# Patient Record
Sex: FEMALE | Race: WHITE | NOT HISPANIC OR LATINO | Employment: FULL TIME | URBAN - METROPOLITAN AREA
[De-identification: names, ages, dates, MRNs, and addresses within clinical notes are randomized per-mention and may not be internally consistent; named-entity substitution may affect disease eponyms.]

---

## 2017-10-02 ENCOUNTER — OFFICE VISIT (OUTPATIENT)
Dept: FAMILY MEDICINE | Facility: CLINIC | Age: 32
End: 2017-10-02
Payer: COMMERCIAL

## 2017-10-02 VITALS
TEMPERATURE: 99.1 F | HEIGHT: 65 IN | DIASTOLIC BLOOD PRESSURE: 74 MMHG | OXYGEN SATURATION: 100 % | SYSTOLIC BLOOD PRESSURE: 122 MMHG | BODY MASS INDEX: 27.63 KG/M2 | WEIGHT: 165.8 LBS | HEART RATE: 61 BPM | RESPIRATION RATE: 18 BRPM

## 2017-10-02 DIAGNOSIS — Z23 NEED FOR PROPHYLACTIC VACCINATION AND INOCULATION AGAINST INFLUENZA: ICD-10-CM

## 2017-10-02 DIAGNOSIS — Z00.00 ROUTINE GENERAL MEDICAL EXAMINATION AT A HEALTH CARE FACILITY: Primary | ICD-10-CM

## 2017-10-02 PROCEDURE — 90686 IIV4 VACC NO PRSV 0.5 ML IM: CPT | Performed by: FAMILY MEDICINE

## 2017-10-02 PROCEDURE — 99395 PREV VISIT EST AGE 18-39: CPT | Mod: 25 | Performed by: FAMILY MEDICINE

## 2017-10-02 PROCEDURE — 90471 IMMUNIZATION ADMIN: CPT | Performed by: FAMILY MEDICINE

## 2017-10-02 NOTE — PROGRESS NOTES
SUBJECTIVE:   CC: Donna Dale is an 32 year old woman who presents for preventive health visit.     Physical   Annual:     Getting at least 3 servings of Calcium per day::  Yes    Bi-annual eye exam::  NO    Dental care twice a year::  Yes    Sleep apnea or symptoms of sleep apnea::  None    Diet::  Regular (no restrictions)    Taking medications regularly::  Yes    Medication side effects::  Not applicable    Additional concerns today::  No  [facility]    CV:  No family h/o early CAD.  Malignancy: pap NIL last year.  No family h/o breast, ovarian, or colon cancer.    Bone health:no family h/o osteoporosis, gets calcium, has started to get back into running.    Immunizations: tdap is up to date, due 2019; would like a flu shot  Sexual health: Not sexually active, regular periods  Depression screen: neg      Today's PHQ-2 Score:   PHQ-2 ( 1999 Pfizer) 10/2/2017   Q1: Little interest or pleasure in doing things 0   Q2: Feeling down, depressed or hopeless 0   PHQ-2 Score 0   Q1: Little interest or pleasure in doing things Not at all   Q2: Feeling down, depressed or hopeless Not at all   PHQ-2 Score 0     Abuse: Current or Past(Physical, Sexual or Emotional)- No  Do you feel safe in your environment - Yes    Social History   Substance Use Topics     Smoking status: Never Smoker     Smokeless tobacco: Never Used     Alcohol use Yes      Comment: 1-2 per week     The patient does not drink >3 drinks per day nor >7 drinks per week.    Reviewed orders with patient.  Reviewed health maintenance and updated orders accordingly - Yes  There is no problem list on file for this patient.    History reviewed. No pertinent surgical history.    Social History   Substance Use Topics     Smoking status: Never Smoker     Smokeless tobacco: Never Used     Alcohol use Yes      Comment: 1-2 per week     Family History   Problem Relation Age of Onset     Hypertension Mother      Seizure Disorder Father      due to an MVA  "    Emphysema Maternal Grandmother 60     smoker     Coronary Artery Disease Maternal Uncle      Coronary Artery Disease Maternal Uncle          Current Outpatient Prescriptions   Medication Sig Dispense Refill     valACYclovir (VALTREX) 1000 mg tablet Take 2 tablets (2,000 mg) by mouth 2 times daily (Patient not taking: Reported on 10/2/2017) 16 tablet 3     No Known Allergies      Mammogram not appropriate for this patient based on age.    Pertinent mammograms are reviewed under the imaging tab.  History of abnormal Pap smear: NO - age 21-29 PAP every 3 years recommended    Reviewed and updated as needed this visit by clinical staffTobacco  Allergies  Meds  Med Hx  Surg Hx  Fam Hx  Soc Hx        Reviewed and updated as needed this visit by Provider            ROS:  C: NEGATIVE for fever, chills, change in weight  I: NEGATIVE for worrisome rashes, moles or lesions  E: NEGATIVE for vision changes or irritation  ENT: NEGATIVE for ear, mouth and throat problems  R: NEGATIVE for significant cough or SOB  B: NEGATIVE for masses, tenderness or discharge  CV: NEGATIVE for chest pain, palpitations or peripheral edema  GI: NEGATIVE for nausea, abdominal pain, heartburn, or change in bowel habits  : NEGATIVE for unusual urinary or vaginal symptoms. Periods are regular.  M: NEGATIVE for significant arthralgias or myalgia  N: NEGATIVE for weakness, dizziness or paresthesias  P: NEGATIVE for changes in mood or affect     OBJECTIVE:   /74 (BP Location: Right arm, Patient Position: Sitting, Cuff Size: Adult Regular)  Pulse 61  Temp 99.1  F (37.3  C) (Tympanic)  Resp 18  Ht 5' 4.5\" (1.638 m)  Wt 165 lb 12.8 oz (75.2 kg)  SpO2 100%  BMI 28.02 kg/m2  EXAM:  GENERAL: healthy, alert and no distress  EYES: Eyes grossly normal to inspection, PERRL and conjunctivae and sclerae normal  HENT: ear canals and TM's normal, nose and mouth without ulcers or lesions  NECK: no adenopathy, no asymmetry, masses, or scars and " "thyroid normal to palpation  RESP: lungs clear to auscultation - no rales, rhonchi or wheezes  BREAST: normal without masses, tenderness or nipple discharge and no palpable axillary masses or adenopathy  CV: regular rate and rhythm, normal S1 S2, no S3 or S4, no murmur, click or rub, no peripheral edema and peripheral pulses strong  ABDOMEN: soft, nontender, no hepatosplenomegaly, no masses and bowel sounds normal  MS: no gross musculoskeletal defects noted, no edema  SKIN: no suspicious lesions or rashes  NEURO: Normal strength and tone, mentation intact and speech normal  PSYCH: mentation appears normal, affect normal/bright    ASSESSMENT/PLAN:   1. Routine general medical examination at a health care facility  CV: no risk factors, working on running.  Malignancy: pap up to date  Bone health: no risk factors  Immunizations: flu shot today        COUNSELING:  Reviewed preventive health counseling, as reflected in patient instructions         reports that she has never smoked. She has never used smokeless tobacco.    Estimated body mass index is 28.02 kg/(m^2) as calculated from the following:    Height as of this encounter: 5' 4.5\" (1.638 m).    Weight as of this encounter: 165 lb 12.8 oz (75.2 kg).       Counseling Resources:  ATP IV Guidelines  Pooled Cohorts Equation Calculator  Breast Cancer Risk Calculator  FRAX Risk Assessment  ICSI Preventive Guidelines  Dietary Guidelines for Americans, 2010  USDA's MyPlate  ASA Prophylaxis  Lung CA Screening    Isadora Salas MD  [facility]  Answers for HPI/ROS submitted by the patient on 10/2/2017   PHQ-2 Score: 0    "

## 2017-10-02 NOTE — NURSING NOTE
"Chief Complaint   Patient presents with     Physical       Initial /74 (BP Location: Right arm, Patient Position: Sitting, Cuff Size: Adult Regular)  Pulse 61  Temp 99.1  F (37.3  C) (Tympanic)  Resp 18  Ht 5' 4.5\" (1.638 m)  Wt 165 lb 12.8 oz (75.2 kg)  SpO2 100%  BMI 28.02 kg/m2 Estimated body mass index is 28.02 kg/(m^2) as calculated from the following:    Height as of this encounter: 5' 4.5\" (1.638 m).    Weight as of this encounter: 165 lb 12.8 oz (75.2 kg).  Medication Reconciliation: unable or not appropriate to perform       Jesus Vaughn MA      "

## 2017-10-02 NOTE — MR AVS SNAPSHOT
After Visit Summary   10/2/2017    Donna Dale    MRN: [id number]           Patient Information     Date Of Birth          1985        Visit Information        Provider Department      10/2/2017 1:20 PM Isadora Salas MD [facility]        Today's Diagnoses     Routine general medical examination at a health care facility    -  1    Need for prophylactic vaccination and inoculation against influenza          Care Instructions      Preventive Health Recommendations  Female Ages 26 - 39  Yearly exam:   See your health care provider every year in order to    Review health changes.     Discuss preventive care.      Review your medicines if you your doctor has prescribed any.    Until age 30: Get a Pap test every three years (more often if you have had an abnormal result).    After age 30: Talk to your doctor about whether you should have a Pap test every 3 years or have a Pap test with HPV screening every 5 years.   You do not need a Pap test if your uterus was removed (hysterectomy) and you have not had cancer.  You should be tested each year for STDs (sexually transmitted diseases), if you're at risk.   Talk to your provider about how often to have your cholesterol checked.  If you are at risk for diabetes, you should have a diabetes test (fasting glucose).  Shots: Get a flu shot each year. Get a tetanus shot every 10 years.   Nutrition:     Eat at least 5 servings of fruits and vegetables each day.    Eat whole-grain bread, whole-wheat pasta and brown rice instead of white grains and rice.    Talk to your provider about Calcium and Vitamin D.     Lifestyle    Exercise at least 150 minutes a week (30 minutes a day, 5 days of the week). This will help you control your weight and prevent disease.    Limit alcohol to one drink per day.    No smoking.     Wear sunscreen to prevent skin cancer.    See your dentist every six months for an exam and cleaning.             "Follow-ups after your visit        Who to contact     If you have questions or need follow up information about today's clinic visit or your schedule please contact [facility] directly at [phone].  Normal or non-critical lab and imaging results will be communicated to you by MyChart, letter or phone within 4 business days after the clinic has received the results. If you do not hear from us within 7 days, please contact the clinic through MyChart or phone. If you have a critical or abnormal lab result, we will notify you by phone as soon as possible.  Submit refill requests through [email or web address] or call your pharmacy and they will forward the refill request to us. Please allow 3 business days for your refill to be completed.          Additional Information About Your Visit        [email or web address] Information     [email or web address] lets you send messages to your doctor, view your test results, renew your prescriptions, schedule appointments and more. To sign up, go to [email or web address] . Click on \"Log in\" on the left side of the screen, which will take you to the Welcome page. Then click on \"Sign up Now\" on the right side of the page.     You will be asked to enter the access code listed below, as well as some personal information. Please follow the directions to create your username and password.     Your access code is: [email or web address]  Expires: 2017  5:08 PM     Your access code will  in 90 days. If you need help or a new code, please call your [facility] or [phone].        Care EveryWhere ID     This is your Care EveryWhere ID. This could be used by other organizations to access your [place] medical records  [id number]        Your Vitals Were     Pulse Temperature Respirations Height Last Period Pulse Oximetry    61 99.1  F (37.3  C) (Tympanic) 18 5' 4.5\" (1.638 m) 2017 (Exact Date) 100%    BMI (Body Mass Index)                   28.02 kg/m2            Blood Pressure from Last 3 " Encounters:   10/02/17 122/74   09/19/16 116/76   09/16/15 120/70    Weight from Last 3 Encounters:   10/02/17 165 lb 12.8 oz (75.2 kg)   09/19/16 156 lb 3.2 oz (70.9 kg)   09/16/15 164 lb 3.2 oz (74.5 kg)              We Performed the Following     FLU VAC, SPLIT VIRUS IM > 3 YO (QUADRIVALENT) [53968]     Vaccine Administration, Initial [92568]        Primary Care Provider    None Specified       No primary provider on file.        Equal Access to Services     [facility]: Hadii jb Branham, breann marquez, mian posadasalloren rodriguez, dean menon . [facility] [phone].    ATENCIÓN: Si habla español, tiene a valentin disposición servicios gratuitos de asistencia lingüística. [facility] [phone].    We comply with applicable federal civil rights laws and [place] laws. We do not discriminate on the basis of race, color, national origin, age, disability, sex, sexual orientation, or gender identity.            Thank you!     Thank you for choosing [facility]  for your care. Our goal is always to provide you with excellent care. Hearing back from our patients is one way we can continue to improve our services. Please take a few minutes to complete the written survey that you may receive in the mail after your visit with us. Thank you!             Your Updated Medication List - Protect others around you: Learn how to safely use, store and throw away your medicines at www.disposemymeds.org.          This list is accurate as of: 10/2/17  5:08 PM.  Always use your most recent med list.                   Brand Name Dispense Instructions for use Diagnosis    valACYclovir 1000 mg tablet    VALTREX    16 tablet    Take 2 tablets (2,000 mg) by mouth 2 times daily    Cold sore

## 2017-10-02 NOTE — PROGRESS NOTES
Injectable Influenza Immunization Documentation    1.  Is the person to be vaccinated sick today?   No    2. Does the person to be vaccinated have an allergy to a component   of the vaccine?   No    3. Has the person to be vaccinated ever had a serious reaction   to influenza vaccine in the past?   No    4. Has the person to be vaccinated ever had Guillain-Barré syndrome?   No    Form completed by patient.    Jesus Vaughn MA

## 2020-03-17 ENCOUNTER — VIRTUAL VISIT (OUTPATIENT)
Dept: FAMILY MEDICINE | Facility: OTHER | Age: 35
End: 2020-03-17

## 2020-03-17 ENCOUNTER — NURSE TRIAGE (OUTPATIENT)
Dept: NURSING | Facility: CLINIC | Age: 35
End: 2020-03-17

## 2020-03-17 NOTE — TELEPHONE ENCOUNTER
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 Patient was directed through [facility] to a testing location for COVID19.    Patient reporting she had not heard anything back on testing availability.  Patient denies change in symptoms.   Advised to continue to isolate at home per [facility] discharge instructions until she is notified to test.    Margareth Nunez RN  [facility]        Reason for Disposition    [1] Caller requesting NON-URGENT health information AND [2] PCP's office is the best resource    Additional Information    Negative: [1] Caller is not with the adult (patient) AND [2] reporting urgent symptoms    Negative: Lab result questions    Negative: Medication questions    Negative: Caller can't be reached by phone    Negative: Caller has already spoken to PCP or another triager    Negative: RN needs further essential information from caller in order to complete triage    Negative: Requesting regular office appointment    Protocols used: INFORMATION ONLY CALL-A-

## 2020-03-17 NOTE — PROGRESS NOTES
"Date: 2020 20:11:27  Clinician: Joel Wegener  Clinician NPI: [id number]  Patient: Donna Dale  Patient : 1985  Patient Address: [address], [place] [address]  Patient Phone: [phone]  Visit Protocol: URI  Patient Summary:  Donna is a 34 year old ( : 1985 ) female who initiated a Visit for cold, sinus infection, or influenza. When asked the question \"Please sign me up to receive news, health information and promotions from [employer].\", Donna responded \"No\".    Donna states her symptoms started 1-2 days ago.   Her symptoms consist of malaise, myalgia, a sore throat, and nasal congestion. Donna also feels feverish.   Symptom details     Nasal secretions: The color of her mucus is clear.    Sore throat: Donna reports having mild throat pain (1-3 on a 10 point pain scale), does not have exudate on her tonsils, and can swallow liquids. The lymph nodes in her neck are not enlarged. A rash has not appeared on the skin since the sore throat started.     Temperature: Her current temperature is 99.8 degrees Fahrenheit.      Donna denies having ear pain, headache, rhinitis, enlarged lymph nodes, facial pain or pressure, wheezing, cough, teeth pain, and chills. She also denies having recent facial or sinus surgery in the past 60 days and taking antibiotic medication for the symptoms. She is not experiencing dyspnea.   Precipitating events  Within the past week, Donna has not been exposed to someone with strep throat. She has not recently been exposed to someone with influenza. Donna has been in close contact with the following high risk individuals: adults 65 or older.   Pertinent COVID-19 (Coronavirus) information  Donna has not traveled internationally or to the areas where COVID-19 (Coronavirus) is widespread in the last 14 days before the start of her symptoms.   Donna has not had close contact with a suspected or laboratory-confirmed COVID-19 patient within 14 days of " symptom onset.   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   Pertinent medical history  Donna does not get yeast infections when she takes antibiotics.   Donna needs a return to work/school note.   Weight: 157 lbs   Donna does not smoke or use smokeless tobacco.   She denies pregnancy and denies breastfeeding. She has menstruated in the past month.   Weight: 157 lbs    MEDICATIONS: Women's Multivitamin oral, ALLERGIES: NKDA  Clinician Response:  Dear Donna,   Based on the information you have provided, it is recommended that you go to one of our designated Coronavirus (Covid-19) testing centers to get a test done from your car.  We are offering testing from your car in [facility], [place], [facility] and [place].   To schedule a visit at [facility], [facility] or [place], please call [phone] to find out when the next available testing window is. Testing will be done in 1 hour blocks so that you can wait at home until we are available to more quickly perform your testing from the car.   To complete testing in [place] ONLY, follow the instructions below:    Go as soon as possible during the hours noted to [facility]) [address], [place] [address]. Hours: M-F 7:30am-5pm    What to expect:   When you arrive please come park in the parking lot.   Be prepared to present photo ID   Call [phone] and let them know you have arrived.    They will ask for information to get you registered for a visit and will ask for the description of your car and where you are parked.    They will add you to the queue to get your test (you will stay in your car the entire time).   You will then be met by a provider who will perform a brief assessment in your car and collect samples to test for coronavirus and possibly influenza or RSV.    Isolate yourself while traveling.  Do  Not allow any visitors within 6 feet.  Do Not go to work or school.  Do Not go to [employer],  centers, shopping, or other public places.  Do Not shake hands.  Avoid close contact with others (hugging, kissing).Protect Others:     Cover Your Mouth and Nose with a mask, disposable tissue or wash cloth to avoid spreading germs to others.  Wash your hands and face frequently with soap and water   Fever Medicines:    For fever relief, take acetaminophen or ibuprofen.  Treat fevers above 101deg F (38.3deg C) to lower fevers and make you more comfortable.   Acetaminophen (e.g., Tylenol): Take 650 mg (two 325 mg pills) by mouth every 4-6 hours as needed of regular strength Tylenol or 1,000 mg (two 500 mg pills) every 8 hours as needed of Extra Strength Tylenol.   Ibuprofen (e.g., Motrin, Advil): Take 400 mg (two 200 mg pills) by mouth every 6 hours as needed.   Acetaminophen is thought to be safer than ibuprofen or naproxen for people over 65 years old. Acetaminophen is in many OTC and prescription medicines. It might be in more than one medicine that you are taking. You need to be careful and not take an overdose. Before taking any medicine, read all the instructions on the package.  Caution -NSAIDs (e.g., ibuprofen, naproxen): Do not take nonsteroidal anti-inflammatory drugs (NSAIDs) if you have stomach problems, kidney disease, heart failure, or other contraindications to using this type of medicine. Do not take NSAID medicines for over 7 days without consulting your PCP. Do not take NSAID medicines if you are pregnant. Do not take NSAID medicines if you are also taking blood thinners.    Call or submit a new visit if: Breathing difficulty develops or you become worse.  Thank you for limiting contact with others, wearing a simple mask to cover your cough, practice good hand hygiene habits and accessing our virtual services where possible to limit the spread of this virus.  For more information about COVID19 and  options for caring for yourself at home, please visit the CDC website at https://www.cdc.gov/coronavirus/2019-ncov/about/steps-when-sick.html  For more options for care at [facility], please visit our website at [email or web address]    Diagnosis: Cough  Diagnosis ICD: R05

## 2020-05-04 ENCOUNTER — RESULTS ONLY (OUTPATIENT)
Dept: LAB | Age: 35
End: 2020-05-04

## 2020-05-04 ENCOUNTER — APPOINTMENT (OUTPATIENT)
Dept: LAB | Facility: CLINIC | Age: 35
End: 2020-05-04
Payer: COMMERCIAL

## 2020-05-04 ENCOUNTER — MYC REFILL (OUTPATIENT)
Dept: FAMILY MEDICINE | Facility: CLINIC | Age: 35
End: 2020-05-04

## 2020-05-04 DIAGNOSIS — B00.1 COLD SORE: ICD-10-CM

## 2020-05-06 LAB
COVID-19 SPIKE RBD ABY TITER: NORMAL
COVID-19 SPIKE RBD ABY: NEGATIVE

## 2020-05-07 RX ORDER — VALACYCLOVIR HYDROCHLORIDE 1 G/1
2000 TABLET, FILM COATED ORAL 2 TIMES DAILY
Qty: 16 TABLET | Refills: 3 | OUTPATIENT
Start: 2020-05-07

## 2020-05-07 NOTE — TELEPHONE ENCOUNTER
Routing refill request to provider for review/approval because:  Labs not current:  Creatinine - no lab in chart

## 2020-05-07 NOTE — TELEPHONE ENCOUNTER
Looks like patient no showed her last 2 appointments and hasn't been seen since 2018.  Needs appointment for refills.  Dr. Juana Collier MD / [facility]

## 2020-11-12 ENCOUNTER — E-VISIT (OUTPATIENT)
Dept: URGENT CARE | Facility: URGENT CARE | Age: 35
End: 2020-11-12
Payer: COMMERCIAL

## 2020-11-12 DIAGNOSIS — Z20.822 SUSPECTED COVID-19 VIRUS INFECTION: Primary | ICD-10-CM

## 2020-11-12 PROCEDURE — 99207 PR NO BILLABLE SERVICE THIS VISIT: CPT | Performed by: FAMILY MEDICINE

## 2020-11-12 NOTE — PATIENT INSTRUCTIONS
"  Dear Donna Dale,    Your symptoms show that you may have coronavirus (COVID-19). This illness can cause fever, cough and trouble breathing. Many people get a mild case and get better on their own. Some people can get very sick.    Will I be tested for COVID-19?  We would like to test you for this virus. I have placed an order for this test and please call [phone] to schedule testing. [employer] [facility] employees please call [phone]. [employer] employees call [phone].     When it's time for your COVID test:  Stay at least 6 feet away from others. (If someone will drive you to your test, stay in the backseat, as far away from the  as you can.)  Cover your mouth and nose with a mask, tissue or washcloth.  Go straight to the testing site. Don't make any stops on the way there or back.    Starting now:     Do not go to work.   o If you receive a negative COVID-19 test result and were NOT exposed to someone with a known positive COVID-19 test, you can return to work once you're free of fever for 24 hours without fever-reducing medication and your symptoms are improving or resolved.  o If you receive a positive COVID-19 test result, you must be cleared by Employee Occupational Health and Safety to return to work.   o If you were exposed to someone who has tested positive for COVID-19, you can return to work 14 days after your last contact with the positive individual, provided you do not have symptoms at all during that time. In some cases, your manager may ask you to come back sooner than 14 days.     During this time, don't leave the house except for testing or medical care.  o Stay in your own room, even for meals. Use your own bathroom if you can.  o Stay away from others in your home. No hugging, kissing or shaking hands. No visitors.  o Don't go to work, school or anywhere else.    Clean \"high touch\" surfaces often (doorknobs, counters, handles, etc.). Use a household cleaning spray or " wipes. You'll find a full list of  on the EPA website: www.epa.gov/pesticide-registration/list-n-disinfectants-use-against-sars-cov-2.    Cover your mouth and nose with a mask, tissue or washcloth to avoid spreading germs.    Wash your hands and face often. Use soap and water.    People in these groups are at risk for severe illness due to COVID-19:  o People 65 years and older  o People who live in a nursing home or long-term care facility  o People with chronic disease (lung, heart, cancer, diabetes, kidney, liver, immunologic)  o People who have a weakened immune system, including those who:  - Are in cancer treatment  - Take medicine that weakens the immune system, such as corticosteroids  - Had a bone marrow or organ transplant  - Have an immune deficiency  - Have poorly controlled HIV or AIDS  - Are obese (body mass index of 40 or higher)  - Smoke regularly      Caregivers should wear gloves while washing dishes, handling laundry and cleaning bedrooms and bathrooms.    Use caution when washing and drying laundry: Don't shake dirty laundry, and use the warmest water setting that you can.    For more tips, go to www.cdc.gov/coronavirus/2019-ncov/downloads/10Things.pdf.    Sign up for [employer]. We know it's scary to hear that you might have COVID-19. We want to track your symptoms to make sure you're okay over the next 2 weeks. Please look for an email from [employer]--this is a free, online program that we'll use to keep in touch. To sign up, follow the link in the email you will receive. Learn more at [email or web address]    How can I take care of myself?    Get lots of rest. Drink extra fluids (unless a doctor has told you not to)    Take Tylenol (acetaminophen) for fever or pain. If you have liver or kidney problems, ask your family doctor if it's okay to take Tylenol.  Adults can take either:    650 mg (two 325 mg pills) every 4 to 6 hours, or     1,000 mg (two 500 mg pills) every  8 hours as needed.    Note: Don't take more than 3,000 mg in one day. Acetaminophen is found in many medicines (both prescribed and over-the-counter medicines). Read all labels to be sure you don't take too much.  For children, check the Tylenol bottle for the right dose. The dose is based on the child's age or weight.    If you have other health problems (like cancer, heart failure, an organ transplant or severe kidney disease): Call your specialty clinic if you don't feel better in the next 2 days.    Know when to call 911. Emergency warning signs include:  Trouble breathing or shortness of breath  Pain or pressure in the chest that doesn't go away  Feeling confused like you haven't felt before, or not being able to wake up  Bluish-colored lips or face    Where can I get more information?     [facility] - About COVID-19: [email or web address]  CDC - What to Do If You're Sick: www.cdc.gov/coronavirus/2019-ncov/about/steps-when-sick.html

## 2020-12-27 ENCOUNTER — HEALTH MAINTENANCE LETTER (OUTPATIENT)
Age: 35
End: 2020-12-27

## 2021-01-31 ENCOUNTER — VIRTUAL VISIT (OUTPATIENT)
Dept: FAMILY MEDICINE | Facility: OTHER | Age: 36
End: 2021-01-31

## 2021-01-31 ENCOUNTER — OFFICE VISIT (OUTPATIENT)
Dept: URGENT CARE | Facility: URGENT CARE | Age: 36
End: 2021-01-31
Payer: COMMERCIAL

## 2021-01-31 VITALS
HEIGHT: 65 IN | OXYGEN SATURATION: 100 % | SYSTOLIC BLOOD PRESSURE: 118 MMHG | HEART RATE: 84 BPM | BODY MASS INDEX: 26.66 KG/M2 | DIASTOLIC BLOOD PRESSURE: 70 MMHG | TEMPERATURE: 98.7 F | WEIGHT: 160 LBS

## 2021-01-31 DIAGNOSIS — R07.0 THROAT PAIN: Primary | ICD-10-CM

## 2021-01-31 LAB
DEPRECATED S PYO AG THROAT QL EIA: NEGATIVE
SPECIMEN SOURCE: NORMAL

## 2021-01-31 PROCEDURE — 87651 STREP A DNA AMP PROBE: CPT | Performed by: PREVENTIVE MEDICINE

## 2021-01-31 PROCEDURE — U0003 INFECTIOUS AGENT DETECTION BY NUCLEIC ACID (DNA OR RNA); SEVERE ACUTE RESPIRATORY SYNDROME CORONAVIRUS 2 (SARS-COV-2) (CORONAVIRUS DISEASE [COVID-19]), AMPLIFIED PROBE TECHNIQUE, MAKING USE OF HIGH THROUGHPUT TECHNOLOGIES AS DESCRIBED BY CMS-2020-01-R: HCPCS | Performed by: PREVENTIVE MEDICINE

## 2021-01-31 PROCEDURE — U0005 INFEC AGEN DETEC AMPLI PROBE: HCPCS | Performed by: PREVENTIVE MEDICINE

## 2021-01-31 PROCEDURE — 99213 OFFICE O/P EST LOW 20 MIN: CPT | Performed by: PREVENTIVE MEDICINE

## 2021-01-31 RX ORDER — IBUPROFEN 200 MG
200 TABLET ORAL EVERY 4 HOURS PRN
COMMUNITY
End: 2023-10-24

## 2021-01-31 RX ORDER — ACETAMINOPHEN 325 MG/1
325-650 TABLET ORAL EVERY 6 HOURS PRN
COMMUNITY
End: 2024-01-19

## 2021-01-31 ASSESSMENT — MIFFLIN-ST. JEOR: SCORE: 1431.64

## 2021-01-31 NOTE — PROGRESS NOTES
"Date: 2021 07:44:20  Clinician: Anat Correia  Clinician NPI: [id number]  Patient: Donna Dale  Patient : 1985  Patient Address: [address], [place] [address]  Patient Phone: [phone]  Visit Protocol: URI  Patient Summary:  Donna is a 35 year old ( : 1985 ) female who initiated a [facility] Visit for cold, sinus infection, or influenza. When asked the question \"Please sign me up to receive news, health information and promotions. \", Donna responded \"No\".    Donna states her symptoms started 1-2 days ago.   Her symptoms consist of a headache and a sore throat.   Symptom details     Sore throat: Donna reports having moderate throat pain (4-6 on a 10 point pain scale), does not have exudate on her tonsils, and can swallow liquids. The lymph nodes in her neck are not enlarged. A rash has not appeared on the skin since the sore throat started.     Headache: She states the headache is mild (1-3 on a 10 point pain scale).      Donna denies having ear pain, chills, vomiting, myalgias, rhinitis, malaise, fever, enlarged lymph nodes, cough, nasal congestion, nausea, teeth pain, ageusia, diarrhea, wheezing, facial pain or pressure, and anosmia. She also denies having recent facial or sinus surgery in the past 60 days and taking antibiotic medication in the past month. She is not experiencing dyspnea.   Precipitating events  Within the past week, Donna has not been exposed to someone with strep throat.   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 In the past 14 days, Donna has worked or volunteered at a hospital or a clinic. 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   In the past 14 days, she has not lived in a congregate living setting.   Donna has not had a close " contact with a laboratory-confirmed COVID-19 patient within 14 days of symptom onset.    Donna has been tested for COVID-19.      Date(s) of her COVID-19 test as reported by the patient (free text): 12/16/20       Result of COVID-19 test as reported by the patient (free text): Negative       Type of test as reported by the patient (free text): Alvin Thompson has received the Pfizer-BioNTech COVID-19 vaccine. She got both doses.      Date of the first dose as reported by the patient (free text): 12/22/20       Date of the second dose as reported by the patient (free text): 1/12/21        Pertinent medical history  She has not been told by her provider to avoid NSAIDs.   Donna does not get yeast infections when she takes antibiotics.   Donna does not have diabetes. She denies having immunosuppressive conditions (e.g., chemotherapy, HIV, organ transplant, long-term use of steroids or other immunosuppressive medications, splenectomy). She denies having congestive heart failure and severe COPD. She does not have asthma.   Donna does not need a return to work/school note.   Donna does not smoke or use smokeless tobacco.   She denies pregnancy and denies breastfeeding. She has menstruated in the past month.   Weight: 160 lbs    MEDICATIONS: Women's Multivitamin oral, ALLERGIES: NKDA  Clinician Response:  Dear Donna,  I am sorry you are not feeling well. Your health is our priority. Based on the information provided, a throat swab is needed to determine whether or not you have strep throat. Please be seen by a provider in a clinic or an urgent care in the next 1 -2 days for evaluation of strep throat.  You will not be charged for this [facility] Visit. Thank you for trusting us with your care.  For the latest updates on COVID-19 (Coronavirus), please visit the Centers for Disease Control and Prevention (CDC).   Diagnosis: Refer for additional evaluation - strep pharyngitis  Diagnosis ICD: R69

## 2021-01-31 NOTE — PATIENT INSTRUCTIONS
Pharyngitis  COVID pending  Isolate for 8 days    PLAN:   See orders in epic.   Symptomatic treat with gargles, lozenges, and OTC analgesic as needed. Follow-up with primary clinic if not improving.

## 2021-01-31 NOTE — PROGRESS NOTES
"SUBJECTIVE:  Donna Dale is a 35 year old female with a chief complaint of sore throat.  Onset of symptoms was 2 day(s) ago.    Course of illness: sudden onset.  Severity moderate  Current and Associated symptoms: headache  Treatment measures tried include None tried.  Predisposing factors include None.    No past medical history on file.  Current Outpatient Medications   Medication Sig Dispense Refill     acetaminophen (TYLENOL) 325 MG tablet Take 325-650 mg by mouth every 6 hours as needed for mild pain       ibuprofen (ADVIL/MOTRIN) 200 MG tablet Take 200 mg by mouth every 4 hours as needed for mild pain       valACYclovir (VALTREX) 1000 mg tablet Take 2 tablets (2,000 mg) by mouth 2 times daily (Patient not taking: Reported on 10/2/2017) 16 tablet 3     Social History     Tobacco Use     Smoking status: Never Smoker     Smokeless tobacco: Never Used   Substance Use Topics     Alcohol use: Yes     Comment: 1-2 per week       ROS:  Review of systems negative except as stated above.    OBJECTIVE:   /70   Pulse 84   Temp 98.7  F (37.1  C) (Oral)   Ht 1.651 m (5' 5\")   Wt 72.6 kg (160 lb)   SpO2 100%   BMI 26.63 kg/m    GENERAL APPEARANCE: healthy, alert and no distress  EYES: EOMI,  PERRL, conjunctiva clear  HENT: ear canals and TM's normal.  Nose normal.  Pharynx erythematous with some exudate noted.  NECK: supple, non-tender to palpation, no adenopathy noted  RESP: lungs clear to auscultation - no rales, rhonchi or wheezes  CV: regular rates and rhythm, normal S1 S2, no murmur noted  ABDOMEN:  soft, nontender, no HSM or masses and bowel sounds normal  SKIN: no suspicious lesions or rashes    Rapid Strep test is negative; await throat culture results.    ASSESSMENT:  Pharyngitis  COVID pending  Isolate for 8 days    PLAN:   See orders in epic.   Symptomatic treat with gargles, lozenges, and OTC analgesic as needed. Follow-up with primary clinic if not improving.      "

## 2021-02-01 LAB
LABORATORY COMMENT REPORT: NORMAL
SARS-COV-2 RNA RESP QL NAA+PROBE: NEGATIVE
SARS-COV-2 RNA RESP QL NAA+PROBE: NORMAL
SPECIMEN SOURCE: NORMAL
STREP GROUP A PCR: NOT DETECTED

## 2021-03-15 ENCOUNTER — OFFICE VISIT (OUTPATIENT)
Dept: FAMILY MEDICINE | Facility: CLINIC | Age: 36
End: 2021-03-15
Payer: COMMERCIAL

## 2021-03-15 VITALS
OXYGEN SATURATION: 98 % | TEMPERATURE: 98.6 F | DIASTOLIC BLOOD PRESSURE: 71 MMHG | HEIGHT: 65 IN | HEART RATE: 94 BPM | BODY MASS INDEX: 26.29 KG/M2 | SYSTOLIC BLOOD PRESSURE: 122 MMHG | WEIGHT: 157.8 LBS

## 2021-03-15 DIAGNOSIS — Z00.00 ROUTINE GENERAL MEDICAL EXAMINATION AT A HEALTH CARE FACILITY: Primary | ICD-10-CM

## 2021-03-15 PROCEDURE — 90715 TDAP VACCINE 7 YRS/> IM: CPT | Performed by: FAMILY MEDICINE

## 2021-03-15 PROCEDURE — 90471 IMMUNIZATION ADMIN: CPT | Performed by: FAMILY MEDICINE

## 2021-03-15 PROCEDURE — 99395 PREV VISIT EST AGE 18-39: CPT | Mod: 25 | Performed by: FAMILY MEDICINE

## 2021-03-15 PROCEDURE — 87389 HIV-1 AG W/HIV-1&-2 AB AG IA: CPT | Performed by: FAMILY MEDICINE

## 2021-03-15 PROCEDURE — 36415 COLL VENOUS BLD VENIPUNCTURE: CPT | Performed by: FAMILY MEDICINE

## 2021-03-15 PROCEDURE — 86803 HEPATITIS C AB TEST: CPT | Performed by: FAMILY MEDICINE

## 2021-03-15 ASSESSMENT — ENCOUNTER SYMPTOMS
WEAKNESS: 0
ARTHRALGIAS: 0
HEMATURIA: 0
SHORTNESS OF BREATH: 0
JOINT SWELLING: 0
FREQUENCY: 0
COUGH: 0
DYSURIA: 0
PALPITATIONS: 0
EYE PAIN: 0
SORE THROAT: 0
DIZZINESS: 0
FEVER: 0
MYALGIAS: 0
NERVOUS/ANXIOUS: 0
HEADACHES: 0
HEMATOCHEZIA: 0
HEARTBURN: 0
BREAST MASS: 0
NAUSEA: 0
ABDOMINAL PAIN: 0
PARESTHESIAS: 0
CONSTIPATION: 0
DIARRHEA: 0
CHILLS: 0

## 2021-03-15 ASSESSMENT — MIFFLIN-ST. JEOR: SCORE: 1426.42

## 2021-03-15 NOTE — PROGRESS NOTES
Answers for HPI/ROS submitted by the patient on 3/15/2021   Annual Exam:  Frequency of exercise:: 2-3 days/week  Getting at least 3 servings of Calcium per day:: Yes  Diet:: Regular (no restrictions)  Taking medications regularly:: Yes  Medication side effects:: None  Bi-annual eye exam:: NO  Dental care twice a year:: Yes  Sleep apnea or symptoms of sleep apnea:: None  abdominal pain: No  Blood in stool: No  Blood in urine: No  chest pain: No  chills: No  congestion: No  constipation: No  cough: No  diarrhea: No  dizziness: No  ear pain: No  eye pain: No  nervous/anxious: No  fever: No  frequency: No  genital sores: No  headaches: No  hearing loss: No  heartburn: No  arthralgias: No  joint swelling: No  peripheral edema: No  mood changes: No  myalgias: No  nausea: No  dysuria: No  palpitations: No  Skin sensation changes: No  sore throat: No  urgency: No  rash: No  shortness of breath: No  visual disturbance: No  weakness: No  pelvic pain: No  vaginal bleeding: No  vaginal discharge: No  tenderness: No  breast mass: No  breast discharge: No  Additional concerns today:: No  Duration of exercise:: 15-30 minutes     SUBJECTIVE:   CC: Donna Dale is an 35 year old woman who presents for preventive health visit.       Patient has been advised of split billing requirements and indicates understanding: Yes  Healthy Habits:    Do you get at least three servings of calcium containing foods daily (dairy, green leafy vegetables, etc.)? yes    Amount of exercise or daily activities, outside of work: 2-3 day(s) per week    Problems taking medications regularly No    Medication side effects: No    Have you had an eye exam in the past two years? no    Do you see a dentist twice per year? yes    Do you have sleep apnea, excessive snoring or daytime drowsiness?no       None.    Today's PHQ-2 Score:   PHQ-2 ( 1999 Pfizer) 3/15/2021 10/2/2017   Q1: Little interest or pleasure in doing things 0 0   Q2: Feeling down, depressed or  hopeless 0 0   PHQ-2 Score 0 0   Q1: Little interest or pleasure in doing things Not at all Not at all   Q2: Feeling down, depressed or hopeless Not at all Not at all   PHQ-2 Score 0 0       Abuse: Current or Past(Physical, Sexual or Emotional)- No  Do you feel safe in your environment? Yes         Social History     Tobacco Use     Smoking status: Never Smoker     Smokeless tobacco: Never Used   Substance Use Topics     Alcohol use: Yes     Comment: 1-2 per week     If you drink alcohol do you typically have >3 drinks per day or >7 drinks per week? No                     Reviewed orders with patient.  Reviewed health maintenance and updated orders accordingly - Yes       Breast CA Risk Screening:  Breast CA Risk Assessment (FHS-7) 3/15/2021   Do you have a family history of breast, colon, or ovarian cancer? No / Unknown           Pertinent mammograms are reviewed under the imaging tab.    Pertinent mammograms are reviewed under the imaging tab.  History of abnormal Pap smear:   Last 3 Pap and HPV Results:   PAP / HPV 9/19/2016   PAP NIL     PAP / HPV 9/19/2016   PAP NIL     Reviewed and updated as needed this visit by clinical staff  Tobacco  Allergies  Meds   Med Hx    Soc Hx        Reviewed and updated as needed this visit by Provider                History reviewed. No pertinent past medical history.   No past surgical history on file.  OB History   No obstetric history on file.       ROS:  CONSTITUTIONAL: NEGATIVE for fever, chills, change in weight  INTEGUMENTARU/SKIN: NEGATIVE for worrisome rashes, moles or lesions  EYES: NEGATIVE for vision changes or irritation  ENT: NEGATIVE for ear, mouth and throat problems  RESP: NEGATIVE for significant cough or SOB  BREAST: NEGATIVE for masses, tenderness or discharge  CV: NEGATIVE for chest pain, palpitations or peripheral edema  GI: NEGATIVE for nausea, abdominal pain, heartburn, or change in bowel habits  : NEGATIVE for unusual urinary or vaginal symptoms.  "Periods are regular.  MUSCULOSKELETAL: NEGATIVE for significant arthralgias or myalgia  NEURO: NEGATIVE for weakness, dizziness or paresthesias  PSYCHIATRIC: NEGATIVE for changes in mood or affect    OBJECTIVE:   /71 (BP Location: Left arm, Patient Position: Sitting, Cuff Size: Adult Regular)   Pulse 94   Temp 98.6  F (37  C) (Tympanic)   Ht 1.659 m (5' 5.3\")   Wt 71.6 kg (157 lb 12.8 oz)   SpO2 98%   BMI 26.02 kg/m    EXAM:  GENERAL: healthy, alert and no distress  EYES: Eyes grossly normal to inspection, PERRL and conjunctivae and sclerae normal  HENT: ear canals and TM's normal, nose and mouth without ulcers or lesions  NECK: no adenopathy, no asymmetry, masses, or scars and thyroid normal to palpation  RESP: lungs clear to auscultation - no rales, rhonchi or wheezes  BREAST: normal without masses, tenderness or nipple discharge and no palpable axillary masses or adenopathy  CV: regular rate and rhythm, normal S1 S2, no S3 or S4, no murmur, click or rub, no peripheral edema and peripheral pulses strong  ABDOMEN: soft, nontender, no hepatosplenomegaly, no masses and bowel sounds normal  MS: no gross musculoskeletal defects noted, no edema  SKIN: no suspicious lesions or rashes  NEURO: Normal strength and tone, mentation intact and speech normal  PSYCH: mentation appears normal, affect normal/bright    Diagnostic Test Results:  Labs reviewed in Epic    ASSESSMENT/PLAN:     1. Routine general medical examination at a health care facility  Healthy  No family history of cancer  Pap test due next fall  tdap is due today  I signed her form for her upcoming clinicals to indicate she does not need any accommodations or have any restrictions       COUNSELING:   Reviewed preventive health counseling, as reflected in patient instructions    Estimated body mass index is 26.02 kg/m  as calculated from the following:    Height as of this encounter: 1.659 m (5' 5.3\").    Weight as of this encounter: 71.6 kg (157 lb " 12.8 oz).         She reports that she has never smoked. She has never used smokeless tobacco.      Counseling Resources:  ATP IV Guidelines  Pooled Cohorts Equation Calculator  Breast Cancer Risk Calculator  BRCA-Related Cancer Risk Assessment: FHS-7 Tool  FRAX Risk Assessment  ICSI Preventive Guidelines  Dietary Guidelines for Americans, 2010  USDA's MyPlate  ASA Prophylaxis  Lung CA Screening    Isadora Olmedo MD  [facility]

## 2021-03-15 NOTE — NURSING NOTE
Prior to immunization administration, verified patients identity using patient s name and date of birth. Please see Immunization Activity for additional information.     Screening Questionnaire for Adult Immunization    Are you sick today?   No   Do you have allergies to medications, food, a vaccine component or latex?   No   Have you ever had a serious reaction after receiving a vaccination?   No   Do you have a long-term health problem with heart, lung, kidney, or metabolic disease (e.g., diabetes), asthma, a blood disorder, no spleen, complement component deficiency, a cochlear implant, or a spinal fluid leak?  Are you on long-term aspirin therapy?   No   Do you have cancer, leukemia, HIV/AIDS, or any other immune system problem?   No   Do you have a parent, brother, or sister with an immune system problem?   No   In the past 3 months, have you taken medications that affect  your immune system, such as prednisone, other steroids, or anticancer drugs; drugs for the treatment of rheumatoid arthritis, Crohn s disease, or psoriasis; or have you had radiation treatments?   No   Have you had a seizure, or a brain or other nervous system problem?   No   During the past year, have you received a transfusion of blood or blood    products, or been given immune (gamma) globulin or antiviral drug?   No   For women: Are you pregnant or is there a chance you could become       pregnant during the next month?   No   Have you received any vaccinations in the past 4 weeks?   No     Immunization questionnaire answers were all negative.        Per orders of . Dr. Olmedo injection of tdap given by Jenn Vaughn MA. Patient instructed to remain in clinic for 15 minutes afterwards, and to report any adverse reaction to me immediately.       Screening performed by Jenn Vaughn MA on 3/15/2021 at 4:06 PM.

## 2021-03-16 LAB
HCV AB SERPL QL IA: NONREACTIVE
HIV 1+2 AB+HIV1 P24 AG SERPL QL IA: NONREACTIVE

## 2021-03-16 NOTE — RESULT ENCOUNTER NOTE
Excellent! Please call or sent a [employer] message if you have any questions. Isadora Olmedo M.D.

## 2021-10-04 ENCOUNTER — HEALTH MAINTENANCE LETTER (OUTPATIENT)
Age: 36
End: 2021-10-04

## 2022-01-13 ENCOUNTER — LAB REQUISITION (OUTPATIENT)
Dept: LAB | Facility: CLINIC | Age: 37
End: 2022-01-13

## 2022-01-13 PROCEDURE — U0003 INFECTIOUS AGENT DETECTION BY NUCLEIC ACID (DNA OR RNA); SEVERE ACUTE RESPIRATORY SYNDROME CORONAVIRUS 2 (SARS-COV-2) (CORONAVIRUS DISEASE [COVID-19]), AMPLIFIED PROBE TECHNIQUE, MAKING USE OF HIGH THROUGHPUT TECHNOLOGIES AS DESCRIBED BY CMS-2020-01-R: HCPCS | Performed by: INTERNAL MEDICINE

## 2022-01-14 LAB — SARS-COV-2 RNA RESP QL NAA+PROBE: NEGATIVE

## 2022-05-15 ENCOUNTER — HEALTH MAINTENANCE LETTER (OUTPATIENT)
Age: 37
End: 2022-05-15

## 2022-08-08 ENCOUNTER — MYC MEDICAL ADVICE (OUTPATIENT)
Dept: FAMILY MEDICINE | Facility: CLINIC | Age: 37
End: 2022-08-08

## 2022-08-08 DIAGNOSIS — D22.9 MULTIPLE PIGMENTED NEVI: Primary | ICD-10-CM

## 2022-08-10 NOTE — TELEPHONE ENCOUNTER
Wants derm referral for skin check. Dad with hx skin CA  She has moles  Referral pended    April Licona, RN, BSN  [facility]

## 2022-08-11 NOTE — TELEPHONE ENCOUNTER
[employer] message sent to patient.  HOMER Chacon RN  [facility]

## 2022-09-05 ASSESSMENT — ENCOUNTER SYMPTOMS
DIARRHEA: 0
HEMATOCHEZIA: 0
CONSTIPATION: 0
PALPITATIONS: 0
NERVOUS/ANXIOUS: 0
MYALGIAS: 0
DYSURIA: 0
EYE PAIN: 0
SHORTNESS OF BREATH: 0
ABDOMINAL PAIN: 0
CHILLS: 0
HEMATURIA: 0
BREAST MASS: 0
JOINT SWELLING: 0
NAUSEA: 0
HEARTBURN: 0
HEADACHES: 0
SORE THROAT: 0
FEVER: 0
FREQUENCY: 0
PARESTHESIAS: 0
DIZZINESS: 0
ARTHRALGIAS: 0
COUGH: 0
WEAKNESS: 0

## 2022-09-11 ENCOUNTER — HEALTH MAINTENANCE LETTER (OUTPATIENT)
Age: 37
End: 2022-09-11

## 2022-09-12 ENCOUNTER — OFFICE VISIT (OUTPATIENT)
Dept: FAMILY MEDICINE | Facility: CLINIC | Age: 37
End: 2022-09-12
Payer: COMMERCIAL

## 2022-09-12 VITALS
WEIGHT: 171 LBS | TEMPERATURE: 98.4 F | HEIGHT: 65 IN | DIASTOLIC BLOOD PRESSURE: 86 MMHG | OXYGEN SATURATION: 97 % | HEART RATE: 66 BPM | RESPIRATION RATE: 18 BRPM | BODY MASS INDEX: 28.49 KG/M2 | SYSTOLIC BLOOD PRESSURE: 130 MMHG

## 2022-09-12 DIAGNOSIS — Z12.4 CERVICAL CANCER SCREENING: ICD-10-CM

## 2022-09-12 DIAGNOSIS — B00.1 COLD SORE: ICD-10-CM

## 2022-09-12 DIAGNOSIS — Z23 NEED FOR PROPHYLACTIC VACCINATION AND INOCULATION AGAINST INFLUENZA: ICD-10-CM

## 2022-09-12 DIAGNOSIS — Z00.00 ROUTINE GENERAL MEDICAL EXAMINATION AT A HEALTH CARE FACILITY: Primary | ICD-10-CM

## 2022-09-12 PROCEDURE — 87624 HPV HI-RISK TYP POOLED RSLT: CPT | Performed by: FAMILY MEDICINE

## 2022-09-12 PROCEDURE — G0145 SCR C/V CYTO,THINLAYER,RESCR: HCPCS | Performed by: FAMILY MEDICINE

## 2022-09-12 PROCEDURE — 90471 IMMUNIZATION ADMIN: CPT | Performed by: FAMILY MEDICINE

## 2022-09-12 PROCEDURE — 99395 PREV VISIT EST AGE 18-39: CPT | Mod: 25 | Performed by: FAMILY MEDICINE

## 2022-09-12 PROCEDURE — 90686 IIV4 VACC NO PRSV 0.5 ML IM: CPT | Performed by: FAMILY MEDICINE

## 2022-09-12 RX ORDER — VALACYCLOVIR HYDROCHLORIDE 1 G/1
2000 TABLET, FILM COATED ORAL 2 TIMES DAILY
Qty: 4 TABLET | Refills: 11 | Status: SHIPPED | OUTPATIENT
Start: 2022-09-12 | End: 2024-01-19

## 2022-09-12 ASSESSMENT — ENCOUNTER SYMPTOMS
COUGH: 0
HEMATOCHEZIA: 0
DIZZINESS: 0
DIARRHEA: 0
FEVER: 0
PALPITATIONS: 0
ABDOMINAL PAIN: 0
CHILLS: 0
PARESTHESIAS: 0
WEAKNESS: 0
ARTHRALGIAS: 0
HEADACHES: 0
EYE PAIN: 0
SHORTNESS OF BREATH: 0
NERVOUS/ANXIOUS: 0
CONSTIPATION: 0
MYALGIAS: 0
HEARTBURN: 0
BREAST MASS: 0
NAUSEA: 0
DYSURIA: 0
SORE THROAT: 0
HEMATURIA: 0
FREQUENCY: 0
JOINT SWELLING: 0

## 2022-09-12 NOTE — PROGRESS NOTES
SUBJECTIVE:   CC: Donna Dale is an 37 year old woman who presents for preventive health visit.     Patient has been advised of split billing requirements and indicates understanding: Yes    Healthy Habits:     Getting at least 3 servings of Calcium per day:  Yes    Bi-annual eye exam:  NO    Dental care twice a year:  Yes    Sleep apnea or symptoms of sleep apnea:  None    Diet:  Regular (no restrictions)    Frequency of exercise:  2-3 days/week    Duration of exercise:  15-30 minutes    Taking medications regularly:  Yes    Medication side effects:  Not applicable    PHQ-2 Total Score: 0    Additional concerns today:  No      Needs refill of valtrex for episodic treatment of cold sores. Doing well. She is recently engaged. Has been with her fiance for about a year and a half. Getting  next June. Has never been sexually active. Planning on waiting until next June. No concern for STI.     Today's PHQ-2 Score:   PHQ-2 ( 1999 Pfizer) 9/5/2022   Q1: Little interest or pleasure in doing things 0   Q2: Feeling down, depressed or hopeless 0   PHQ-2 Score 0   PHQ-2 Total Score (12-17 Years)- Positive if 3 or more points; Administer PHQ-A if positive -   Q1: Little interest or pleasure in doing things Not at all   Q2: Feeling down, depressed or hopeless Not at all   PHQ-2 Score 0       Abuse: Current or Past (Physical, Sexual or Emotional) - No  Do you feel safe in your environment? Yes    Have you ever done Advance Care Planning? (For example, a Health Directive, POLST, or a discussion with a medical provider or your loved ones about your wishes): No, advance care planning information given to patient to review.  Advanced care planning was discussed at today's visit.    Social History     Tobacco Use     Smoking status: Never Smoker     Smokeless tobacco: Never Used   Substance Use Topics     Alcohol use: Yes     Comment: 1-2 per week         Alcohol Use 9/5/2022   Prescreen: >3 drinks/day or >7  drinks/week? No   Prescreen: >3 drinks/day or >7 drinks/week? -       Reviewed orders with patient.  Reviewed health maintenance and updated orders accordingly - Yes       Breast Cancer Screening:    Breast CA Risk Assessment (FHS-7) 3/15/2021   Do you have a family history of breast, colon, or ovarian cancer? No / Unknown          History of abnormal Pap smear:   NO - age 30-65 PAP every 5 years with negative HPV co-testing recommended  Last 3 Pap and HPV Results:   PAP / HPV 9/19/2016   PAP (Historical) NIL     PAP / HPV 9/19/2016   PAP (Historical) NIL     Reviewed and updated as needed this visit by clinical staff   Tobacco  Allergies  Meds   Med Hx  Surg Hx  Fam Hx  Soc Hx          Reviewed and updated as needed this visit by Provider                   History reviewed. No pertinent past medical history.   History reviewed. No pertinent surgical history.  OB History   No obstetric history on file.       Review of Systems   Constitutional: Negative for chills and fever.   HENT: Negative for congestion, ear pain, hearing loss and sore throat.    Eyes: Negative for pain and visual disturbance.   Respiratory: Negative for cough and shortness of breath.    Cardiovascular: Negative for chest pain, palpitations and peripheral edema.   Gastrointestinal: Negative for abdominal pain, constipation, diarrhea, heartburn, hematochezia and nausea.   Breasts:  Negative for tenderness, breast mass and discharge.   Genitourinary: Negative for dysuria, frequency, genital sores, hematuria, pelvic pain, urgency, vaginal bleeding and vaginal discharge.   Musculoskeletal: Negative for arthralgias, joint swelling and myalgias.   Skin: Negative for rash.   Neurological: Negative for dizziness, weakness, headaches and paresthesias.   Psychiatric/Behavioral: Negative for mood changes. The patient is not nervous/anxious.            OBJECTIVE:   /86   Pulse 66   Temp 98.4  F (36.9  C) (Temporal)   Resp 18   Ht 1.645 m (5'  "4.75\")   Wt 77.6 kg (171 lb)   LMP 09/04/2022 (Exact Date)   SpO2 97%   BMI 28.68 kg/m     Wt Readings from Last 5 Encounters:   09/12/22 77.6 kg (171 lb)   03/15/21 71.6 kg (157 lb 12.8 oz)   01/31/21 72.6 kg (160 lb)   10/02/17 75.2 kg (165 lb 12.8 oz)   09/19/16 70.9 kg (156 lb 3.2 oz)      Physical Exam  GENERAL: healthy, alert and no distress  EYES: Eyes grossly normal to inspection, PERRL and conjunctivae and sclerae normal  HENT: ear canals and TM's normal, nose and mouth without ulcers or lesions  NECK: no adenopathy, no asymmetry, masses, or scars and thyroid normal to palpation  RESP: lungs clear to auscultation - no rales, rhonchi or wheezes  CV: regular rate and rhythm, normal S1 S2, no S3 or S4, no murmur   ABDOMEN: soft, nontender, no hepatosplenomegaly, no masses and bowel sounds normal   (female): normal female external genitalia, normal urethral meatus, vaginal mucosa pink, moist, well rugated, and normal cervix, difficult exam for Donna today due to her discomfort.   MS: no gross musculoskeletal defects noted, no edema  SKIN: no suspicious lesions or rashes  NEURO: Normal strength and tone, mentation intact and speech normal  PSYCH: mentation appears normal, affect normal/bright    Diagnostic Test Results:  Labs reviewed in Epic    ASSESSMENT/PLAN:       ICD-10-CM    1. Routine general medical examination at a health care facility  Z00.00    2. Cold sore  B00.1 valACYclovir (VALTREX) 1000 mg tablet   3. Cervical cancer screening  Z12.4 Pap Screen with HPV - recommended age 30 - 65 years   4. Need for prophylactic vaccination and inoculation against influenza  Z23              COUNSELING:  Reviewed preventive health counseling, as reflected in patient instructions    Estimated body mass index is 28.68 kg/m  as calculated from the following:    Height as of this encounter: 1.645 m (5' 4.75\").    Weight as of this encounter: 77.6 kg (171 lb).    Weight management plan: diet and exercise    She " reports that she has never smoked. She has never used smokeless tobacco.      Counseling Resources:  ATP IV Guidelines  Pooled Cohorts Equation Calculator  Breast Cancer Risk Calculator  BRCA-Related Cancer Risk Assessment: FHS-7 Tool  FRAX Risk Assessment  ICSI Preventive Guidelines  Dietary Guidelines for Americans, 2010  USDA's MyPlate  ASA Prophylaxis  Lung CA Screening    Isadora Olmedo MD   [facility]

## 2022-09-14 LAB
BKR LAB AP GYN ADEQUACY: NORMAL
BKR LAB AP GYN INTERPRETATION: NORMAL
BKR LAB AP HPV REFLEX: NORMAL
BKR LAB AP PREVIOUS ABNORMAL: NORMAL
PATH REPORT.COMMENTS IMP SPEC: NORMAL
PATH REPORT.COMMENTS IMP SPEC: NORMAL
PATH REPORT.RELEVANT HX SPEC: NORMAL

## 2022-09-16 LAB
HUMAN PAPILLOMA VIRUS 16 DNA: NEGATIVE
HUMAN PAPILLOMA VIRUS 18 DNA: NEGATIVE
HUMAN PAPILLOMA VIRUS FINAL DIAGNOSIS: NORMAL
HUMAN PAPILLOMA VIRUS OTHER HR: NEGATIVE

## 2023-01-11 ENCOUNTER — OFFICE VISIT (OUTPATIENT)
Dept: DERMATOLOGY | Facility: CLINIC | Age: 38
End: 2023-01-11
Attending: FAMILY MEDICINE
Payer: COMMERCIAL

## 2023-01-11 DIAGNOSIS — L57.8 SUN-DAMAGED SKIN: ICD-10-CM

## 2023-01-11 DIAGNOSIS — D22.9 MULTIPLE PIGMENTED NEVI: ICD-10-CM

## 2023-01-11 DIAGNOSIS — D22.9 MULTIPLE BENIGN NEVI: ICD-10-CM

## 2023-01-11 DIAGNOSIS — D18.01 CHERRY ANGIOMA: ICD-10-CM

## 2023-01-11 DIAGNOSIS — Z12.83 SKIN EXAM, SCREENING FOR CANCER: Primary | ICD-10-CM

## 2023-01-11 DIAGNOSIS — L81.4 SOLAR LENTIGO: ICD-10-CM

## 2023-01-11 PROCEDURE — 99203 OFFICE O/P NEW LOW 30 MIN: CPT | Performed by: PHYSICIAN ASSISTANT

## 2023-01-11 ASSESSMENT — PAIN SCALES - GENERAL: PAINLEVEL: NO PAIN (0)

## 2023-01-11 NOTE — NURSING NOTE
Dermatology Rooming Note    Donna Dale's goals for this visit include:   Chief Complaint   Patient presents with     Skin Check     FBSE.  No spots of concern.      Jackie Hinton, CMA

## 2023-01-11 NOTE — PATIENT INSTRUCTIONS
Patient Education     Checking for Skin Cancer  You can find cancer early by checking your skin each month. There are 3 kinds of skin cancer. They are melanoma, basal cell carcinoma, and squamous cell carcinoma. Doing monthly skin checks is the best way to find new marks or skin changes. Follow the instructions below for checking your skin.   The ABCDEs of checking moles for melanoma   Check your moles or growths for signs of melanoma using ABCDE:   Asymmetry: the sides of the mole or growth don t match  Border: the edges are ragged, notched, or blurred  Color: the color within the mole or growth varies  Diameter: the mole or growth is larger than 6 mm (size of a pencil eraser)  Evolving: the size, shape, or color of the mole or growth is changing (evolving is not shown in the images below)    Checking for other types of skin cancer  Basal cell carcinoma or squamous cell carcinoma have symptoms such as:     A spot or mole that looks different from all other marks on your skin  Changes in how an area feels, such as itching, tenderness, or pain  Changes in the skin's surface, such as oozing, bleeding, or scaliness  A sore that does not heal  New swelling or redness beyond the border of a mole    Who s at risk?  Anyone can get skin cancer. But you are at greater risk if you have:   Fair skin, light-colored hair, or light-colored eyes  Many moles or abnormal moles on your skin  A history of sunburns from sunlight or tanning beds  A family history of skin cancer  A history of exposure to radiation or chemicals  A weakened immune system  If you have had skin cancer in the past, you are at risk for recurring skin cancer.   How to check your skin  Do your monthly skin checkups in front of a full-length mirror. Check all parts of your body, including your:   Head (ears, face, neck, and scalp)  Torso (front, back, and sides)  Arms (tops, undersides, upper, and lower armpits)  Hands (palms, backs, and fingers, including  under the nails)  Buttocks and genitals  Legs (front, back, and sides)  Feet (tops, soles, toes, including under the nails, and between toes)  If you have a lot of moles, take digital photos of them each month. Make sure to take photos both up close and from a distance. These can help you see if any moles change over time.   Most skin changes are not cancer. But if you see any changes in your skin, call your doctor right away. Only he or she can diagnose a problem. If you have skin cancer, seeing your doctor can be the first step toward getting the treatment that could save your life.   [employer] last reviewed this educational content on 4/1/2019 2000-2020 [employer]. [address] [place] [address], [place] [address]. All rights reserved. This information is not intended as a substitute for professional medical care. Always follow your healthcare professional's instructions.       When should I call my doctor?  If you are worsening or not improving, please, contact us or seek urgent care as noted below.     Who should I call with questions (adults)?  [facility] (adult and pediatric): [phone]  [facility] (adult): [phone]  For urgent needs outside of business hours call the [facility] at [phone] and ask for the dermatology resident on call to be paged  If this is a medical emergency and you are unable to reach an ER, Call [phone]    Who should I call with questions (pediatric)?  [facility]- Pediatric Dermatology  Dr. Mila Truong, Dr. Azalia Lr, Dr. Amelia Vivas, BENNIE Lockett, Dr. Aurora Duron, Dr. Yamini Phoenix & Dr. Daniel Weir  Non-urgent nurse triage line; [phone]- [facility] Coordinatortitus Henderson (/Complex ) [phone]    If you need a prescription refill, please contact your pharmacy. Refills are approved or denied by our  Physicians during normal business hours, Monday through Fridays  Per office policy, refills will not be granted if you have not been seen within the past year (or sooner depending on your child's condition)    Scheduling Information:  Pediatric Appointment Scheduling and Call Center [phone]  Radiology Scheduling- [phone]  Sedation Unit Scheduling- [phone]  [facility] Scheduling- General [phone]; Pediatric Dermatology [phone]  Main  Services: [phone]  Turkish: [phone]  Albanian: [phone]  Hmong/Citizen of [place]/Vietnamese: [phone]  Preadmission Nursing Department Fax Number: [phone] (Fax all pre-operative paperwork to this number)    For urgent matters arising during evenings, weekends, or holidays that cannot wait for normal business hours please call [phone] and ask for the dermatology resident on call to be paged.

## 2023-01-11 NOTE — LETTER
1/11/2023       RE: Donna Dale  [address]  [place] [address]     Dear Colleague,    Thank you for referring your patient, Donna Dale, to the [facility] [place] at [facility]. Please see a copy of my visit note below.    [facility] Dermatology Note  Encounter Date: Jan 11, 2023  Office Visit     Dermatology Problem List:  1. None    Social: Father with a history of AKs, history of mild burns. Wears sunscreen.  ____________________________________________    Assessment & Plan:    # Cherry Angioma(s) and Solar Lentigines  Discussed the natural history and benign nature of this lesion. Reassurance provided that no additional treatment is necessary.     # Multiple benign nevi.   - No concerning lesions today  - Monitor for ABCDEs of melanoma   - Continue sun protection - recommend SPF 30 or higher with frequent application   - Return sooner if noticing changing or symptomatic lesions    Procedures Performed:   None    Follow-up: 2 year(s) in-person, or earlier for new or changing lesions    Staff and Scribe:     Scribe Disclosure:  IRose Marie, am serving as a scribe to document services personally performed by Ines Kwong PA-C at this visit, based upon the provider's statements to me. All documentation has been reviewed by the aforementioned provider prior to being entered into the official medical record.     Rose Marie VALDEZ, a scribe, prepared the chart for today's encounter.     Provider Disclosure:   The documentation recorded by the scribe accurately reflects the services I personally performed and the decisions made by me.    All risks, benefits and alternatives were discussed with patient.  Patient is in agreement and understands the assessment and plan.  All questions were answered.  Sun Screen Education was given.   Return to Clinic 1-2 yrs or sooner as needed.   Ines  Varghese CRUZ   [facility]   ____________________________________________    CC: Skin Check (FBSE.  No spots of concern. )    HPI:  Ms. Donna Dale is a(n) 37 year old female who presents today as a new patient for a full body skin examination.     Of note, she was referred to Dermatology on 8/11/2022 by Dr. Olmedo for evaluation of multiple pigmented nevi.     Today, she notes that her father has had several spots removed that were precancerous. She denies a family history of melanoma. When prompted, she states that she has had several mild sunburns. She wears Neutrogena facial sunscreen. Patient is otherwise feeling well, without additional skin concerns.    Labs Reviewed:  N/A    Physical Exam:  Vitals: There were no vitals taken for this visit.  SKIN: Total skin including the undergarment areas was performed. The exam included the head/face, neck, both arms, chest, back, abdomen, both legs,and digits. Nails were not examined due to the presence of polish. Advised the patient to look for any dark marks/streaks when nail polish is not present.   - There are dome shaped bright red papules on the scalp, abdomen, and back.   - Multiple regular brown pigmented macules and papules are identified on the trunk and extremities.   - Scattered brown macules on sun exposed areas.  - No other lesions of concern on areas examined.     Medications:  Current Outpatient Medications   Medication     acetaminophen (TYLENOL) 325 MG tablet     ibuprofen (ADVIL/MOTRIN) 200 MG tablet     valACYclovir (VALTREX) 1000 mg tablet     No current facility-administered medications for this visit.      Past Medical History:   There is no problem list on file for this patient.    History reviewed. No pertinent past medical history.     CC Isadora Olmedo MD  [address] [place] [address] on close of this encounter.

## 2023-01-11 NOTE — PROGRESS NOTES
[facility] Dermatology Note  Encounter Date: Jan 11, 2023  Office Visit     Dermatology Problem List:  1. None    Social: Father with a history of AKs, history of mild burns. Wears sunscreen.  ____________________________________________    Assessment & Plan:    # Cherry Angioma(s) and Solar Lentigines  Discussed the natural history and benign nature of this lesion. Reassurance provided that no additional treatment is necessary.     # Multiple benign nevi.   - No concerning lesions today  - Monitor for ABCDEs of melanoma   - Continue sun protection - recommend SPF 30 or higher with frequent application   - Return sooner if noticing changing or symptomatic lesions    Procedures Performed:   None    Follow-up: 2 year(s) in-person, or earlier for new or changing lesions    Staff and Scribe:     Scribe Disclosure:  Rose Marie VALDEZ, am serving as a scribe to document services personally performed by Ines Kwong PA-C at this visit, based upon the provider's statements to me. All documentation has been reviewed by the aforementioned provider prior to being entered into the official medical record.     Rose Marie VALDEZ, a scribe, prepared the chart for today's encounter.     Provider Disclosure:   The documentation recorded by the scribe accurately reflects the services I personally performed and the decisions made by me.    All risks, benefits and alternatives were discussed with patient.  Patient is in agreement and understands the assessment and plan.  All questions were answered.  Sun Screen Education was given.   Return to Clinic 1-2 yrs or sooner as needed.   Ines Kwong PA-C   [facility]   ____________________________________________    CC: Skin Check (FBSE.  No spots of concern. )    HPI:  Ms. Donna Dale is a(n) 37 year old female who presents today as a new patient for a full body skin examination.     Of note, she was referred to Dermatology  on 8/11/2022 by Dr. Olmedo for evaluation of multiple pigmented nevi.     Today, she notes that her father has had several spots removed that were precancerous. She denies a family history of melanoma. When prompted, she states that she has had several mild sunburns. She wears Neutrogena facial sunscreen. Patient is otherwise feeling well, without additional skin concerns.    Labs Reviewed:  N/A    Physical Exam:  Vitals: There were no vitals taken for this visit.  SKIN: Total skin including the undergarment areas was performed. The exam included the head/face, neck, both arms, chest, back, abdomen, both legs,and digits. Nails were not examined due to the presence of polish. Advised the patient to look for any dark marks/streaks when nail polish is not present.   - There are dome shaped bright red papules on the scalp, abdomen, and back.   - Multiple regular brown pigmented macules and papules are identified on the trunk and extremities.   - Scattered brown macules on sun exposed areas.  - No other lesions of concern on areas examined.     Medications:  Current Outpatient Medications   Medication     acetaminophen (TYLENOL) 325 MG tablet     ibuprofen (ADVIL/MOTRIN) 200 MG tablet     valACYclovir (VALTREX) 1000 mg tablet     No current facility-administered medications for this visit.      Past Medical History:   There is no problem list on file for this patient.    History reviewed. No pertinent past medical history.     CC Isadora Olmedo MD  [address] [place] [address] on close of this encounter.

## 2023-08-14 ENCOUNTER — PATIENT OUTREACH (OUTPATIENT)
Dept: CARE COORDINATION | Facility: CLINIC | Age: 38
End: 2023-08-14
Payer: COMMERCIAL

## 2023-10-24 ENCOUNTER — TELEPHONE (OUTPATIENT)
Dept: OBGYN | Facility: CLINIC | Age: 38
End: 2023-10-24

## 2023-10-24 ENCOUNTER — VIRTUAL VISIT (OUTPATIENT)
Dept: OBGYN | Facility: CLINIC | Age: 38
End: 2023-10-24
Payer: COMMERCIAL

## 2023-10-24 VITALS — HEIGHT: 65 IN | BODY MASS INDEX: 28.68 KG/M2

## 2023-10-24 DIAGNOSIS — Z23 NEED FOR TDAP VACCINATION: ICD-10-CM

## 2023-10-24 DIAGNOSIS — O09.519 ENCOUNTER FOR SUPERVISION OF PRIMIGRAVIDA OF ADVANCED MATERNAL AGE: Primary | ICD-10-CM

## 2023-10-24 PROCEDURE — 99207 PR NO CHARGE NURSE ONLY: CPT

## 2023-10-24 RX ORDER — VITAMIN A ACETATE, .BETA.-CAROTENE, ASCORBIC ACID, CHOLECALCIFEROL, .ALPHA.-TOCOPHEROL ACETATE, DL-, THIAMINE MONONITRATE, RIBOFLAVIN, NIACINAMIDE, PYRIDOXINE HYDROCHLORIDE, FOLIC ACID, CYANOCOBALAMIN, CALCIUM CARBONATE, FERROUS FUMARATE, ZINC OXIDE, AND CUPRIC OXIDE 2000; 2000; 120; 400; 22; 1.84; 3; 20; 10; 1; 12; 200; 27; 25; 2 [IU]/1; [IU]/1; MG/1; [IU]/1; MG/1; MG/1; MG/1; MG/1; MG/1; MG/1; UG/1; MG/1; MG/1; MG/1; MG/1
TABLET ORAL
COMMUNITY
Start: 2023-05-01

## 2023-10-24 NOTE — TELEPHONE ENCOUNTER
[facility] Call Center    Phone Message    May a detailed message be left on voicemail: yes     Reason for Call: Other: Just an FYI, pt is scheduled for same-day appt today at 12:45 for a nurse ob intake phone call appt.      Action Taken: Other: [facility]    Travel Screening: Not Applicable

## 2023-10-24 NOTE — PROGRESS NOTES
Important Information for Provider:     New ob nurse intake by phone, first pregnancy, AMA. Handouts reviewed. Discussed genetic screening. Denies any problems at this time. Ultrasound and NOB with CNM 11/28/2023      Caffeine intake/servings daily - 1  Calcium intake/servings daily - 3  Exercise 5 times weekly - describe ; walks, precautions given  Sunscreen used - Yes  Seatbelts used - Yes  Guns stored in the home - No  Self Breast Exam - Yes  Pap test up to date -  Yes  Dental exam up to date -  Yes  Immunizations reviewed and up to date - Yes  Abuse: Current or Past (Physical, Sexual or Emotional) - No  Do you feel safe in your environment - Yes  Do you cope well with stress - Yes      Prenatal OB Questionnaire  Patient supplied answers from flow sheet for:  Prenatal OB Questionnaire.  Past Medical History  Have you ever recieved care for your mental health? : No  Have you ever been in a major accident or suffered serious trauma?: No  Within the last year, has anyone hit, slapped, kicked or otherwise hurt you?: No  In the last year, has anyone forced you to have sex when you didn't want to?: No    Past Medical History 2   Have you ever received a blood transfusion?: No  Would you accept a blood transfusion if was medically recommended?: Yes  Does anyone in your home smoke?: No   Is your blood type Rh negative?: No  Have you ever ?: No  Have you been hospitalized for a nonsurgical reason excluding normal delivery?: No  Have you ever had an abnormal pap smear?: No    Past Medical History (Continued)  Do you have a history of abnormalities of the uterus?: No  Did your mother take AUGUSTO or any other hormones when she was pregnant with you?: No  Do you have any other problems we have not asked about which you feel may be important to this pregnancy?: No                     Allergies as of 10/24/2023:    Allergies as of 10/24/2023    (No Known Allergies)               Early ultrasound screening tool:    Does  patient have irregular periods?  No  Did patient use hormonal birth control in the three months prior to positive urine pregnancy test? No  Is the patient breastfeeding?  No  Is the patient 10 weeks or greater at time of education visit?  No

## 2023-11-15 ENCOUNTER — IMMUNIZATION (OUTPATIENT)
Dept: FAMILY MEDICINE | Facility: CLINIC | Age: 38
End: 2023-11-15
Payer: COMMERCIAL

## 2023-11-15 DIAGNOSIS — Z23 ENCOUNTER FOR IMMUNIZATION: Primary | ICD-10-CM

## 2023-11-15 PROCEDURE — 99207 PR NO CHARGE NURSE ONLY: CPT

## 2023-11-15 PROCEDURE — 90480 ADMN SARSCOV2 VAC 1/ONLY CMP: CPT

## 2023-11-15 PROCEDURE — 91320 SARSCV2 VAC 30MCG TRS-SUC IM: CPT

## 2023-11-15 NOTE — PROGRESS NOTES
Prior to immunization administration, verified patients identity using patient s name and date of birth. Please see Immunization Activity for additional information.     Screening Questionnaire for Adult Immunization    Are you sick today?   No   Do you have allergies to medications, food, a vaccine component or latex?   No   Have you ever had a serious reaction after receiving a vaccination?   No   Do you have a long-term health problem with heart, lung, kidney, or metabolic disease (e.g., diabetes), asthma, a blood disorder, no spleen, complement component deficiency, a cochlear implant, or a spinal fluid leak?  Are you on long-term aspirin therapy?   No   Do you have cancer, leukemia, HIV/AIDS, or any other immune system problem?   No   Do you have a parent, brother, or sister with an immune system problem?   No   In the past 3 months, have you taken medications that affect  your immune system, such as prednisone, other steroids, or anticancer drugs; drugs for the treatment of rheumatoid arthritis, Crohn s disease, or psoriasis; or have you had radiation treatments?   No   Have you had a seizure, or a brain or other nervous system problem?   No   During the past year, have you received a transfusion of blood or blood    products, or been given immune (gamma) globulin or antiviral drug?   No   For women: Are you pregnant or is there a chance you could become       pregnant during the next month?   No   Have you received any vaccinations in the past 4 weeks?   No     Immunization questionnaire answers were all negative.    I have reviewed the following standing orders:   This patient is due and qualifies for the Covid-19 vaccine.     Click here for COVID-19 Standing Order    I have reviewed the vaccines inclusion and exclusion criteria; No concerns regarding eligibility.     Patient instructed to remain in clinic for 15 minutes afterwards, and to report any adverse reactions.     Screening performed by Sandra Loving MA  on 11/15/2023 at 2:17 PM.

## 2023-11-27 LAB
ABO/RH(D): NORMAL
ANTIBODY SCREEN: NEGATIVE
SPECIMEN EXPIRATION DATE: NORMAL

## 2023-11-28 ENCOUNTER — PRENATAL OFFICE VISIT (OUTPATIENT)
Dept: MIDWIFE SERVICES | Facility: CLINIC | Age: 38
End: 2023-11-28
Payer: COMMERCIAL

## 2023-11-28 ENCOUNTER — ANCILLARY PROCEDURE (OUTPATIENT)
Dept: ULTRASOUND IMAGING | Facility: CLINIC | Age: 38
End: 2023-11-28
Payer: COMMERCIAL

## 2023-11-28 VITALS
WEIGHT: 169 LBS | SYSTOLIC BLOOD PRESSURE: 129 MMHG | OXYGEN SATURATION: 100 % | TEMPERATURE: 98.6 F | BODY MASS INDEX: 28.34 KG/M2 | HEART RATE: 80 BPM | DIASTOLIC BLOOD PRESSURE: 82 MMHG

## 2023-11-28 DIAGNOSIS — O09.519 ENCOUNTER FOR SUPERVISION OF PRIMIGRAVIDA OF ADVANCED MATERNAL AGE: ICD-10-CM

## 2023-11-28 DIAGNOSIS — Z34.01 ENCOUNTER FOR SUPERVISION OF NORMAL FIRST PREGNANCY IN FIRST TRIMESTER: Primary | ICD-10-CM

## 2023-11-28 LAB
ALBUMIN UR-MCNC: NEGATIVE MG/DL
APPEARANCE UR: CLEAR
BILIRUB UR QL STRIP: NEGATIVE
COLOR UR AUTO: YELLOW
ERYTHROCYTE [DISTWIDTH] IN BLOOD BY AUTOMATED COUNT: 12.2 % (ref 10–15)
GLUCOSE UR STRIP-MCNC: NEGATIVE MG/DL
HCT VFR BLD AUTO: 36.6 % (ref 35–47)
HGB BLD-MCNC: 12.4 G/DL (ref 11.7–15.7)
HGB UR QL STRIP: NEGATIVE
KETONES UR STRIP-MCNC: NEGATIVE MG/DL
LEUKOCYTE ESTERASE UR QL STRIP: NEGATIVE
MCH RBC QN AUTO: 31.2 PG (ref 26.5–33)
MCHC RBC AUTO-ENTMCNC: 33.9 G/DL (ref 31.5–36.5)
MCV RBC AUTO: 92 FL (ref 78–100)
NITRATE UR QL: NEGATIVE
PH UR STRIP: 5.5 [PH] (ref 5–7)
PLATELET # BLD AUTO: 228 10E3/UL (ref 150–450)
RBC # BLD AUTO: 3.97 10E6/UL (ref 3.8–5.2)
SP GR UR STRIP: 1.02 (ref 1–1.03)
UROBILINOGEN UR STRIP-ACNC: 0.2 E.U./DL
WBC # BLD AUTO: 7 10E3/UL (ref 4–11)

## 2023-11-28 PROCEDURE — 85027 COMPLETE CBC AUTOMATED: CPT | Performed by: ADVANCED PRACTICE MIDWIFE

## 2023-11-28 PROCEDURE — 86780 TREPONEMA PALLIDUM: CPT | Performed by: ADVANCED PRACTICE MIDWIFE

## 2023-11-28 PROCEDURE — 86803 HEPATITIS C AB TEST: CPT | Performed by: ADVANCED PRACTICE MIDWIFE

## 2023-11-28 PROCEDURE — 86901 BLOOD TYPING SEROLOGIC RH(D): CPT | Performed by: ADVANCED PRACTICE MIDWIFE

## 2023-11-28 PROCEDURE — 81003 URINALYSIS AUTO W/O SCOPE: CPT | Performed by: ADVANCED PRACTICE MIDWIFE

## 2023-11-28 PROCEDURE — 87086 URINE CULTURE/COLONY COUNT: CPT | Performed by: ADVANCED PRACTICE MIDWIFE

## 2023-11-28 PROCEDURE — 99203 OFFICE O/P NEW LOW 30 MIN: CPT | Performed by: ADVANCED PRACTICE MIDWIFE

## 2023-11-28 PROCEDURE — 86850 RBC ANTIBODY SCREEN: CPT | Performed by: ADVANCED PRACTICE MIDWIFE

## 2023-11-28 PROCEDURE — 87340 HEPATITIS B SURFACE AG IA: CPT | Performed by: ADVANCED PRACTICE MIDWIFE

## 2023-11-28 PROCEDURE — 87389 HIV-1 AG W/HIV-1&-2 AB AG IA: CPT | Performed by: ADVANCED PRACTICE MIDWIFE

## 2023-11-28 PROCEDURE — 86900 BLOOD TYPING SEROLOGIC ABO: CPT | Performed by: ADVANCED PRACTICE MIDWIFE

## 2023-11-28 PROCEDURE — 36415 COLL VENOUS BLD VENIPUNCTURE: CPT | Performed by: ADVANCED PRACTICE MIDWIFE

## 2023-11-28 PROCEDURE — 86762 RUBELLA ANTIBODY: CPT | Performed by: ADVANCED PRACTICE MIDWIFE

## 2023-11-28 PROCEDURE — 76801 OB US < 14 WKS SINGLE FETUS: CPT | Performed by: OBSTETRICS & GYNECOLOGY

## 2023-11-28 NOTE — PROGRESS NOTES
10w1d   Donna Craven is a 38 year old who presents to the clinic for an new ob visit. She is a new CNM patient. She had dating US prior to appointment. Preliminary results WNL.  Estimated Date of Delivery: Jun 24, 2024 is calculated from Patient's last menstrual period was 09/18/2023.     She has not had bleeding since her LMP.   She has had mild nausea. Weigh loss has not occurred.   This was a planned pregnancy.   BHARAT is involved,  Silvino   OTHER CONCERNS: none, works as ICU RN    INFECTION HISTORY  HIV: no  Hepatitis B: no  Hepatitis C: no  Syphilis:  no  Tuberculosis: no   PPD- no  Herpes self: no  Herpes partner:  no  Chlamydia:  no  Gonorrhea:  no  HPV: no  BV:  no  Trichomonis:  no  Chicken Pox:  YES  ====================================================  GENETIC SCREENING  Genetic screening reviewed. High Risk? Will consider  ====================================================  PERSONAL/SOCIAL HISTORY  Lives lives with their spouse.  Employment: Full time.  Her job involves moderate activity as ICU RN  HX OF ABUSE: no  =====================================================   REVIEW OF SYSTEMS  CONSTITUTIONAL: NEGATIVE for fever, chills  EYES: NEGATIVE for vision changes   RESP: NEGATIVE for significant cough or SOB  CV: NEGATIVE for chest pain, palpitations   GI: NEGATIVE for nausea, abdominal pain, heartburn, or change in bowel habits  : NEGATIVE for frequency, dysuria, or hematuria  MUSCULOSKELETAL: NEGATIVE for significant arthralgias or myalgia  NEURO: NEGATIVE for weakness, dizziness or paresthesias or headache  ====================================================    PHYSICAL EXAM:  /82 (BP Location: Right arm, Patient Position: Sitting, Cuff Size: Adult Regular)   Pulse 80   Temp 98.6  F (37  C)   Wt 76.7 kg (169 lb)   LMP 09/18/2023   SpO2 100%   BMI 28.34 kg/m    BMI- Body mass index is 28.34 kg/m .,     RECOMMENDED WEIGHT GAIN: 25-35 lbs.  PHQ9- Today's Depression Rating was No  Value exists for the : #PHQ9  GENERAL:  Pleasant pregnant female, alert, well groomed.   SKIN:  Warm and dry, without lesions or rashes  HEAD: Symmetrical features.  EYES:  PERRLA,   MOUTH:  Buccal mucosa pink, moist without lesions.    NECK:  Thyroid without enlargement and nodules.  Lymph nodes not palpable.   LUNGS:  Clear to auscultation.  BREAST:  Symmetrical.  No dominant, fixed or suspicious masses are noted.  No skin or nipple changes or axillary nodes.  Self exam is taught and encouraged.  Nipples everted.      HEART:  RRR without murmur.  ABDOMEN: Soft without masses , tenderness or organomegaly.  No CVA tenderness. No scars noted.     MUSCULOSKELETAL:  Full range of motion  EXTREMITIES:  No edema. No significant varicosities.   PELVIC EXAM: deferred  =========================================  ASSESSMENT:  10w1d,   (Z34.01) Encounter for supervision of normal first pregnancy in first trimester  (primary encounter diagnosis)  Comment: NOB labs today      PREGNANCY AT RISK? no  ==========================================  PLAN:  Instructed on use of triage nurse line and contacting the on call CNM after hours for an urgent need such as fever, vagina bleeding, bladder or vaginal infection, rupture of membranes,  or term labor.    Discussed the indications, uses for and false positives for quad screen, nuchal translucency and fetal survey ultrasound at 18-20 weeks gestation. Will inform us at the next visit if she wished to avail herself of these screens.  Instructed on best evidence for: weight gain for her BMI for pregnancy; healthy diet and foods to avoid; exercise and activity during pregnancy;avoiding exposure to toxoplasmosis; and maintenance of a generally healthy lifestyle.   Discussed the harms, benefits, side effects and alternative therapies for current prescribed and OTC medications.    EDUARDA Bartholomew CNM

## 2023-11-29 LAB
BACTERIA UR CULT: NORMAL
HBV SURFACE AG SERPL QL IA: NONREACTIVE
HCV AB SERPL QL IA: NONREACTIVE
HIV 1+2 AB+HIV1 P24 AG SERPL QL IA: NONREACTIVE
RUBV IGG SERPL QL IA: 1.48 INDEX
RUBV IGG SERPL QL IA: POSITIVE
T PALLIDUM AB SER QL: NONREACTIVE

## 2023-12-16 ENCOUNTER — HEALTH MAINTENANCE LETTER (OUTPATIENT)
Age: 38
End: 2023-12-16

## 2023-12-22 ENCOUNTER — TRANSCRIBE ORDERS (OUTPATIENT)
Dept: MATERNAL FETAL MEDICINE | Facility: CLINIC | Age: 38
End: 2023-12-22

## 2023-12-22 ENCOUNTER — PRENATAL OFFICE VISIT (OUTPATIENT)
Dept: MIDWIFE SERVICES | Facility: CLINIC | Age: 38
End: 2023-12-22
Payer: COMMERCIAL

## 2023-12-22 VITALS
OXYGEN SATURATION: 100 % | HEART RATE: 88 BPM | BODY MASS INDEX: 28.91 KG/M2 | DIASTOLIC BLOOD PRESSURE: 74 MMHG | SYSTOLIC BLOOD PRESSURE: 123 MMHG | WEIGHT: 172.4 LBS

## 2023-12-22 DIAGNOSIS — O09.512 PRIMIGRAVIDA OF ADVANCED MATERNAL AGE IN SECOND TRIMESTER: Primary | ICD-10-CM

## 2023-12-22 DIAGNOSIS — O26.90 PREGNANCY RELATED CONDITION, ANTEPARTUM: Primary | ICD-10-CM

## 2023-12-22 PROCEDURE — 99207 PR PRENATAL VISIT: CPT | Performed by: ADVANCED PRACTICE MIDWIFE

## 2023-12-22 NOTE — PROGRESS NOTES
13w4d  Patient is feeling well. Denies any vaginal bleeding, water leaking, abdominal pain, or other concerns.   Discussed genetic testing. Would like NIPT testing, ordered today along with fetal anatomy scan. Will start baby aspirin. Going to [place] and [place] to visit her brother and his wife. Discussed movement and compression socks for traveling. Having some constipation, a little better now. Just moved to [employer]. 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 Silvino is a .   Danger signs discussed. Patient knows when to call triage and has numbers to call.   Follow up in 4 weeks.   Kiesha Anderson CNM

## 2024-01-17 ENCOUNTER — TELEPHONE (OUTPATIENT)
Dept: DERMATOLOGY | Facility: CLINIC | Age: 39
End: 2024-01-17
Payer: COMMERCIAL

## 2024-01-19 ENCOUNTER — PRENATAL OFFICE VISIT (OUTPATIENT)
Dept: MIDWIFE SERVICES | Facility: CLINIC | Age: 39
End: 2024-01-19
Payer: COMMERCIAL

## 2024-01-19 VITALS — BODY MASS INDEX: 29.01 KG/M2 | SYSTOLIC BLOOD PRESSURE: 120 MMHG | DIASTOLIC BLOOD PRESSURE: 74 MMHG | WEIGHT: 173 LBS

## 2024-01-19 DIAGNOSIS — Z36.1 NEED FOR MATERNAL SERUM ALPHA-PROTEIN (MSAFP) SCREENING: ICD-10-CM

## 2024-01-19 DIAGNOSIS — O09.512 PRIMIGRAVIDA OF ADVANCED MATERNAL AGE IN SECOND TRIMESTER: Primary | ICD-10-CM

## 2024-01-19 PROCEDURE — 36415 COLL VENOUS BLD VENIPUNCTURE: CPT | Performed by: ADVANCED PRACTICE MIDWIFE

## 2024-01-19 PROCEDURE — 99207 PR PRENATAL VISIT: CPT | Performed by: ADVANCED PRACTICE MIDWIFE

## 2024-01-19 PROCEDURE — 82105 ALPHA-FETOPROTEIN SERUM: CPT | Mod: 90 | Performed by: ADVANCED PRACTICE MIDWIFE

## 2024-01-19 PROCEDURE — 99000 SPECIMEN HANDLING OFFICE-LAB: CPT | Performed by: ADVANCED PRACTICE MIDWIFE

## 2024-01-19 NOTE — PROGRESS NOTES
17w4d  Donan is feeling well today. Here with Silvino. Recently broke her toe but feeling mostly recovered. Is planning NIPT but has not yet met with M due to traveling over holidays. Will complete msAFP today to screen for spinal cord defects. Has [facility] number to schedule genetic counseling and anatomy US. RTC in 4 weeks.    EDUARDA BartholomewM

## 2024-01-22 LAB
# FETUSES US: NORMAL
AFP MOM SERPL: 1.01
AFP SERPL-MCNC: 39 NG/ML
AGE - REPORTED: 36.9 YR
CURRENT SMOKER: NO
FAMILY MEMBER DISEASES HX: NO
GA METHOD: NORMAL
GA: NORMAL WK
IDDM PATIENT QL: NO
INTEGRATED SCN PATIENT-IMP: NORMAL
SPECIMEN DRAWN SERPL: NORMAL

## 2024-02-14 ENCOUNTER — PRE VISIT (OUTPATIENT)
Dept: MATERNAL FETAL MEDICINE | Facility: CLINIC | Age: 39
End: 2024-02-14
Payer: COMMERCIAL

## 2024-02-19 ENCOUNTER — OFFICE VISIT (OUTPATIENT)
Dept: MATERNAL FETAL MEDICINE | Facility: CLINIC | Age: 39
End: 2024-02-19
Attending: OBSTETRICS & GYNECOLOGY
Payer: COMMERCIAL

## 2024-02-19 ENCOUNTER — TRANSFERRED RECORDS (OUTPATIENT)
Dept: HEALTH INFORMATION MANAGEMENT | Facility: CLINIC | Age: 39
End: 2024-02-19

## 2024-02-19 ENCOUNTER — HOSPITAL ENCOUNTER (OUTPATIENT)
Dept: ULTRASOUND IMAGING | Facility: CLINIC | Age: 39
Discharge: HOME | End: 2024-02-19
Attending: OBSTETRICS & GYNECOLOGY
Payer: COMMERCIAL

## 2024-02-19 ENCOUNTER — PRENATAL OFFICE VISIT (OUTPATIENT)
Dept: MIDWIFE SERVICES | Facility: CLINIC | Age: 39
End: 2024-02-19
Payer: COMMERCIAL

## 2024-02-19 ENCOUNTER — LAB (OUTPATIENT)
Dept: LAB | Facility: CLINIC | Age: 39
End: 2024-02-19
Attending: OBSTETRICS & GYNECOLOGY
Payer: COMMERCIAL

## 2024-02-19 ENCOUNTER — MEDICAL CORRESPONDENCE (OUTPATIENT)
Dept: HEALTH INFORMATION MANAGEMENT | Facility: CLINIC | Age: 39
End: 2024-02-19

## 2024-02-19 VITALS
HEART RATE: 79 BPM | SYSTOLIC BLOOD PRESSURE: 116 MMHG | OXYGEN SATURATION: 100 % | WEIGHT: 180 LBS | DIASTOLIC BLOOD PRESSURE: 72 MMHG | BODY MASS INDEX: 30.19 KG/M2

## 2024-02-19 DIAGNOSIS — O09.522 MULTIGRAVIDA OF ADVANCED MATERNAL AGE IN SECOND TRIMESTER: Primary | ICD-10-CM

## 2024-02-19 DIAGNOSIS — O09.519 AMA (ADVANCED MATERNAL AGE) PRIMIGRAVIDA 35+: ICD-10-CM

## 2024-02-19 DIAGNOSIS — O09.512 AMA (ADVANCED MATERNAL AGE) PRIMIGRAVIDA 35+, SECOND TRIMESTER: Primary | ICD-10-CM

## 2024-02-19 DIAGNOSIS — Z84.81 FAMILY HISTORY OF GENETIC DISEASE CARRIER: ICD-10-CM

## 2024-02-19 DIAGNOSIS — O26.90 PREGNANCY RELATED CONDITION, ANTEPARTUM: ICD-10-CM

## 2024-02-19 DIAGNOSIS — Z36.3 ENCOUNTER FOR ROUTINE SCREENING FOR FETAL MALFORMATION USING ULTRASOUND: ICD-10-CM

## 2024-02-19 PROCEDURE — 36415 COLL VENOUS BLD VENIPUNCTURE: CPT

## 2024-02-19 PROCEDURE — 99207 PR PRENATAL VISIT: CPT | Performed by: ADVANCED PRACTICE MIDWIFE

## 2024-02-19 PROCEDURE — 76811 OB US DETAILED SNGL FETUS: CPT

## 2024-02-19 PROCEDURE — 96040 HC GENETIC COUNSELING, EACH 30 MINUTES: CPT

## 2024-02-19 PROCEDURE — 76811 OB US DETAILED SNGL FETUS: CPT | Mod: 26 | Performed by: OBSTETRICS & GYNECOLOGY

## 2024-02-19 NOTE — PROGRESS NOTES
S: Feels well,  Has started feeling fetal movement.  Denies uterine cramping, vaginal bleeding or leaking of fluid.    Had anatomy US with M prior to her appt today d/t AMA.   EFW 76%    O: Vitals: /72   Pulse 79   Wt 81.6 kg (180 lb)   LMP 09/18/2023   SpO2 100%   BMI 30.19 kg/m    BMI= Body mass index is 30.19 kg/m .  Exam:  Constitutional: healthy, alert and no distress  Respiratory: respirations even and unlabored  Gastrointestinal: Abdomen soft, non-tender. Fundus measures appropriate for gestational age. Fetal heart tones hear without difficulty and within normal limits  : Deferred  Psychiatric: mentation appears normal and affect normal/bright  A: No diagnosis found.  P: Discussed 20 week fetal screen.   Discussed GCT/repeat RPR for next visit, handout provided, reminded of longer appointment.  Encouraged patient to call with any questions or concerns.      EDUARDA Naranjo, CNM

## 2024-02-19 NOTE — PROGRESS NOTES
Please refer to ultrasound report under 'Imaging' Studies of 'Chart Review' tabs.    Manav Escalante M.D.

## 2024-02-19 NOTE — PROGRESS NOTES
[facility]  Genetic Counseling Consult    Patient:  Donna Craven YOB: 1985   Date of Service:  24   MRN: [id number]    Donna was seen at the [facility] for genetic consultation. The indication for genetic counseling is advanced maternal age and desire to discuss options for genetic screening and diagnostics. The patient was accompanied to this visit by their , Silvino.    The session was conducted in English.      IMPRESSION/ PLAN   1. Donna has not had genetic screening in this pregnancy but elected to have screening today.     2. During today's [facility] visit, Donna had a blood draw for non-invasive prenatal testing (also called NIPT, NIPS, or cell-free DNA) through [facility] This NIPT screens for trisomy 21, 18, and 13 and the patient opted to NOT screen for sex chromosome aneuploidies. Results are expected in 7-10 days. The patient will be called with results and if they do not answer they requested a detailed message with results on their voicemail.  Patient was informed that results will be available in [employer].     3. During today's [facility] visit, Donna had a blood draw for carrier screening. Results are expected in 2-3 weeks. We will contact the couple to discuss the results. The couple requested that we contact Donna with results once both are available. She provided verbal permission for results to be left in her voicemail. Authorization to share protected health information was signed today's visit.     4. Donna had a level II comprehensive anatomy ultrasound today. Please see the ultrasound report for further details.    PREGNANCY HISTORY   /Parity:       This is Donna's first pregnancy.  CURRENT PREGNANCY   Current Age: 38 year old     Age at Delivery: 38 year old    ARIC: 2024, by Last Menstrual Period                                     Gestational Age:  "22w0d    This pregnancy is a single gestation.     This pregnancy was conceived spontaneously.    Donna reports no bleeding, complications, illnesses, fever or exposure concerns with this pregnancy.     MEDICAL HISTORY   Donna s reported medical history is not expected to impact pregnancy management or risks to fetal development.        FAMILY HISTORY   A three-generation pedigree was obtained today and is scanned under the \"Media\" tab in Epic. The family history was reported by Donna and their partner.    The following significant findings were reported today:   Donna's father has epilepsy due to a motor vehicle accident  Donna's maternal uncle had an isolated club foot   Donna's maternal uncle has hemochromatosis which caused liver failure. Donna reports that her parents were testing and at least one of them is a carrier, most likely her mother.   The father of the baby and Donna's , Silvino (33yrs) is healthy   Silvino's maternal uncle  in infancy. He is unsure of the cause. Without any other information about this relative, a complete risk assessment for Donna's pregnancy is not possible.  Silvino's uncle passed away in his 50s from cancer. He is unsure  of the primary cancer.     Otherwise, the reported family history is unremarkable for multiple miscarriages, stillbirths, birth defects, intellectual disabilities, known genetic conditions, and consanguinity.       RISK ASSESSMENT FOR CHROMOSOME CONDITIONS   We explained that the risk for fetal chromosome abnormalities increases with maternal age. We discussed specific features of common chromosome abnormalities, including Down syndrome, trisomy 13, trisomy 18, and sex chromosome trisomies.    At age 38 at midtrimester, the risk to have a baby with Down syndrome is 1 in 216.   At age 38 at midtrimester, the risk to have a baby with any chromosome abnormality is 1 in 105.     Donna had screening earlier in this pregnancy. Maternal serum AFP " only to screen for open neural tube defects (after 15 weeks) results were within normal limits at a 1.01 MoM, which decreased the risk of an open neural tube defect to 1 in 10,000.     RISK ASSESSMENT FOR INHERITED CONDITIONS   We discussed that every pregnancy has a chance to have an inherited single-gene condition, even when there is no family history of that condition. In fact, approximately 90% of couples at an increased reproductive risk for an inherited condition have no family history of that condition. The average person may be a carrier for 5-10 different genetic variants that can increase the chance for their pregnancies to have that condition. We discussed autosomal recessive conditions and X-linked conditions. Autosomal recessive conditions happen when a mutation has been inherited from the egg and sperm and include conditions like cystic fibrosis, thalassemia, hearing loss, spinal muscular atrophy, and more. X-linked conditions happen when a mutation has been inherited from the egg and include conditions like fragile X syndrome.     We reviewed that when both biological parents carry a harmful genetic change in a gene associated with autosomal recessive inheritance, each of their pregnancies has a 1 in 4 (25%) chance to be affected by that condition. With x-linked conditions, the specific risk generally depends on the chromosomal sex of the fetus, with XY individuals (generally male) being most severely affected.      screening was not reviewed due to focus on other topics.  About [place] Screening    The patient does have a family history of a known inherited condition. Donna's maternal uncle has hemochromatosis she unsure of the exact type . The patient nor their partner have had carrier screening previously.  The patient and their partner pursued carrier screening today for 421 conditions through [facility].  This panel screens for Hemochromatosis type 2 and 3. Type 1, the most common form of  the disorder, and type 4 (also called ferroportin disease) begin in adulthood. Men with type 1 or type 4 hemochromatosis typically develop symptoms between the ages of 40 and 60, and women usually develop symptoms after menopause.See below for the more detailed information we discussed. See separate documentation in partners chart.     We discussed that expanded carrier screening is designed to identify carrier status for conditions that are primarily childhood or adolescent onset. Expanded carrier screening does not evaluate for adult-onset conditions such as hereditary cancer syndromes, dementia/ Alzheimer's disease, or cardiovascular disease risk factors. Additionally, expanded carrier screening is not comprehensive for all known genetic diseases or inherited conditions. It will not intentionally screen for autosomal dominant conditions. This is a screening test, and residual carrier status risk figures will be provided to the patient after results become available. Carrier screening is not meant to diagnose the patient with a condition, and generally carriers are asymptomatic. However, certain genes may confer increased risks for various health concerns in carriers (including, but not limited to: GOOD, DMD, FMR1).    We discussed that carrier screening can have implications for other family members and that couples are encouraged to share positive results with siblings and other family members of reproductive age. Additionally, even if there is not a high reproductive risk for a condition, it is possible that carrier status can be passed on to future generations.    We reviewed that there is a law in place, the Genetic Information Nondiscrimination Act (MARGA), that protects patients from discrimination by health insurance companies and employers based on their genetic information. [place] does not protect against discrimination by life insurance companies or disability insurance.    Carrier screening will report on  variants classified by the lab as pathogenic or likely pathogenic. Although carrier status does not change over time, it is possible that a variant could be reclassified as more information about the variant is learned. If this occurs, the couple will be contacted and a new risk assessment will be provided.     GENETIC TESTING OPTIONS   Genetic testing during a pregnancy includes screening and diagnostic procedures.      Screening tests are non-invasive which means no risk to the pregnancy and includes ultrasounds and blood work. The benefits and limitations of screening were reviewed. Screening tests provide a risk assessment (chance) specific to the pregnancy for certain fetal chromosome abnormalities but cannot definitively diagnose or exclude a fetal chromosome abnormality. Follow-up genetic counseling and consideration of diagnostic testing is recommended with any abnormal screening result. Diagnostic testing during a pregnancy is more certain and can test for more conditions. However, the tests do have a risk of miscarriage that requires careful consideration. These tests can detect fetal chromosome abnormalities with greater than 99% certainty. Results can be compromised by maternal cell contamination or mosaicism and are limited by the resolution of current genetic testing technology.     There is no screening or diagnostic test that detects all forms of birth defects or intellectual disability.     We discussed the following screening options:   Non-invasive prenatal testing (NIPT)  Also called cell-free DNA screening because it detect chromosome fragments from the placenta in the pregnant person's blood.  Can be done any time after 10 weeks gestation.  Screens for trisomy 21, trisomy 18, trisomy 13, and sex chromosome aneuploidies. ACMG also recommends consideration of screening for 22q11.2 microdeletion syndrome.  Does not screen for all known chromosomal conditions.  Even with negative results, a  residual risk for screened conditions remains.  Cannot screen for open neural tube defects, maternal serum AFP after 15 weeks is recommended    Carrier screening  Risk assessment for certain autosomal recessive and x-linked conditions. These conditions are generally infantile- or childhood-onset conditions.   Can be done any time during the pregnancy or prior to pregnancy.  Can screen for over 500 different genetic conditions.  Is not intended to diagnosis a condition in the carrier parent.    Even with negative results, a residual risk for screened conditions remains.      We discussed the following ultrasound options:  Comprehensive level II ultrasound (Fetal Anatomy Ultrasound)  Ultrasound done between 18-20 weeks gestation  Screens for major birth defects and markers for aneuploidy (like trisomy 21 and trisomy 18)  Includes looking at the fetus/baby's growth, heart, organs (stomach, kidneys), placenta, and amniotic fluid    We discussed the following diagnostic options:   Amniocentesis  Invasive diagnostic procedure done after 15 weeks gestation  The procedure collects a small sample of amniotic fluid for the purpose of chromosomal testing and/or other genetic testing  Diagnostic result; more than 99% sensitivity for fetal chromosome abnormalities  Testing for AFP in the amniotic fluid can test for open neural tube defects      It was a pleasure to be involved with [facility] care. Face-to-face time of the meeting was 45 minutes.    SHAI SPEAR MS, [facility]  Genetic Counselor  [facility]  Maternal Fetal Medicine  Office: [phone]   [facility]: [phone]   Fax: [phone]  [facility]

## 2024-02-19 NOTE — PATIENT INSTRUCTIONS
[email or web address]

## 2024-02-26 ENCOUNTER — TELEPHONE (OUTPATIENT)
Dept: MATERNAL FETAL MEDICINE | Facility: CLINIC | Age: 39
End: 2024-02-26
Payer: COMMERCIAL

## 2024-02-26 LAB — SCANNED LAB RESULT: NORMAL

## 2024-02-26 NOTE — TELEPHONE ENCOUNTER
February 26, 2024    I left a message for Donna regarding her NIPT results.     Results indicate NO ANEUPLOIDY DETECTED for chromosomes 21, 18, and 13.    This puts her current pregnancy at low risk for Down syndrome, trisomy 18, trisomy 13 and sex chromosome abnormalities. This test is reported to have the following sensitivities: Down syndrome- >99.9%, trisomy 18- >99.9%, and trisomy 13- >99.9%. Although these results are reassuring, this does not replace a standard chromosome analysis from a chorionic villus sampling or amniocentesis.     Level II ultrasound is recommended, given AMA.     MSAFP is the appropriate second trimester screening test for open neural tube defects; the maternal quad screen is not recommended.    This information was left in Donna's voicemail per plan established at her genetic counseling appointment, and Donna was encouraged to reach out if she has any questions or concerns.      Her results are available in her Epic chart for her primary OB to review.    SHAI SPEAR MS, [facility]  Genetic Counselor  [facility]  Maternal Fetal Medicine  Office: [phone]   [facility]: [phone]   Fax: [phone]  [facility]

## 2024-03-04 LAB — SCANNED LAB RESULT: ABNORMAL

## 2024-03-06 ENCOUNTER — TELEPHONE (OUTPATIENT)
Dept: MATERNAL FETAL MEDICINE | Facility: CLINIC | Age: 39
End: 2024-03-06
Payer: COMMERCIAL

## 2024-03-06 NOTE — TELEPHONE ENCOUNTER
Carrier Screening Results Disclosure  [facility] Maternal Fetal Medicine    I left a voicemail for Donna today to review her carrier screening results (421 gene panel through [facility]). Her partner, Silvino, also had carrier screening performed (381 gene panel through [facility]). His screening included the same genes as Donna's screening other than X-linked conditions, such as Fragile X Syndrome. Silvino previously signed an authorization to share protected information form to discuss his results with Donna. Overall, Donna and Silvino were not found to be a carrier for the same condition.     Most of the conditions on the carrier screening panel are inherited in an autosomal recessive fashion. Every individual has two copies of the gene that is responsible for this condition, and if someone has a change or mutation that impacts how one copy of the gene functions, they are called a carrier for the condition.  If someone has two copies of the gene that have a harmful change, they are affected with the condition.  If two people who are carriers for the same condition have children, there is a chance for their children to be affected.  Each parent has a 50% chance for passing on their copy of the gene with a mutation, so there is a 25% chance for each pregnancy to get two copies of the mutation and be affected, a 50% chance for each pregnancy to be an unaffected carrier, and a 25% chance to be unaffected with two normal copies of the gene.    Donna screened negative for 420 conditions and was found to harbor pathogenic variants in 1 gene. Silvino screened negative for 378 conditions and was found to harbor pathogenic variants in 3 genes. Positive results and their respective reproductive risks are outlined below.    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 This condition is characterized by an inability for the kidneys to filter waste from the blood.   This condition typically leads to end-stage renal disease by early childhood requiring a kidney transplant, though this variant is generally not associated with childhood-onset forms of the condition.   [employer] reports that this variant is generally not associated with disease in the homozygous state, but can be pathogenic if inherited with another known pathogenic mutation. The combination of this variant with certain other NPHS2 variants typically results in a later-onset form of the condition that is slower progressing.   Since Silvino was NOT identified to be a carrier of this condition, the residual reproductive risk is  is significantly reduced.    Silvino was found to be a carrier of    1. 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 Affected fetuses that have XY (chromosomally \"male\") have external genitalia that appear female or can be ambiguous.  Often, the condition is diagnosed in puberty. During puberty, people with this condition develop some male secondary sex characteristics, such as increased muscle mass, deepening of the voice, and development of male pattern facial and body hair. In addition to these changes typical of adolescent boys, some affected individuals may also experience breast enlargement (gynecomastia). Despite having testes, people with this disorder are generally unable to father children (infertile).  About half of individuals with this condition have identify with their sex assigned at birth based on external genitalia and half do not.   Since Donna was NOT identified to be a carrier of this condition, the residual reproductive risk is  is significantly reduced.  2. ILX80G-hfawdqd conditions (WNT10A: c.682T>A):  These conditions  include autosomal recessive " odonto-onycho-dermal dysplasia (OODD) and Wicöwu-Amhcvh-Nflduqld syndrome (SSPS) and autosomal dominant isolated tooth agenesis. People who are carriers of the autosomal recessive conditions may be at risk to develop the autosomal dominant condition.   OODD and SSPS refer to a spectrum of features associated with ectodermal dysplasia (ED), which causes abnormal development of the skin, hair, nails, teeth, and sweat glands. Isolated tooth agenesis results in the absence of one or more teeth, typically secondary teeth. Some individuals with tooth agenesis can have mild symptoms of ED.  This particular variant is a low penetrance variant, meaning that not all individuals with this variant will have associated symptoms.   Since Donna was NOT identified to be a carrier of this condition, the residual reproductive risk is  is significantly reduced.  3. Pseudocholinesterase Deficiency (BCHE: c.293A>G):  Pseudocholinesterase Deficiency (also known as Succinylcholine Sensitivity or Butyrylcholinesterase  Deficiency) is an inherited disorder that causes an affected person to become temporarily paralyzed and stop breathing (apnea) when certain medications are used, especially specific muscle relaxants such as succinylcholine (suxamethonium) and mivacurium, which are typically given as part of anesthesia for surgeries. Affected individuals have no symptoms unless they are given these medications.  Since Donna was NOT identified to be a carrier of this condition, the residual reproductive risk is significantly reduced.    Reviewed that hemochromatosis type 1 and 4 are not typically on carrier screen panels because they are adult onset. Type 1, the most common form of the disorder, and type 4 (also called ferroportin disease) begin in adulthood. Men with type 1 or type 4 hemochromatosis typically develop symptoms between the ages of 40 and 60, and women usually develop symptoms after menopause.     Pseudodeficiency  Alleles    We discussed that the presence of a pseudodeficiency allele does not impact the risk to be a carrier. Carrier testing for reproductive partners is not indicated based on these results. People with pseudodeficiency alleles may have false positive results on biochemical tests such as  screening. Pseudodeficiency alleles are not known to cause disease, even when homozygous or in combination with another disease-causing variant (compound heterozygous).  Donna was encouraged to share these results with a provider should she have a child screen positive on  screening.    Familial Screening    Donna screened positive as a carrier of 3 autosomal recessive conditions. She is not expected to have symptoms. For each respective gene related to these conditions, each child would have a 50% chance to also be a carrier. Genetic counseling for the couple's children is recommended when they are of reproductive age. Donna's (and Kyung) other first degree relatives also have a 50% risk to carry each of these conditions. We discussed sharing this information with family members who could be at risk to be carriers. I am happy to be an information resource for other family members and to help coordinate carrier screening if desired.    No further screening or testing for the couple or a future pregnancy is indicated.  Again, while the chances of the aforementioned conditions are low, because the detection rate of testing is not 100%, if there is ever concern for symptoms in the couple's children in the future, referral to an appropriate practitioner for evaluation is recommended.         It is a pleasure to be involved in Donna's care.      SHAI SPEAR MS, [facility]  Genetic Counselor   [facility]  Maternal Fetal Medicine  Office: [phone]   [facility]: [phone]   Fax: [phone]  [facility]

## 2024-03-21 ENCOUNTER — PRENATAL OFFICE VISIT (OUTPATIENT)
Dept: MIDWIFE SERVICES | Facility: CLINIC | Age: 39
End: 2024-03-21
Payer: COMMERCIAL

## 2024-03-21 VITALS
WEIGHT: 187.38 LBS | BODY MASS INDEX: 31.42 KG/M2 | TEMPERATURE: 98.1 F | DIASTOLIC BLOOD PRESSURE: 76 MMHG | SYSTOLIC BLOOD PRESSURE: 118 MMHG | HEART RATE: 90 BPM

## 2024-03-21 DIAGNOSIS — O09.522 MULTIGRAVIDA OF ADVANCED MATERNAL AGE IN SECOND TRIMESTER: Primary | ICD-10-CM

## 2024-03-21 LAB
GLUCOSE 1H P 50 G GLC PO SERPL-MCNC: 101 MG/DL (ref 70–129)
HGB BLD-MCNC: 12.1 G/DL (ref 11.7–15.7)
HOLD SPECIMEN: NORMAL

## 2024-03-21 PROCEDURE — 36415 COLL VENOUS BLD VENIPUNCTURE: CPT | Performed by: ADVANCED PRACTICE MIDWIFE

## 2024-03-21 PROCEDURE — 99207 PR PRENATAL VISIT: CPT | Performed by: ADVANCED PRACTICE MIDWIFE

## 2024-03-21 PROCEDURE — 82950 GLUCOSE TEST: CPT | Performed by: ADVANCED PRACTICE MIDWIFE

## 2024-03-21 RX ORDER — ASPIRIN 81 MG/1
81 TABLET, CHEWABLE ORAL DAILY
Status: ON HOLD | COMMUNITY
End: 2024-07-06

## 2024-03-21 ASSESSMENT — ANXIETY QUESTIONNAIRES
IF YOU CHECKED OFF ANY PROBLEMS ON THIS QUESTIONNAIRE, HOW DIFFICULT HAVE THESE PROBLEMS MADE IT FOR YOU TO DO YOUR WORK, TAKE CARE OF THINGS AT HOME, OR GET ALONG WITH OTHER PEOPLE: NOT DIFFICULT AT ALL
6. BECOMING EASILY ANNOYED OR IRRITABLE: NOT AT ALL
3. WORRYING TOO MUCH ABOUT DIFFERENT THINGS: NOT AT ALL
GAD7 TOTAL SCORE: 0
7. FEELING AFRAID AS IF SOMETHING AWFUL MIGHT HAPPEN: NOT AT ALL
5. BEING SO RESTLESS THAT IT IS HARD TO SIT STILL: NOT AT ALL
1. FEELING NERVOUS, ANXIOUS, OR ON EDGE: NOT AT ALL
GAD7 TOTAL SCORE: 0
2. NOT BEING ABLE TO STOP OR CONTROL WORRYING: NOT AT ALL

## 2024-03-21 ASSESSMENT — PATIENT HEALTH QUESTIONNAIRE - PHQ9
SUM OF ALL RESPONSES TO PHQ QUESTIONS 1-9: 1
5. POOR APPETITE OR OVEREATING: NOT AT ALL

## 2024-03-21 NOTE — PATIENT INSTRUCTIONS
"Weeks 22 to 26 of Your Pregnancy: Care Instructions  Your baby's lungs are getting ready for breathing. Your baby may respond to your voice. Your baby likely turns less, and kicks or jerks more. Jerking may mean that your baby has hiccups.    Think about taking childbirth classes. And start to think about whether you want to have pain medicine during labor.    At your next doctor visit, you may be tested for anemia and for high blood sugar that first occurs during pregnancy (gestational diabetes). These conditions can cause problems for you and your baby.    To ease discomfort, such as back pain    Change your position often. Try not to sit or stand for too long.  Get some exercise. Things like walking or stretching may help.  Try using a heating pad or cold pack.    To ease or reduce swelling in your feet, ankles, hands, and fingers    Take off your rings.  Avoid high-sodium foods, such as potato chips.  Prop up your feet, and sleep with pillows under your feet.  Try to avoid standing for long periods of time.  Do not wear tight shoes.  Wear support stockings.  Kegel exercises to prevent urine from leaking    Squeeze your muscles as if you were trying not to pass gas. Your belly, legs, and buttocks shouldn't move. Hold the squeeze for 3 seconds, then relax for 5 to 10 seconds.    Add 1 second each week until you can squeeze for 10 seconds. Repeat the exercise 10 times a session. Do 3 to 8 sessions a day. If these exercises cause you pain, stop doing them and talk with your doctor.  Follow-up care is a key part of your treatment and safety. Be sure to make and go to all appointments, and call your doctor if you are having problems. It's also a good idea to know your test results and keep a list of the medicines you take.  Where can you learn more?  Go to https://www.healthwise.net/patiented  Enter G264 in the search box to learn more about \"Weeks 22 to 26 of Your Pregnancy: Care Instructions.\"  Current as of: July " 10, 2023               Content Version: 14.0    9773-7806 [employer].   Care instructions adapted under license by your healthcare professional. If you have questions about a medical condition or this instruction, always ask your healthcare professional. [employer] disclaims any warranty or liability for your use of this information.      Learning About Screening for Gestational Diabetes  What is gestational diabetes screening?     Screening for gestational diabetes is a way to look for high blood sugar during pregnancy. You drink some very sweet liquid. Then you have a blood test to see how your body uses sugar (glucose).  How is gestational diabetes screening done?  Screening for gestational diabetes may be done in a couple of ways.  Two-part screening.  Part one (glucose challenge test): A blood sample is taken after you drink a liquid that contains sugar (glucose). You don't need to stop eating or drinking before this test. If the test shows that you don't have a lot of sugar in your blood, you don't have gestational diabetes.  Part two (oral glucose tolerance test, or OGTT): If the first test shows a lot of sugar in your blood, then you may have an OGTT. You can't eat or drink for at least 8 hours before this test. A blood sample is taken, then you drink a sweet liquid. You have more blood tests after 1 to 3 hours. If the OGTT shows that you have a lot of sugar in your blood, you may have gestational diabetes.  One-part screening.  Sometimes doctors use the OGTT on its own. If the test shows that you don't have a lot of sugar in your blood, you don't have gestational diabetes. If you do have a lot of sugar in your blood, you may have the condition.  What are the risks of screening?  Your blood glucose level may drop very low toward the end of the test. If this happens, you may feel weak, hungry, and restless. Tell your doctor if you have these symptoms. The test usually will be  "stopped.  You may vomit after drinking the sweet liquid. If this happens, you may need to take the test at a later time.  Your doctor may do more glucose tests at other times during your pregnancy.  Follow-up care is a key part of your treatment and safety. Be sure to make and go to all appointments, and call your doctor if you are having problems. It's also a good idea to know your test results and keep a list of the medicines you take.  Where can you learn more?  Go to https://www.[employer].net/patiented  Enter A472 in the search box to learn more about \"Learning About Screening for Gestational Diabetes.\"  Current as of: 2023               Content Version: 14.0    8243-1633 [employer].   Care instructions adapted under license by your healthcare professional. If you have questions about a medical condition or this instruction, always ask your healthcare professional. [employer] disclaims any warranty or liability for your use of this information.      You have been provided the My Labor and Birth Wishes document.  Please review at home and bring to your next prenatal visit. Bring this sheet to the hospital for your birth. Give copies to your care team members and support person.   Additional copies can be found here:  [email or web address]  Weeks 26 to 30 of Your Pregnancy: Care Instructions  You're starting your last trimester. You'll probably feel your baby moving around more. Your back may ache as your body gets used to your baby's size and length. Take care of yourself, and pay attention to what your body needs.    Talk to your doctor about getting the Tdap shot. It will help protect your  against whooping cough (pertussis). Also ask your doctor about flu and COVID-19 shots if you haven't had them yet. If your blood type is Rh negative, you may be given a shot of Rh immune globulin (such as RhoGAM). It can help prevent problems for your baby.    You may have " "Bonner-Edwards contractions. They are single or several strong contractions without a pattern. These are practice contractions but not the start of labor.  Be kind to yourself.     Take breaks when you're tired.  Change positions often. Don't sit for too long or stand for too long.  At work, rest during breaks if you can. If you don't get breaks, talk to your doctor about writing a letter to your employer to request them.  Avoid fumes, chemicals, and tobacco smoke.  Be sexual if you want to.     You may be interested in sex, or you may not. Everyone is different.  Sex is okay unless your doctor tells you not to.  Your belly can make it hard to find good positions for sex. [place] and explore.  Watch for signs of  labor.    These signs include:   Menstrual-like cramps. Or you may have pain or pressure in your pelvis that happens in a pattern.  About 6 or more contractions in an hour (even after rest and a glass of water).  A low, dull backache that doesn't go away when you change positions.  An increase or change in vaginal discharge.  Light vaginal bleeding or spotting.  Your water breaking.  Know what to do if you think you are having contractions.     Drink 1 or 2 glasses of water.  Lie down on your left side for at least an hour.  While on your side, feel the top of your belly to see if it's tight.  Write down your contractions for an hour. Time how long it is from the start of one contraction to the start of the next.  Call your doctor if you have regular contractions.  Follow-up care is a key part of your treatment and safety. Be sure to make and go to all appointments, and call your doctor if you are having problems. It's also a good idea to know your test results and keep a list of the medicines you take.  Where can you learn more?  Go to https://www.healthwise.net/patiented  Enter S999 in the search box to learn more about \"Weeks 26 to 30 of Your Pregnancy: Care Instructions.\"  Current as of: July " 10, 2023               Content Version: 14.0    0166-8030 [employer].   Care instructions adapted under license by your healthcare professional. If you have questions about a medical condition or this instruction, always ask your healthcare professional. [employer] disclaims any warranty or liability for your use of this information.      Counting Your Baby's Kicks: Care Instructions  Overview     Counting your baby's kicks is one way your doctor can tell that your baby is healthy. You will probably feel your baby move for the first time between 16 and 22 weeks. The movement may feel like flutters rather than kicks. Your baby may move more at certain times of the day. When you are active, you may notice less kicking than when you are resting. At your prenatal visits, your doctor will ask whether the baby is active.  In your last trimester, your doctor may ask you to count the number of times you feel your baby move.  Follow-up care is a key part of your treatment and safety. Be sure to make and go to all appointments, and call your doctor if you are having problems. It's also a good idea to know your test results and keep a list of the medicines you take.  How do you count fetal kicks?  A common method of checking your baby's movement is to note the length of time it takes to count 10 movements (such as kicks, flutters, or rolls).  Pick your baby's most active time of day to count. This may be any time from morning to evening.  If you don't feel 10 movements in an hour, have something to eat or drink and count for another hour. If you don't feel at least 10 movements in the 2-hour period, call your doctor.  Do not use an at-home Doppler heart monitor in place of counting fetal movements.  When should you call for help?   Call your doctor now or seek immediate medical care if:    You feel fewer than 10 movements in a 2-hour period.     You noticed that your baby has stopped moving or is  "moving less than normal.   Watch closely for changes in your health, and be sure to contact your doctor if you have any problems.  Where can you learn more?  Go to https://www.[employer].net/patiented  Enter U048 in the search box to learn more about \"Counting Your Baby's Kicks: Care Instructions.\"  Current as of: July 10, 2023               Content Version: 14.0    1796-1038 [employer].   Care instructions adapted under license by your healthcare professional. If you have questions about a medical condition or this instruction, always ask your healthcare professional. [employer] disclaims any warranty or liability for your use of this information.        "

## 2024-03-21 NOTE — PROGRESS NOTES
Donna is here for her 26w3d PNV and GCT testing.  Doing well with positive fetal movements.  Discussed sleeping positions and avoiding back with a hip pillow wedge.  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  Frequently charge so less patient movement but discussed use of pelvic belt prn.  ASSESSMENT: 26w3d   PLAN: RTC in 4 wks.

## 2024-04-11 NOTE — PATIENT INSTRUCTIONS
Weeks 26 to 30 of Your Pregnancy: Care Instructions  You're starting your last trimester. You'll probably feel your baby moving around more. Your back may ache as your body gets used to your baby's size and length. Take care of yourself, and pay attention to what your body needs.    Talk to your doctor about getting the Tdap shot. It will help protect your  against whooping cough (pertussis). Also ask your doctor about flu and COVID-19 shots if you haven't had them yet. If your blood type is Rh negative, you may be given a shot of Rh immune globulin (such as RhoGAM). It can help prevent problems for your baby.    You may have Nino-Edwards contractions. They are single or several strong contractions without a pattern. These are practice contractions but not the start of labor.  Be kind to yourself.     Take breaks when you're tired.  Change positions often. Don't sit for too long or stand for too long.  At work, rest during breaks if you can. If you don't get breaks, talk to your doctor about writing a letter to your employer to request them.  Avoid fumes, chemicals, and tobacco smoke.  Be sexual if you want to.     You may be interested in sex, or you may not. Everyone is different.  Sex is okay unless your doctor tells you not to.  Your belly can make it hard to find good positions for sex. Clark and explore.  Watch for signs of  labor.    These signs include:   Menstrual-like cramps. Or you may have pain or pressure in your pelvis that happens in a pattern.  About 6 or more contractions in an hour (even after rest and a glass of water).  A low, dull backache that doesn't go away when you change positions.  An increase or change in vaginal discharge.  Light vaginal bleeding or spotting.  Your water breaking.  Know what to do if you think you are having contractions.     Drink 1 or 2 glasses of water.  Lie down on your left side for at least an hour.  While on your side, feel the top of your  "belly to see if it's tight.  Write down your contractions for an hour. Time how long it is from the start of one contraction to the start of the next.  Call your doctor if you have regular contractions.  Follow-up care is a key part of your treatment and safety. Be sure to make and go to all appointments, and call your doctor if you are having problems. It's also a good idea to know your test results and keep a list of the medicines you take.  Where can you learn more?  Go to https://www."[employer].net/patiented  Enter S999 in the search box to learn more about \"Weeks 26 to 30 of Your Pregnancy: Care Instructions.\"  Current as of: July 10, 2023               Content Version: 14.0    6385-2153 [employer].   Care instructions adapted under license by your healthcare professional. If you have questions about a medical condition or this instruction, always ask your healthcare professional. [employer] disclaims any warranty or liability for your use of this information.      Weeks 30 to 32 of Your Pregnancy: Care Instructions  Your baby is growing more every day. Its eyes can open and close, and it may have hair on its head. Your baby may sleep 20 to 45 minutes at a time and is more active at certain times.    You should feel your baby move several times every day. Your baby now turns less and kicks more.    This is a good time to tour your hospital or birthing center. You may also want to find childcare if needed.    To ease heartburn    Avoid foods that make your symptoms worse, such as chocolate, spicy foods, and caffeine.  Avoid bending over or lying down after meals.  Do not eat for 2 hours before bedtime.  Take antacids like Tums, but don't take ones that have sodium bicarbonate, magnesium trisilicate, or aspirin.    To care for large, swollen veins (varicose veins)    Try to avoid standing for long periods of time.  Sit with your feet propped up.  Wear support hose.  Get some exercise " "every day, like walking or swimming.  Counting your baby's kicks  Your doctor may ask you to count your baby's movements, such as kicks, flutters, or rolls.    Find a quiet place, and get comfortable. Write down your start time. Count your baby's movements (except hiccups). When your baby has moved 10 times, you can stop counting. Write down how many minutes it took.    If an hour goes by and you don't feel 10 movements, have something to eat or drink. Count for another hour. If you don't feel at least 10 movements in the 2-hour period, call your doctor.  Follow-up care is a key part of your treatment and safety. Be sure to make and go to all appointments, and call your doctor if you are having problems. It's also a good idea to know your test results and keep a list of the medicines you take.  Where can you learn more?  Go to https://[email or web address].Kopi/patiented  Enter X471 in the search box to learn more about \"Weeks 30 to 32 of Your Pregnancy: Care Instructions.\"  Current as of: July 10, 2023               Content Version: 14.0    1531-9158 [employer]   Care instructions adapted under license by your healthcare professional. If you have questions about a medical condition or this instruction, always ask your healthcare professional. [employer] disclaims any warranty or liability for your use of this information.      Learning About Birth Control After Childbirth  What is birth control?  Birth control is any method used to prevent pregnancy. If you have vaginal sex without birth control, you could get pregnant--even if you haven't started having periods again. You're less likely to get pregnant while breastfeeding, but it's still possible. Finding birth control that works for you can help avoid an unplanned pregnancy.  There are many kinds of birth control. Each has pros and cons. Find what works for you. Talk to your doctor if you've just given birth or are " "breastfeeding.    Long-acting reversible contraception (LARC). These are placed inside your body by a doctor. They can prevent pregnancy for years.  Examples include:  An implant (hormonal).  Copper intrauterine device (IUD).  Hormonal IUDs.    Short-acting hormonal methods. These release hormones. Examples include:  Combination birth control pills (\"the pill\").  Skin patches.  A vaginal ring.  A shot.  Mini-pills. Choose progestin-only options soon after giving birth.    Barrier methods. Use these every time you have vaginal sex.  Examples include:  External (male) condoms.  Internal (female) condoms.  Diaphragms.  Cervical caps.  Sponges.    Spermicides. These kill sperm or stop sperm from moving. They can be gels, creams, foams, films, or tablets. Use them before vaginal sex.  Examples include:  Nonoxynol-9.  pH regulator gel.    Permanent birth control (sterilization). This can be an option if you're sure that you don't want to get pregnant later.  Examples include:  Vasectomy.  Having tubes tied (tubal ligation).    Emergency contraception. This is a backup method. Use it if you didn't use birth control or your birth control method failed.  Examples include:  Copper and hormonal IUDs.  Emergency contraceptive pills.    Fertility awareness. You'll learn when you are most likely to become pregnant (are fertile). You can avoid vaginal sex at that time.  It's also called:  Natural family planning.  The rhythm method.    Breastfeeding. This is most effective when all of these are true:  Your baby is younger than 6 months old.  You're breastfeeding and not bottle-feeding at all.  You aren't having periods.  Follow-up care is a key part of your treatment and safety. Be sure to make and go to all appointments, and call your doctor if you are having problems. It's also a good idea to know your test results and keep a list of the medicines you take.  Where can you learn more?  Go to " "https://www.healthDayima.net/patiented  Enter X408 in the search box to learn more about \"Learning About Birth Control After Childbirth.\"  Current as of: July 10, 2023               Content Version: 14.0    0389-5180 [employer].   Care instructions adapted under license by your healthcare professional. If you have questions about a medical condition or this instruction, always ask your healthcare professional. [employer] disclaims any warranty or liability for your use of this information.      "

## 2024-04-15 ENCOUNTER — PRENATAL OFFICE VISIT (OUTPATIENT)
Dept: MIDWIFE SERVICES | Facility: CLINIC | Age: 39
End: 2024-04-15
Payer: COMMERCIAL

## 2024-04-15 VITALS
OXYGEN SATURATION: 99 % | BODY MASS INDEX: 32.4 KG/M2 | SYSTOLIC BLOOD PRESSURE: 120 MMHG | DIASTOLIC BLOOD PRESSURE: 79 MMHG | HEART RATE: 82 BPM | WEIGHT: 193.2 LBS

## 2024-04-15 DIAGNOSIS — O09.513 PRIMIGRAVIDA OF ADVANCED MATERNAL AGE IN THIRD TRIMESTER: Primary | ICD-10-CM

## 2024-04-15 PROCEDURE — 90471 IMMUNIZATION ADMIN: CPT | Performed by: ADVANCED PRACTICE MIDWIFE

## 2024-04-15 PROCEDURE — 99207 PR PRENATAL VISIT: CPT | Performed by: ADVANCED PRACTICE MIDWIFE

## 2024-04-15 PROCEDURE — 90715 TDAP VACCINE 7 YRS/> IM: CPT | Performed by: ADVANCED PRACTICE MIDWIFE

## 2024-04-15 NOTE — PROGRESS NOTES
30w0d  Patient is feeling well. Positive fetal movement and lian luke. TDAP today. Donna and Silvino recently went to [facility] class to learn partner support during labor. Reassured that nursing and midwives will be able to provide guidance during labor.  Danger signs reviewed, pre-eclampsia signs and symptoms discussed, continues to take ASA daily. Discussed when to call triage, confirmed she has phone number.      RTC 2 weeks.  NORRIS Breen    I was present with the TONNY student who participated in the service and in the documentation of the services provided. I have verified the history and personally performed the physical exam and medical decision making, as documented by the student and edited by me.     EDUARDA Bartholomew CNM

## 2024-05-02 ENCOUNTER — PRENATAL OFFICE VISIT (OUTPATIENT)
Dept: MIDWIFE SERVICES | Facility: CLINIC | Age: 39
End: 2024-05-02
Payer: COMMERCIAL

## 2024-05-02 VITALS
HEART RATE: 87 BPM | DIASTOLIC BLOOD PRESSURE: 72 MMHG | SYSTOLIC BLOOD PRESSURE: 123 MMHG | OXYGEN SATURATION: 100 % | TEMPERATURE: 97.9 F | WEIGHT: 199.4 LBS | BODY MASS INDEX: 33.44 KG/M2

## 2024-05-02 DIAGNOSIS — Z34.83 ENCOUNTER FOR SUPERVISION OF OTHER NORMAL PREGNANCY IN THIRD TRIMESTER: Primary | ICD-10-CM

## 2024-05-02 PROCEDURE — 99207 PR PRENATAL VISIT: CPT | Performed by: ADVANCED PRACTICE MIDWIFE

## 2024-05-02 NOTE — PATIENT INSTRUCTIONS
"Weeks 32 to 34 of Your Pregnancy: Care Instructions    Decide whether you want to bank or donate your baby's umbilical cord blood. If you want to save this blood, you have to arrange for it ahead of time.    Decide about circumcision. Personal, Gnosticism, or cultural beliefs may play a role in your decision. You get to decide what you want for your baby.    Learn how to ease hemorrhoids.    Get more liquids, fruits, vegetables, and fiber in your diet.  Avoid sitting for too long.  Clean yourself with moist toilet paper. Or try witch hazel pads.  Try ice packs or warm sitz baths for discomfort.  Use hydrocortisone cream for pain or itching.  Ask your doctor about stool softeners.    Consider the benefits of breastfeeding.    It reduces your baby's risk of sudden infant death syndrome (SIDS).   babies are less likely to get certain infections. And they're less likely to be obese or get diabetes later in life.  It can lower your risk of breast and ovarian cancers and osteoporosis.  It saves you money.  Follow-up care is a key part of your treatment and safety. Be sure to make and go to all appointments, and call your doctor if you are having problems. It's also a good idea to know your test results and keep a list of the medicines you take.  Where can you learn more?  Go to https://www.[employer].net/patiented  Enter X711 in the search box to learn more about \"Weeks 32 to 34 of Your Pregnancy: Care Instructions.\"  Current as of: July 10, 2023               Content Version: 14.0    1103-5535 [employer].   Care instructions adapted under license by your healthcare professional. If you have questions about a medical condition or this instruction, always ask your healthcare professional. [employer] disclaims any warranty or liability for your use of this information.      You have been provided the Any Day Now: Early Labor at Home document.    Additional copies can be found here:  " [email or web address]  You have been provided the What I'd Wish I'd Known About Giving Birth document.    Additional copies can be found here:  [email or web address]  Weeks 34 to 36 of Your Pregnancy: Care Instructions  Your belly has grown quite large. It's almost time to give birth! Your baby's lungs are almost ready to breathe air. The skull bones are firm enough to protect your baby's head. But they're soft enough to move down through the birth canal.    You might be wondering what to expect during labor. Because each birth is different, there's no way to know exactly what childbirth will be like for you. Talk to your doctor or midwife about any concerns you have.    You'll probably have a test for group B streptococcus (GBS). GBS is bacteria that can live in the vagina and rectum. GBS can make your baby sick after birth. If you test positive, you'll get antibiotics during labor.    Choose what type of pain relief you would like during labor.  You can choose from a few types, including medicine and non-medicine options. You may want to use several types of pain relief.     Know how medicines can help with pain during labor.  Some medicines lower anxiety and help with some of the pain. Others make your lower body numb so that you will feel less pain.     Tell your doctor about your pain medicine choice.  Do this before you start labor or very early in your labor. You may be able to change your mind during labor.     Learn about the stages of labor.    The first stage includes the early (latent) and active phases of labor. Contractions start in early labor. During active labor, contractions get stronger, last longer, and happen more often. And the cervix opens more rapidly.  The second stage starts when you're ready to push. During this stage, your baby is born.  During the third stage, you'll usually have a few more contractions to push out the placenta.   Follow-up care is a key part of your treatment  "and safety. Be sure to make and go to all appointments, and call your doctor if you are having problems. It's also a good idea to know your test results and keep a list of the medicines you take.  Where can you learn more?  Go to https://www.[employer].net/patiented  Enter B912 in the search box to learn more about \"Weeks 34 to 36 of Your Pregnancy: Care Instructions.\"  Current as of: July 10, 2023               Content Version: 14.0    3281-2969 [employer].   Care instructions adapted under license by your healthcare professional. If you have questions about a medical condition or this instruction, always ask your healthcare professional. [employer] disclaims any warranty or liability for your use of this information.      Group B Strep During Pregnancy: Care Instructions  Overview     Group B strep infection is caused by a type of bacteria. It's a different kind of bacteria than the kind that causes strep throat.  You may have this kind of bacteria in your body. Sometimes it may cause an infection, but most of the time it doesn't make you sick or cause symptoms. But if you pass the bacteria to your baby during the birth, it can cause serious health problems for your baby.  If you have this bacteria in your body, you will get antibiotics when you are in labor. Antibiotics help prevent problems for a  baby.  After birth, doctors will watch and may test your baby. If your baby tests positive for Group B strep, your baby will get antibiotics.  If you plan to breastfeed your baby, don't worry. It will be safe to breastfeed.  Follow-up care is a key part of your treatment and safety. Be sure to make and go to all appointments, and call your doctor if you are having problems. It's also a good idea to know your test results and keep a list of the medicines you take.  How can you care for yourself at home?  If your doctor has prescribed antibiotics, take them as directed. Do not stop " "taking them just because you feel better. You need to take the full course of antibiotics.  Tell your doctor if you are allergic to any antibiotic.  If you go into labor, or your water breaks, go to the hospital. Your doctor will give you antibiotics to help protect your baby from infection.  Tell the doctors and nurses if you have an allergy to penicillin.  Tell the doctors and nurses at the hospital that you tested positive for group B strep.  When should you call for help?   Call your doctor now or seek immediate medical care if:    You have symptoms of a urinary tract infection. These may include:  Pain or burning when you urinate.  A frequent need to urinate without being able to pass much urine.  Pain in the flank, which is just below the rib cage and above the waist on either side of the back.  Blood in your urine.  A fever.     You think you are in labor or your water has broken.     You have pain in your belly or pelvis.   Watch closely for changes in your health, and be sure to contact your doctor if you have any problems.  Where can you learn more?  Go to https://www.[employer].Xamplified/patiented  Enter M001 in the search box to learn more about \"Group B Strep During Pregnancy: Care Instructions.\"  Current as of: June 12, 2023               Content Version: 14.0    7105-2770 [employer].   Care instructions adapted under license by your healthcare professional. If you have questions about a medical condition or this instruction, always ask your healthcare professional. [employer] disclaims any warranty or liability for your use of this information.      Circumcision in Infants: What to Expect at Home  Your Child's Recovery  After circumcision, your baby's penis may look red and swollen. It may have petroleum jelly and gauze on it. The gauze will likely come off when your baby urinates. Follow your doctor's directions about whether to put clean gauze back on your baby's penis or to " leave the gauze off. If you need to remove gauze from the penis, use warm water to soak the gauze and gently loosen it.  The doctor may have used a Plastibell device to do the circumcision. If so, your baby will have a plastic ring around the head of the penis. The ring should fall off by itself in 10 to 12 days.  A thin, yellow film may form over the area the day after the procedure. This is part of the normal healing process. It should go away in a few days.  Your baby may seem fussy while the area heals. It may hurt for your baby to urinate. This pain often gets better in 3 or 4 days. But it may last for up to 2 weeks.  Even though your baby's penis will likely start to feel better after 3 or 4 days, it may look worse. The penis often starts to look like it's getting better after about 7 to 10 days.  This care sheet gives you a general idea about how long it will take for your child to recover. But each child recovers at a different pace. Follow the steps below to help your child get better as quickly as possible.  How can you care for your child at home?  Activity    Let your baby rest as much as possible. Sleeping will help with recovery.     You can give your baby a sponge bath the day after surgery. Ask your doctor when it is okay to give your baby a bath.   Medicines    Your doctor will tell you if and when your child can restart any medicines. The doctor will also give you instructions about your child taking any new medicines.     Your doctor may recommend giving your baby acetaminophen (Tylenol) to help with pain after the procedure. Be safe with medicines. Give your child medicines exactly as prescribed. Call your doctor if you think your child is having a problem with a medicine.     Do not give your child two or more pain medicines at the same time unless the doctor told you to. Many pain medicines have acetaminophen, which is Tylenol. Too much acetaminophen (Tylenol) can be harmful.   Circumcision  "care    Always wash your hands before and after touching the circumcision area.     Gently wash your baby's penis with plain, warm water after each diaper change, and pat it dry. Do not use soap. Don't use hydrogen peroxide or alcohol. They can slow healing.     Do not try to remove the film that forms on the penis. The film will go away on its own.     Put plenty of petroleum jelly (such as Vaseline) on the circumcision area during each diaper change. This will prevent your baby's penis from sticking to the diaper while it heals.     Fasten your baby's diapers loosely so that there is less pressure on the penis while it heals.   Follow-up care is a key part of your child's treatment and safety. Be sure to make and go to all appointments, and call your doctor if your child is having problems. It's also a good idea to know your child's test results and keep a list of the medicines your child takes.  When should you call for help?   Call your doctor now or seek immediate medical care if:    Your baby has a fever over 100.4 F.     Your baby is extremely fussy or irritable, has a high-pitched cry, or refuses to eat.     Your baby does not have a wet diaper within 12 hours after the circumcision.     You find a spot of bleeding larger than a 2-inch [place] from the incision.     Your baby has signs of infection. Signs may include severe swelling; redness; a red streak on the shaft of the penis; or a thick, yellow discharge.   Watch closely for changes in your child's health, and be sure to contact your doctor if:    A Plastibell device was used for the circumcision and the ring has not fallen off after 10 to 12 days.   Where can you learn more?  Go to https://www.Mindscape.net/patiented  Enter S255 in the search box to learn more about \"Circumcision in Infants: What to Expect at Home.\"  Current as of: October 24, 2023               Content Version: 14.0    7582-9098 [employer].   Care instructions adapted " under license by your healthcare professional. If you have questions about a medical condition or this instruction, always ask your healthcare professional. [employer] Inc[employer] disclaims any warranty or liability for your use of this information.

## 2024-05-02 NOTE — PROGRESS NOTES
32w3d   Feeling well, no concerns.  Reviewed end of pregnancy visits, GBS hgb and Brief BSUS @ 36 wks.  RTC 2 wks EDUARDA RamirezM

## 2024-05-17 ENCOUNTER — PRENATAL OFFICE VISIT (OUTPATIENT)
Dept: MIDWIFE SERVICES | Facility: CLINIC | Age: 39
End: 2024-05-17
Payer: COMMERCIAL

## 2024-05-17 VITALS
WEIGHT: 203.9 LBS | TEMPERATURE: 96.3 F | DIASTOLIC BLOOD PRESSURE: 79 MMHG | BODY MASS INDEX: 34.19 KG/M2 | SYSTOLIC BLOOD PRESSURE: 119 MMHG | HEART RATE: 94 BPM | OXYGEN SATURATION: 100 %

## 2024-05-17 DIAGNOSIS — Z34.03 ENCOUNTER FOR SUPERVISION OF NORMAL FIRST PREGNANCY IN THIRD TRIMESTER: Primary | ICD-10-CM

## 2024-05-17 PROCEDURE — 99207 PR PRENATAL VISIT: CPT | Performed by: ADVANCED PRACTICE MIDWIFE

## 2024-05-17 NOTE — PROGRESS NOTES
34w4d  Donna is here with Silvino. 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 Plan to discuss at her next visit, but noted that we can send a note via [employer] if needed. Baby is active, no bleeding, leaking of fluid, headache, vision changes, RUQ pain or new edema. Discussed GBS, hgb and BSUS at next visit in 2 weeks.  Montez Henriquez CNM

## 2024-05-17 NOTE — PATIENT INSTRUCTIONS
Weeks 34 to 36 of Your Pregnancy: Care Instructions  Your belly has grown quite large. It's almost time to give birth! Your baby's lungs are almost ready to breathe air. The skull bones are firm enough to protect your baby's head. But they're soft enough to move down through the birth canal.    You might be wondering what to expect during labor. Because each birth is different, there's no way to know exactly what childbirth will be like for you. Talk to your doctor or midwife about any concerns you have.    You'll probably have a test for group B streptococcus (GBS). GBS is bacteria that can live in the vagina and rectum. GBS can make your baby sick after birth. If you test positive, you'll get antibiotics during labor.    Choose what type of pain relief you would like during labor.  You can choose from a few types, including medicine and non-medicine options. You may want to use several types of pain relief.     Know how medicines can help with pain during labor.  Some medicines lower anxiety and help with some of the pain. Others make your lower body numb so that you will feel less pain.     Tell your doctor about your pain medicine choice.  Do this before you start labor or very early in your labor. You may be able to change your mind during labor.     Learn about the stages of labor.    The first stage includes the early (latent) and active phases of labor. Contractions start in early labor. During active labor, contractions get stronger, last longer, and happen more often. And the cervix opens more rapidly.  The second stage starts when you're ready to push. During this stage, your baby is born.  During the third stage, you'll usually have a few more contractions to push out the placenta.   Follow-up care is a key part of your treatment and safety. Be sure to make and go to all appointments, and call your doctor if you are having problems. It's also a good idea to know your test results and keep a list of the  "medicines you take.  Where can you learn more?  Go to https://www.[employer].net/patiented  Enter B912 in the search box to learn more about \"Weeks 34 to 36 of Your Pregnancy: Care Instructions.\"  Current as of: July 10, 2023               Content Version: 14.0    9112-8121 [employer].   Care instructions adapted under license by your healthcare professional. If you have questions about a medical condition or this instruction, always ask your healthcare professional. [employer] disclaims any warranty or liability for your use of this information.      Group B Strep During Pregnancy: Care Instructions  Overview     Group B strep infection is caused by a type of bacteria. It's a different kind of bacteria than the kind that causes strep throat.  You may have this kind of bacteria in your body. Sometimes it may cause an infection, but most of the time it doesn't make you sick or cause symptoms. But if you pass the bacteria to your baby during the birth, it can cause serious health problems for your baby.  If you have this bacteria in your body, you will get antibiotics when you are in labor. Antibiotics help prevent problems for a  baby.  After birth, doctors will watch and may test your baby. If your baby tests positive for Group B strep, your baby will get antibiotics.  If you plan to breastfeed your baby, don't worry. It will be safe to breastfeed.  Follow-up care is a key part of your treatment and safety. Be sure to make and go to all appointments, and call your doctor if you are having problems. It's also a good idea to know your test results and keep a list of the medicines you take.  How can you care for yourself at home?  If your doctor has prescribed antibiotics, take them as directed. Do not stop taking them just because you feel better. You need to take the full course of antibiotics.  Tell your doctor if you are allergic to any antibiotic.  If you go into labor, or your " "water breaks, go to the hospital. Your doctor will give you antibiotics to help protect your baby from infection.  Tell the doctors and nurses if you have an allergy to penicillin.  Tell the doctors and nurses at the hospital that you tested positive for group B strep.  When should you call for help?   Call your doctor now or seek immediate medical care if:    You have symptoms of a urinary tract infection. These may include:  Pain or burning when you urinate.  A frequent need to urinate without being able to pass much urine.  Pain in the flank, which is just below the rib cage and above the waist on either side of the back.  Blood in your urine.  A fever.     You think you are in labor or your water has broken.     You have pain in your belly or pelvis.   Watch closely for changes in your health, and be sure to contact your doctor if you have any problems.  Where can you learn more?  Go to https://www.[employer]/patiented  Enter M001 in the search box to learn more about \"Group B Strep During Pregnancy: Care Instructions.\"  Current as of: June 12, 2023               Content Version: 14.0    4167-0692 [employer].   Care instructions adapted under license by your healthcare professional. If you have questions about a medical condition or this instruction, always ask your healthcare professional. [employer] disclaims any warranty or liability for your use of this information.      Circumcision in Infants: What to Expect at Home  Your Child's Recovery  After circumcision, your baby's penis may look red and swollen. It may have petroleum jelly and gauze on it. The gauze will likely come off when your baby urinates. Follow your doctor's directions about whether to put clean gauze back on your baby's penis or to leave the gauze off. If you need to remove gauze from the penis, use warm water to soak the gauze and gently loosen it.  The doctor may have used a Plastibell device to do the " circumcision. If so, your baby will have a plastic ring around the head of the penis. The ring should fall off by itself in 10 to 12 days.  A thin, yellow film may form over the area the day after the procedure. This is part of the normal healing process. It should go away in a few days.  Your baby may seem fussy while the area heals. It may hurt for your baby to urinate. This pain often gets better in 3 or 4 days. But it may last for up to 2 weeks.  Even though your baby's penis will likely start to feel better after 3 or 4 days, it may look worse. The penis often starts to look like it's getting better after about 7 to 10 days.  This care sheet gives you a general idea about how long it will take for your child to recover. But each child recovers at a different pace. Follow the steps below to help your child get better as quickly as possible.  How can you care for your child at home?  Activity    Let your baby rest as much as possible. Sleeping will help with recovery.     You can give your baby a sponge bath the day after surgery. Ask your doctor when it is okay to give your baby a bath.   Medicines    Your doctor will tell you if and when your child can restart any medicines. The doctor will also give you instructions about your child taking any new medicines.     Your doctor may recommend giving your baby acetaminophen (Tylenol) to help with pain after the procedure. Be safe with medicines. Give your child medicines exactly as prescribed. Call your doctor if you think your child is having a problem with a medicine.     Do not give your child two or more pain medicines at the same time unless the doctor told you to. Many pain medicines have acetaminophen, which is Tylenol. Too much acetaminophen (Tylenol) can be harmful.   Circumcision care    Always wash your hands before and after touching the circumcision area.     Gently wash your baby's penis with plain, warm water after each diaper change, and pat it dry.  "Do not use soap. Don't use hydrogen peroxide or alcohol. They can slow healing.     Do not try to remove the film that forms on the penis. The film will go away on its own.     Put plenty of petroleum jelly (such as Vaseline) on the circumcision area during each diaper change. This will prevent your baby's penis from sticking to the diaper while it heals.     Fasten your baby's diapers loosely so that there is less pressure on the penis while it heals.   Follow-up care is a key part of your child's treatment and safety. Be sure to make and go to all appointments, and call your doctor if your child is having problems. It's also a good idea to know your child's test results and keep a list of the medicines your child takes.  When should you call for help?   Call your doctor now or seek immediate medical care if:    Your baby has a fever over 100.4 F.     Your baby is extremely fussy or irritable, has a high-pitched cry, or refuses to eat.     Your baby does not have a wet diaper within 12 hours after the circumcision.     You find a spot of bleeding larger than a 2-inch [place] from the incision.     Your baby has signs of infection. Signs may include severe swelling; redness; a red streak on the shaft of the penis; or a thick, yellow discharge.   Watch closely for changes in your child's health, and be sure to contact your doctor if:    A Plastibell device was used for the circumcision and the ring has not fallen off after 10 to 12 days.   Where can you learn more?  Go to https://www.[employer].net/patiented  Enter S255 in the search box to learn more about \"Circumcision in Infants: What to Expect at Home.\"  Current as of: October 24, 2023               Content Version: 14.0    7446-7391 [employer].   Care instructions adapted under license by your healthcare professional. If you have questions about a medical condition or this instruction, always ask your healthcare professional. [employer], " "[facility] disclaims any warranty or liability for your use of this information.      Week 37 of Your Pregnancy: Care Instructions    Most babies are born between 37 and 40 weeks.    This is a good time to pack a bag to take with you to the birth. Then it will be ready to go when you are.    Learn about breastfeeding.  For example, find out about ways to hold your baby to make breastfeeding easier. And think about learning how to pump and store milk.     Know that crying is normal.  It's common for babies to cry 1 to 3 hours a day. Some cry more, and some cry less.     Learn why babies cry.  They may be hungry; have gas; need a diaper change; or feel cold, warm, tired, lonely, or tense. Sometimes they cry for unknown reasons.     Think about what will help you stay calm when your baby cries.  Taking slow, deep breaths can help. So can taking a break. It's okay to put your baby somewhere safe (like their crib) and walk away for a few minutes.     Learn about safe sleep for your baby.  Always put your baby to sleep on their back. Place them alone in a crib or bassinet with a firm, flat surface. Keep soft items like stuffed animals out of the crib.     Learn what to expect with  poop.  Your baby will have their own bowel patterns. Some babies have several bowel movements a day. Some have fewer.     Know that  babies will often have loose, yellow bowel movements.  Formula-fed babies have more formed stools. If your baby's poop looks like pellets, your baby is constipated.   Follow-up care is a key part of your treatment and safety. Be sure to make and go to all appointments, and call your doctor if you are having problems. It's also a good idea to know your test results and keep a list of the medicines you take.  Where can you learn more?  Go to https://www.healthwise.net/patiented  Enter N257 in the search box to learn more about \"Week 37 of Your Pregnancy: Care Instructions.\"  Current as of: July 10, " 2023               Content Version: 14.0    2083-3087 [employer].   Care instructions adapted under license by your healthcare professional. If you have questions about a medical condition or this instruction, always ask your healthcare professional. [employer] disclaims any warranty or liability for your use of this information.

## 2024-05-30 ENCOUNTER — PRENATAL OFFICE VISIT (OUTPATIENT)
Dept: MIDWIFE SERVICES | Facility: CLINIC | Age: 39
End: 2024-05-30
Payer: COMMERCIAL

## 2024-05-30 VITALS
BODY MASS INDEX: 34.8 KG/M2 | HEART RATE: 90 BPM | OXYGEN SATURATION: 98 % | SYSTOLIC BLOOD PRESSURE: 134 MMHG | DIASTOLIC BLOOD PRESSURE: 83 MMHG | TEMPERATURE: 98 F | RESPIRATION RATE: 20 BRPM | WEIGHT: 207.5 LBS

## 2024-05-30 DIAGNOSIS — Z34.03 ENCOUNTER FOR SUPERVISION OF NORMAL FIRST PREGNANCY IN THIRD TRIMESTER: Primary | ICD-10-CM

## 2024-05-30 LAB
HGB BLD-MCNC: 12.6 G/DL (ref 11.7–15.7)
HOLD SPECIMEN: NORMAL

## 2024-05-30 PROCEDURE — 99207 PR PRENATAL VISIT: CPT | Performed by: ADVANCED PRACTICE MIDWIFE

## 2024-05-30 PROCEDURE — 36415 COLL VENOUS BLD VENIPUNCTURE: CPT | Performed by: ADVANCED PRACTICE MIDWIFE

## 2024-05-30 PROCEDURE — 87653 STREP B DNA AMP PROBE: CPT | Performed by: ADVANCED PRACTICE MIDWIFE

## 2024-05-30 NOTE — PROGRESS NOTES
36w3d  Feeling well, shifts are getting harder at work. 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 Considering starting light duty the following week. Will notify team if she would like a letter, next work shift after this weekend will be Thursday. Baby is active. No regular contractions, LOF or bleeding. Denies headaches, visual changes.   GBS, hgb and BSUS today confirms cephalic position.   Has phone numbers and knows where to go.  RTC weekly. MML

## 2024-06-01 LAB — GP B STREP DNA SPEC QL NAA+PROBE: NEGATIVE

## 2024-06-02 ENCOUNTER — MYC MEDICAL ADVICE (OUTPATIENT)
Dept: MIDWIFE SERVICES | Facility: CLINIC | Age: 39
End: 2024-06-02
Payer: COMMERCIAL

## 2024-06-03 NOTE — TELEPHONE ENCOUNTER
PAPERWORK REQUEST    Form received on: 06/03/24   How was form received: Brian  Preferred Provider: Debbie Josue CNM  Type of form: Restrictions/Workability  Date needed by: 06/07/24   What to do with form once completed: Fax to [phone]  Where was form placed?: in Debbie's basket for completion and signature  Has an HEATHER been signed? Not Applicable    Additional Notes: sticky note left on form indicating the need to fill in restrictions for light duty.       Michelle Hooker/her/hers  [facility]

## 2024-06-05 ENCOUNTER — HOSPITAL ENCOUNTER (OUTPATIENT)
Facility: CLINIC | Age: 39
End: 2024-06-05
Payer: COMMERCIAL

## 2024-06-05 ENCOUNTER — TELEPHONE (OUTPATIENT)
Dept: MIDWIFE SERVICES | Facility: CLINIC | Age: 39
End: 2024-06-05
Payer: COMMERCIAL

## 2024-06-05 NOTE — TELEPHONE ENCOUNTER
Form placed in on call midwife basket, will try to finish today. We typically ask for 5-7 business days to complete

## 2024-06-05 NOTE — TELEPHONE ENCOUNTER
[facility] Call Center    Phone Message    May a detailed message be left on voicemail: yes     Reason for Call: Form or Letter   Type or form/letter needing completion: Restrictions/Workability  Provider: Debbie Rendon  Date form needed: today, 6/5/24  Once completed: Fax form to: [phone]    Action Taken: Other: rd midwife     Travel Screening: Not Applicable     Date of Service:                        Pt states she put in the request for this form on Monday per her conversation with her midwife, but she needs that form completed today for her employer. She states anyone on her care team can fill it out. Please advise.

## 2024-06-07 ENCOUNTER — PRENATAL OFFICE VISIT (OUTPATIENT)
Dept: MIDWIFE SERVICES | Facility: CLINIC | Age: 39
End: 2024-06-07
Payer: COMMERCIAL

## 2024-06-07 VITALS
WEIGHT: 208 LBS | BODY MASS INDEX: 34.88 KG/M2 | HEART RATE: 99 BPM | SYSTOLIC BLOOD PRESSURE: 130 MMHG | OXYGEN SATURATION: 98 % | DIASTOLIC BLOOD PRESSURE: 82 MMHG

## 2024-06-07 DIAGNOSIS — Z34.03 ENCOUNTER FOR SUPERVISION OF NORMAL FIRST PREGNANCY IN THIRD TRIMESTER: Primary | ICD-10-CM

## 2024-06-07 PROCEDURE — 99207 PR PRENATAL VISIT: CPT | Performed by: ADVANCED PRACTICE MIDWIFE

## 2024-06-07 NOTE — PROGRESS NOTES
37w4d  Donna is here with Silvino. Feeling well. Reports good fetal movement. Denies leaking of fluid, vaginal bleeding, regular uterine contractions, headache, visual changes, or other concerns. Planning to be open minded with birth plan. Reviewed labor s/sx today and well to call. RTC weekly.    EDUARDA Bartholomew CNM

## 2024-06-13 ENCOUNTER — PRENATAL OFFICE VISIT (OUTPATIENT)
Dept: MIDWIFE SERVICES | Facility: CLINIC | Age: 39
End: 2024-06-13
Payer: COMMERCIAL

## 2024-06-13 VITALS
DIASTOLIC BLOOD PRESSURE: 72 MMHG | WEIGHT: 208.7 LBS | OXYGEN SATURATION: 99 % | BODY MASS INDEX: 35 KG/M2 | SYSTOLIC BLOOD PRESSURE: 118 MMHG | HEART RATE: 87 BPM | TEMPERATURE: 97.8 F

## 2024-06-13 DIAGNOSIS — Z34.03 ENCOUNTER FOR SUPERVISION OF NORMAL FIRST PREGNANCY, THIRD TRIMESTER: Primary | ICD-10-CM

## 2024-06-13 PROCEDURE — 99207 PR PRENATAL VISIT: CPT | Performed by: ADVANCED PRACTICE MIDWIFE

## 2024-06-13 NOTE — PROGRESS NOTES
38w3d  Feeling well. Ready for baby. Denies signs of labor. Reports good fetal movement. Denies leaking of fluid, vaginal bleeding, regular uterine contractions, headache or other concerns.  RTC in 1 wk [facility]

## 2024-06-13 NOTE — PATIENT INSTRUCTIONS
Week 38 of Your Pregnancy: Care Instructions  Believe it or not, your baby is almost here. You may notice how your baby responds to sounds, warmth, cold, and light. You may even know what kind of music your baby likes.    Even if you expect a vaginal birth, it's a good idea to learn about  section ().  is the delivery of a baby through a cut (incision) in your belly. It's a major surgery, so it has more risks than a vaginal birth.    During the first 2 weeks after the birth, limit visitors. Don't allow visitors who have colds or infections. Ask visitors to wash their hands before they touch your baby. And never let anyone smoke around your baby.    Know about unplanned C-sections.  Reasons for an unplanned  include labor that slows or stops, signs of distress in your baby, and high blood pressure or other problems for you.     Know about planned C-sections.  If your baby isn't in a head-down position for delivery (breech position), your doctor may plan a . Or you may have a planned  if you're having twins or more.     Get as much help as you can while you're in the hospital.  You can learn about feeding, diapering, and bathing your baby.     Talk to your doctor or midwife about how to care for the umbilical cord stump.  If your baby will be circumcised, also ask about how to care for that.     Ask friends or family for help, as you need it.  If you can, have another adult in your home for at least 2 or 3 days after the birth.     Try to nap when your baby naps.  This may be your best chance to get some sleep.     Watch for changes in your mental health.  For the first 1 to 2 weeks after birth, it's common to cry or feel sad or irritable. If these feelings last for more than 2 weeks, you may have postpartum depression. Ask your doctor for help. Postpartum depression can be treated.   Follow-up care is a key part of your treatment and safety. Be sure to make and go  "to all appointments, and call your doctor if you are having problems. It's also a good idea to know your test results and keep a list of the medicines you take.  Where can you learn more?  Go to https://www.[employer].net/patiented  Enter B044 in the search box to learn more about \"Week 38 of Your Pregnancy: Care Instructions.\"  Current as of: July 10, 2023               Content Version: 14.0    8292-5655 [employer].   Care instructions adapted under license by your healthcare professional. If you have questions about a medical condition or this instruction, always ask your healthcare professional. [employer] disclaims any warranty or liability for your use of this information.      Week 39 of Your Pregnancy: Care Instructions    [place] babies can look different than what you see in pictures or movies. Their heads can be a strange shape right after birth. And they may have swollen eyes and red marks on their faces.    You can still get pregnant even if you are breastfeeding. If you don't want to get pregnant, talk to your doctor about birth control.  Tips for week 39 of pregnancy  If you plan to breastfeed, get prepared.     Continue to eat healthy foods.  Keep taking your prenatal vitamins during breastfeeding if your doctor recommends it.  Talk to your doctor before taking any medicines or supplements.  Choose the right birth control for you.     Intrauterine devices (IUDs) are placed in the uterus. Sometimes the IUD can be placed right after giving birth. They work for years.  Hormonal implants are placed under the skin of the arm. They also work for years.  Depo-Provera is a shot. You get it every 3 months.  Birth control pills can be used. They're taken every day.  Tubal ligation (tying your tubes) and vasectomy are surgeries. They're permanent.  Diaphragms, spermicide, and condoms must be used each time you have sex. If you used a diaphragm before, you should get refitted after the " "baby is born.  A birth control patch or ring can be used. These just can't be started until several weeks after you give birth.  Follow-up care is a key part of your treatment and safety. Be sure to make and go to all appointments, and call your doctor if you are having problems. It's also a good idea to know your test results and keep a list of the medicines you take.  Where can you learn more?  Go to https://www.[employer].net/patiented  Enter A811 in the search box to learn more about \"Week 39 of Your Pregnancy: Care Instructions.\"  Current as of: July 10, 2023               Content Version: 14.0    2942-0996 [employer].   Care instructions adapted under license by your healthcare professional. If you have questions about a medical condition or this instruction, always ask your healthcare professional. [employer] disclaims any warranty or liability for your use of this information.      Weeks 40 to 41 of Your Pregnancy: Care Instructions  By week 40, you've reached your due date. Your baby could be coming any day. Most babies born after their due dates are healthy. But you may have tests to make sure everything is okay. If you feel stressed, you could try a meditation exercise, such as guided imagery. It may help you relax.    If you are past 41 weeks, your doctor may measure the amount of fluid that surrounds your baby. They may also test your baby's movement and heart rate.    If you don't start labor on your own by 41 or 42 weeks, your doctor may want to start (induce) labor. If there are other concerns, your doctor may talk to you about a .  To start (induce) your labor, your doctor may do any of these things.    Place a narrow tube with a small balloon on the end (balloon catheter) into your cervix.  The doctor inflates the balloon. This helps your cervix open (dilate).     Sweep the membranes (separate the lining of the amniotic sac from the uterus).  This helps the " "uterus make a chemical that can start contractions.     \"Break your water\" (rupture the amniotic sac).  This may be done if your cervix has started to open.     Use medicines.  They may be used to soften the cervix or start contractions.   How to do guided imagery    Close your eyes, and take a few deep breaths. Picture a peaceful setting, such as a beach or a meadow. Add a winding path. Follow the path until you feel more and more relaxed.    Take a few minutes to breathe slowly and feel the calm. When you are ready, slowly take yourself back to the present. Count to 3, and open your eyes.  Follow-up care is a key part of your treatment and safety. Be sure to make and go to all appointments, and call your doctor if you are having problems. It's also a good idea to know your test results and keep a list of the medicines you take.  Where can you learn more?  Go to https://www.[employer]/patiented  Enter T922 in the search box to learn more about \"Weeks 40 to 41 of Your Pregnancy: Care Instructions.\"  Current as of: July 10, 2023               Content Version: 14.0    8623-4570 [employer].   Care instructions adapted under license by your healthcare professional. If you have questions about a medical condition or this instruction, always ask your healthcare professional. [employer] disclaims any warranty or liability for your use of this information.      Labor Induction  What You Need to Know  What is labor induction?  In most cases, it is best to go into labor naturally. When labor does not start on its own, we may use medicine or other methods to start (induce) labor. This is called induction, which:  Helps the uterus contract  Thins, softens and opens the cervix (opening of the uterus)  When is induction used?  There are two types of induction.  Medical induction may be done to protect the health of the parent or baby if:  There are medical concerns for you or your baby.  You " "haven't had your baby by 41 weeks.  Induction for non-medical reasons may be done at 39 weeks or later if:  You live far from the hospital.  You've been having contractions for many days.  You've given birth quickly in the past.   We will not perform an induction for non-medical reasons before 39 weeks. Studies show that babies born before 39 weeks may struggle with breathing, feeding, sleeping and staying warm. They are more likely to have health problems and may need to stay in the hospital longer. If we start your labor for medical reasons, the benefits will outweigh these risks. We will talk to you about your risks, benefits and alternatives (other options) before we start your labor.  How is induction done?  We may start your labor by doing one or more of the following:  We may need to help your cervix soften and open (sometimes called \"ripening\") to prepare it for labor. There are two ways to do this:  Medicines--these may be in the form of pills that you swallow or medicines that are put into the vagina next to the cervix.  Mechanical--using either a single or double balloon. These are small balloons that are attached to tubes that go up inside the cervix. The balloons are then filled with water to put pressure on the cervix and help the cervix soften and open. Depending on the type of balloon used, you may be allowed to go home after it is placed.  After your cervix is ready:  We may give you medicine through an IV (a small tube placed in your vein). This medicine is called Pitocin. It helps the muscles of your uterus to contract.  We may make a small opening in your bag of water (the sac around your baby). This is called an amniotomy. Sometimes called \"breaking the water\". It may help your uterus contract and your cervix open.  What might happen if my labor is induced?  Some of this depends on how your labor is started and how your body responds. Your labor may be more complicated. You and your baby may " need more medical treatments. In general:  You may not go into labor right away. If so, we may send you home with follow-up instructions.  It will be important to monitor you and your baby during the induction.  You may not be able to move around during labor. You will have two belts with monitors attached to your belly. These allow the nurse to watch your contractions and your baby's heart rate.  Your labor may take longer than if you went into labor on your own. It might take more than one day.  If you need cervical ripening, it is important to know that it may be many hours (even days) until delivery happens.  Cervical ripening can be slow and require several doses before your body is ready to labor.  A long labor may increase the risk of infection in mother and baby.  Your labor may not progress as planned. This could lead to a  birth.  Can I plan the date of my delivery?  After 39 weeks, you may ask about planning your delivery date. This is only an option if your body--and your baby--are ready. To find out, we will check your cervix and your baby's heart rate.   If you are ready to be induced, we will give you a date and time to come to the hospital. If many patients are in labor that day, we may need to start your labor at another time.  If you are not ready, we will not start your labor. It will be safer for your baby to come on its own.  What else do I need to know?  Before you have an induction, make sure you understand the reasons, risks and benefits. Ask your doctor or nurse-midwife:  Why do I need to be induced?  What are the risks to my baby?  How will you start my labor?  How will you know if my baby is ready to be born?  How will you know if my body is ready to give birth?  Where can I get more information?  To learn more about induction, you may visit these websites:  The American College of Nurse-Midwives:  www.mymidwife.org  The American College of Obstetricians and Gynecologists:  www.acog.org  Childbirth Connection:  www.childbirthconnection.org  [employer] of [employer]: www.marchofdimes.com  Association of Women's Health, Obstetrics, and  Nursing  www.rogcxs2nip.org/go-the-full-40&#047;  For informational purposes only. Not to replace the advice of your health care provider. Copyright   2008 [facility]. All rights reserved. Clinically reviewed by the  [employer] team. gantto [id number] - REV .

## 2024-06-20 ENCOUNTER — PRENATAL OFFICE VISIT (OUTPATIENT)
Dept: MIDWIFE SERVICES | Facility: CLINIC | Age: 39
End: 2024-06-20
Payer: COMMERCIAL

## 2024-06-20 VITALS
BODY MASS INDEX: 35.28 KG/M2 | TEMPERATURE: 98.1 F | HEART RATE: 90 BPM | WEIGHT: 210.4 LBS | DIASTOLIC BLOOD PRESSURE: 78 MMHG | SYSTOLIC BLOOD PRESSURE: 123 MMHG | OXYGEN SATURATION: 99 %

## 2024-06-20 DIAGNOSIS — O09.513 PRIMIGRAVIDA OF ADVANCED MATERNAL AGE IN THIRD TRIMESTER: Primary | ICD-10-CM

## 2024-06-20 PROCEDURE — 99207 PR PRENATAL VISIT: CPT | Performed by: ADVANCED PRACTICE MIDWIFE

## 2024-06-20 NOTE — PATIENT INSTRUCTIONS
Week 38 of Your Pregnancy: Care Instructions  Believe it or not, your baby is almost here. You may notice how your baby responds to sounds, warmth, cold, and light. You may even know what kind of music your baby likes.    Even if you expect a vaginal birth, it's a good idea to learn about  section ().  is the delivery of a baby through a cut (incision) in your belly. It's a major surgery, so it has more risks than a vaginal birth.    During the first 2 weeks after the birth, limit visitors. Don't allow visitors who have colds or infections. Ask visitors to wash their hands before they touch your baby. And never let anyone smoke around your baby.    Know about unplanned C-sections.  Reasons for an unplanned  include labor that slows or stops, signs of distress in your baby, and high blood pressure or other problems for you.     Know about planned C-sections.  If your baby isn't in a head-down position for delivery (breech position), your doctor may plan a . Or you may have a planned  if you're having twins or more.     Get as much help as you can while you're in the hospital.  You can learn about feeding, diapering, and bathing your baby.     Talk to your doctor or midwife about how to care for the umbilical cord stump.  If your baby will be circumcised, also ask about how to care for that.     Ask friends or family for help, as you need it.  If you can, have another adult in your home for at least 2 or 3 days after the birth.     Try to nap when your baby naps.  This may be your best chance to get some sleep.     Watch for changes in your mental health.  For the first 1 to 2 weeks after birth, it's common to cry or feel sad or irritable. If these feelings last for more than 2 weeks, you may have postpartum depression. Ask your doctor for help. Postpartum depression can be treated.   Follow-up care is a key part of your treatment and safety. Be sure to make and go  "to all appointments, and call your doctor if you are having problems. It's also a good idea to know your test results and keep a list of the medicines you take.  Where can you learn more?  Go to https://www.OYE!.net/patiented  Enter B044 in the search box to learn more about \"Week 38 of Your Pregnancy: Care Instructions.\"  Current as of: July 10, 2023               Content Version: 14.0    7913-3990 [employer].   Care instructions adapted under license by your healthcare professional. If you have questions about a medical condition or this instruction, always ask your healthcare professional. [employer] disclaims any warranty or liability for your use of this information.      Week 39 of Your Pregnancy: Care Instructions    Pana babies can look different than what you see in pictures or movies. Their heads can be a strange shape right after birth. And they may have swollen eyes and red marks on their faces.    You can still get pregnant even if you are breastfeeding. If you don't want to get pregnant, talk to your doctor about birth control.  Tips for week 39 of pregnancy  If you plan to breastfeed, get prepared.     Continue to eat healthy foods.  Keep taking your prenatal vitamins during breastfeeding if your doctor recommends it.  Talk to your doctor before taking any medicines or supplements.  Choose the right birth control for you.     Intrauterine devices (IUDs) are placed in the uterus. Sometimes the IUD can be placed right after giving birth. They work for years.  Hormonal implants are placed under the skin of the arm. They also work for years.  Depo-Provera is a shot. You get it every 3 months.  Birth control pills can be used. They're taken every day.  Tubal ligation (tying your tubes) and vasectomy are surgeries. They're permanent.  Diaphragms, spermicide, and condoms must be used each time you have sex. If you used a diaphragm before, you should get refitted after the " "baby is born.  A birth control patch or ring can be used. These just can't be started until several weeks after you give birth.  Follow-up care is a key part of your treatment and safety. Be sure to make and go to all appointments, and call your doctor if you are having problems. It's also a good idea to know your test results and keep a list of the medicines you take.  Where can you learn more?  Go to https://www.ZOZI.net/patiented  Enter A811 in the search box to learn more about \"Week 39 of Your Pregnancy: Care Instructions.\"  Current as of: July 10, 2023               Content Version: 14.0    6225-3615 [employer].   Care instructions adapted under license by your healthcare professional. If you have questions about a medical condition or this instruction, always ask your healthcare professional. [employer] disclaims any warranty or liability for your use of this information.      Weeks 40 to 41 of Your Pregnancy: Care Instructions  By week 40, you've reached your due date. Your baby could be coming any day. Most babies born after their due dates are healthy. But you may have tests to make sure everything is okay. If you feel stressed, you could try a meditation exercise, such as guided imagery. It may help you relax.    If you are past 41 weeks, your doctor may measure the amount of fluid that surrounds your baby. They may also test your baby's movement and heart rate.    If you don't start labor on your own by 41 or 42 weeks, your doctor may want to start (induce) labor. If there are other concerns, your doctor may talk to you about a .  To start (induce) your labor, your doctor may do any of these things.    Place a narrow tube with a small balloon on the end (balloon catheter) into your cervix.  The doctor inflates the balloon. This helps your cervix open (dilate).     Sweep the membranes (separate the lining of the amniotic sac from the uterus).  This helps the " "uterus make a chemical that can start contractions.     \"Break your water\" (rupture the amniotic sac).  This may be done if your cervix has started to open.     Use medicines.  They may be used to soften the cervix or start contractions.   How to do guided imagery    Close your eyes, and take a few deep breaths. Picture a peaceful setting, such as a beach or a meadow. Add a winding path. Follow the path until you feel more and more relaxed.    Take a few minutes to breathe slowly and feel the calm. When you are ready, slowly take yourself back to the present. Count to 3, and open your eyes.  Follow-up care is a key part of your treatment and safety. Be sure to make and go to all appointments, and call your doctor if you are having problems. It's also a good idea to know your test results and keep a list of the medicines you take.  Where can you learn more?  Go to https://www.InExchange.ZoomForth/patiented  Enter T922 in the search box to learn more about \"Weeks 40 to 41 of Your Pregnancy: Care Instructions.\"  Current as of: July 10, 2023               Content Version: 14.0    5362-4880 [employer].   Care instructions adapted under license by your healthcare professional. If you have questions about a medical condition or this instruction, always ask your healthcare professional. [employer] disclaims any warranty or liability for your use of this information.      Labor Induction  What You Need to Know  What is labor induction?  In most cases, it is best to go into labor naturally. When labor does not start on its own, we may use medicine or other methods to start (induce) labor. This is called induction, which:  Helps the uterus contract  Thins, softens and opens the cervix (opening of the uterus)  When is induction used?  There are two types of induction.  Medical induction may be done to protect the health of the parent or baby if:  There are medical concerns for you or your baby.  You " "haven't had your baby by 41 weeks.  Induction for non-medical reasons may be done at 39 weeks or later if:  You live far from the hospital.  You've been having contractions for many days.  You've given birth quickly in the past.   We will not perform an induction for non-medical reasons before 39 weeks. Studies show that babies born before 39 weeks may struggle with breathing, feeding, sleeping and staying warm. They are more likely to have health problems and may need to stay in the hospital longer. If we start your labor for medical reasons, the benefits will outweigh these risks. We will talk to you about your risks, benefits and alternatives (other options) before we start your labor.  How is induction done?  We may start your labor by doing one or more of the following:  We may need to help your cervix soften and open (sometimes called \"ripening\") to prepare it for labor. There are two ways to do this:  Medicines--these may be in the form of pills that you swallow or medicines that are put into the vagina next to the cervix.  Mechanical--using either a single or double balloon. These are small balloons that are attached to tubes that go up inside the cervix. The balloons are then filled with water to put pressure on the cervix and help the cervix soften and open. Depending on the type of balloon used, you may be allowed to go home after it is placed.  After your cervix is ready:  We may give you medicine through an IV (a small tube placed in your vein). This medicine is called Pitocin. It helps the muscles of your uterus to contract.  We may make a small opening in your bag of water (the sac around your baby). This is called an amniotomy. Sometimes called \"breaking the water\". It may help your uterus contract and your cervix open.  What might happen if my labor is induced?  Some of this depends on how your labor is started and how your body responds. Your labor may be more complicated. You and your baby may " need more medical treatments. In general:  You may not go into labor right away. If so, we may send you home with follow-up instructions.  It will be important to monitor you and your baby during the induction.  You may not be able to move around during labor. You will have two belts with monitors attached to your belly. These allow the nurse to watch your contractions and your baby's heart rate.  Your labor may take longer than if you went into labor on your own. It might take more than one day.  If you need cervical ripening, it is important to know that it may be many hours (even days) until delivery happens.  Cervical ripening can be slow and require several doses before your body is ready to labor.  A long labor may increase the risk of infection in mother and baby.  Your labor may not progress as planned. This could lead to a  birth.  Can I plan the date of my delivery?  After 39 weeks, you may ask about planning your delivery date. This is only an option if your body--and your baby--are ready. To find out, we will check your cervix and your baby's heart rate.   If you are ready to be induced, we will give you a date and time to come to the hospital. If many patients are in labor that day, we may need to start your labor at another time.  If you are not ready, we will not start your labor. It will be safer for your baby to come on its own.  What else do I need to know?  Before you have an induction, make sure you understand the reasons, risks and benefits. Ask your doctor or nurse-midwife:  Why do I need to be induced?  What are the risks to my baby?  How will you start my labor?  How will you know if my baby is ready to be born?  How will you know if my body is ready to give birth?  Where can I get more information?  To learn more about induction, you may visit these websites:  The American College of Nurse-Midwives:  www.mymidwife.org  The American College of Obstetricians and Gynecologists:  www.acog.org  Childbirth Connection:  www.childbirthconnection.org  March of Dimes: www.marchofdimes.com  Association of Women's Health, Obstetrics, and  Nursing  www.njkanv7mvp.org/go-the-full-40&#047;  For informational purposes only. Not to replace the advice of your health care provider. Copyright   2008 [facility]. All rights reserved. Clinically reviewed by the  System Operations [employer] team. i-drive [id number] - REV .

## 2024-06-20 NOTE — PROGRESS NOTES
39w3d  Donna is feeling well today. No signs of labor yet, just occasional [place] contractions. Reports good fetal movement. Denies leaking of fluid, vaginal bleeding, regular uterine contractions, headache, visual changes, or other concerns. Discussed options for IOL vs post dates testing at 41w. She is unsure what she will want to do. Will return to clinic in 1 week and continue discussing at that time.    EDUARDA Bartholomew CNM

## 2024-06-26 ENCOUNTER — PRENATAL OFFICE VISIT (OUTPATIENT)
Dept: MIDWIFE SERVICES | Facility: CLINIC | Age: 39
End: 2024-06-26
Payer: COMMERCIAL

## 2024-06-26 VITALS
TEMPERATURE: 97.9 F | DIASTOLIC BLOOD PRESSURE: 81 MMHG | BODY MASS INDEX: 35.7 KG/M2 | OXYGEN SATURATION: 98 % | WEIGHT: 212.9 LBS | SYSTOLIC BLOOD PRESSURE: 131 MMHG | HEART RATE: 84 BPM

## 2024-06-26 DIAGNOSIS — Z34.03 ENCOUNTER FOR SUPERVISION OF NORMAL FIRST PREGNANCY, THIRD TRIMESTER: Primary | ICD-10-CM

## 2024-06-26 PROCEDURE — 99207 PR PRENATAL VISIT: CPT | Performed by: ADVANCED PRACTICE MIDWIFE

## 2024-06-26 NOTE — PROGRESS NOTES
40w2d  Feeling well. Postdates testing ordered for Friday. Discussed IOL for Monday or continued surveillence. Reports good fetal movement. Denies leaking of fluid, vaginal bleeding, regular uterine contractions, headache or other concerns.  RTC in 3 days. YOANDY

## 2024-06-26 NOTE — PATIENT INSTRUCTIONS
Week 38 of Your Pregnancy: Care Instructions  Believe it or not, your baby is almost here. You may notice how your baby responds to sounds, warmth, cold, and light. You may even know what kind of music your baby likes.    Even if you expect a vaginal birth, it's a good idea to learn about  section ().  is the delivery of a baby through a cut (incision) in your belly. It's a major surgery, so it has more risks than a vaginal birth.    During the first 2 weeks after the birth, limit visitors. Don't allow visitors who have colds or infections. Ask visitors to wash their hands before they touch your baby. And never let anyone smoke around your baby.    Know about unplanned C-sections.  Reasons for an unplanned  include labor that slows or stops, signs of distress in your baby, and high blood pressure or other problems for you.     Know about planned C-sections.  If your baby isn't in a head-down position for delivery (breech position), your doctor may plan a . Or you may have a planned  if you're having twins or more.     Get as much help as you can while you're in the hospital.  You can learn about feeding, diapering, and bathing your baby.     Talk to your doctor or midwife about how to care for the umbilical cord stump.  If your baby will be circumcised, also ask about how to care for that.     Ask friends or family for help, as you need it.  If you can, have another adult in your home for at least 2 or 3 days after the birth.     Try to nap when your baby naps.  This may be your best chance to get some sleep.     Watch for changes in your mental health.  For the first 1 to 2 weeks after birth, it's common to cry or feel sad or irritable. If these feelings last for more than 2 weeks, you may have postpartum depression. Ask your doctor for help. Postpartum depression can be treated.   Follow-up care is a key part of your treatment and safety. Be sure to make and go  "to all appointments, and call your doctor if you are having problems. It's also a good idea to know your test results and keep a list of the medicines you take.  Where can you learn more?  Go to https://www.[employer].net/patiented  Enter B044 in the search box to learn more about \"Week 38 of Your Pregnancy: Care Instructions.\"  Current as of: July 10, 2023               Content Version: 14.0    3447-2536 [employer].   Care instructions adapted under license by your healthcare professional. If you have questions about a medical condition or this instruction, always ask your healthcare professional. [employer] disclaims any warranty or liability for your use of this information.      Week 39 of Your Pregnancy: Care Instructions    [place] babies can look different than what you see in pictures or movies. Their heads can be a strange shape right after birth. And they may have swollen eyes and red marks on their faces.    You can still get pregnant even if you are breastfeeding. If you don't want to get pregnant, talk to your doctor about birth control.  Tips for week 39 of pregnancy  If you plan to breastfeed, get prepared.     Continue to eat healthy foods.  Keep taking your prenatal vitamins during breastfeeding if your doctor recommends it.  Talk to your doctor before taking any medicines or supplements.  Choose the right birth control for you.     Intrauterine devices (IUDs) are placed in the uterus. Sometimes the IUD can be placed right after giving birth. They work for years.  Hormonal implants are placed under the skin of the arm. They also work for years.  Depo-Provera is a shot. You get it every 3 months.  Birth control pills can be used. They're taken every day.  Tubal ligation (tying your tubes) and vasectomy are surgeries. They're permanent.  Diaphragms, spermicide, and condoms must be used each time you have sex. If you used a diaphragm before, you should get refitted after the " "baby is born.  A birth control patch or ring can be used. These just can't be started until several weeks after you give birth.  Follow-up care is a key part of your treatment and safety. Be sure to make and go to all appointments, and call your doctor if you are having problems. It's also a good idea to know your test results and keep a list of the medicines you take.  Where can you learn more?  Go to https://www.[facility].net/patiented  Enter A811 in the search box to learn more about \"Week 39 of Your Pregnancy: Care Instructions.\"  Current as of: July 10, 2023               Content Version: 14.0    8881-5151 [employer].   Care instructions adapted under license by your healthcare professional. If you have questions about a medical condition or this instruction, always ask your healthcare professional. [employer] disclaims any warranty or liability for your use of this information.      Weeks 40 to 41 of Your Pregnancy: Care Instructions  By week 40, you've reached your due date. Your baby could be coming any day. Most babies born after their due dates are healthy. But you may have tests to make sure everything is okay. If you feel stressed, you could try a meditation exercise, such as guided imagery. It may help you relax.    If you are past 41 weeks, your doctor may measure the amount of fluid that surrounds your baby. They may also test your baby's movement and heart rate.    If you don't start labor on your own by 41 or 42 weeks, your doctor may want to start (induce) labor. If there are other concerns, your doctor may talk to you about a .  To start (induce) your labor, your doctor may do any of these things.    Place a narrow tube with a small balloon on the end (balloon catheter) into your cervix.  The doctor inflates the balloon. This helps your cervix open (dilate).     Sweep the membranes (separate the lining of the amniotic sac from the uterus).  This helps the " "uterus make a chemical that can start contractions.     \"Break your water\" (rupture the amniotic sac).  This may be done if your cervix has started to open.     Use medicines.  They may be used to soften the cervix or start contractions.   How to do guided imagery    Close your eyes, and take a few deep breaths. Picture a peaceful setting, such as a beach or a meadow. Add a winding path. Follow the path until you feel more and more relaxed.    Take a few minutes to breathe slowly and feel the calm. When you are ready, slowly take yourself back to the present. Count to 3, and open your eyes.  Follow-up care is a key part of your treatment and safety. Be sure to make and go to all appointments, and call your doctor if you are having problems. It's also a good idea to know your test results and keep a list of the medicines you take.  Where can you learn more?  Go to https://www.[employer]/patiented  Enter T922 in the search box to learn more about \"Weeks 40 to 41 of Your Pregnancy: Care Instructions.\"  Current as of: July 10, 2023               Content Version: 14.0    5420-1217 [employer].   Care instructions adapted under license by your healthcare professional. If you have questions about a medical condition or this instruction, always ask your healthcare professional. [employer] disclaims any warranty or liability for your use of this information.      Labor Induction  What You Need to Know  What is labor induction?  In most cases, it is best to go into labor naturally. When labor does not start on its own, we may use medicine or other methods to start (induce) labor. This is called induction, which:  Helps the uterus contract  Thins, softens and opens the cervix (opening of the uterus)  When is induction used?  There are two types of induction.  Medical induction may be done to protect the health of the parent or baby if:  There are medical concerns for you or your baby.  You " "haven't had your baby by 41 weeks.  Induction for non-medical reasons may be done at 39 weeks or later if:  You live far from the hospital.  You've been having contractions for many days.  You've given birth quickly in the past.   We will not perform an induction for non-medical reasons before 39 weeks. Studies show that babies born before 39 weeks may struggle with breathing, feeding, sleeping and staying warm. They are more likely to have health problems and may need to stay in the hospital longer. If we start your labor for medical reasons, the benefits will outweigh these risks. We will talk to you about your risks, benefits and alternatives (other options) before we start your labor.  How is induction done?  We may start your labor by doing one or more of the following:  We may need to help your cervix soften and open (sometimes called \"ripening\") to prepare it for labor. There are two ways to do this:  Medicines--these may be in the form of pills that you swallow or medicines that are put into the vagina next to the cervix.  Mechanical--using either a single or double balloon. These are small balloons that are attached to tubes that go up inside the cervix. The balloons are then filled with water to put pressure on the cervix and help the cervix soften and open. Depending on the type of balloon used, you may be allowed to go home after it is placed.  After your cervix is ready:  We may give you medicine through an IV (a small tube placed in your vein). This medicine is called Pitocin. It helps the muscles of your uterus to contract.  We may make a small opening in your bag of water (the sac around your baby). This is called an amniotomy. Sometimes called \"breaking the water\". It may help your uterus contract and your cervix open.  What might happen if my labor is induced?  Some of this depends on how your labor is started and how your body responds. Your labor may be more complicated. You and your baby may " need more medical treatments. In general:  You may not go into labor right away. If so, we may send you home with follow-up instructions.  It will be important to monitor you and your baby during the induction.  You may not be able to move around during labor. You will have two belts with monitors attached to your belly. These allow the nurse to watch your contractions and your baby's heart rate.  Your labor may take longer than if you went into labor on your own. It might take more than one day.  If you need cervical ripening, it is important to know that it may be many hours (even days) until delivery happens.  Cervical ripening can be slow and require several doses before your body is ready to labor.  A long labor may increase the risk of infection in mother and baby.  Your labor may not progress as planned. This could lead to a  birth.  Can I plan the date of my delivery?  After 39 weeks, you may ask about planning your delivery date. This is only an option if your body--and your baby--are ready. To find out, we will check your cervix and your baby's heart rate.   If you are ready to be induced, we will give you a date and time to come to the hospital. If many patients are in labor that day, we may need to start your labor at another time.  If you are not ready, we will not start your labor. It will be safer for your baby to come on its own.  What else do I need to know?  Before you have an induction, make sure you understand the reasons, risks and benefits. Ask your doctor or nurse-midwife:  Why do I need to be induced?  What are the risks to my baby?  How will you start my labor?  How will you know if my baby is ready to be born?  How will you know if my body is ready to give birth?  Where can I get more information?  To learn more about induction, you may visit these websites:  The American College of Nurse-Midwives:  www.mymidwife.org  The American College of Obstetricians and Gynecologists:  www.acog.org  Childbirth Connection:  www.childbirthconnection.org  March of Dimes: www.marchofdimes.com  Association of Women's Health, Obstetrics, and  Nursing  www.tqzvxt3gqu.org/go-the-full-40&#047;  For informational purposes only. Not to replace the advice of your health care provider. Copyright   2008 [facility]. All rights reserved. Clinically reviewed by the  System Operations Leadership team. [employer] [id number] - REV .

## 2024-06-28 ENCOUNTER — ANCILLARY PROCEDURE (OUTPATIENT)
Dept: ULTRASOUND IMAGING | Facility: CLINIC | Age: 39
End: 2024-06-28
Attending: ADVANCED PRACTICE MIDWIFE
Payer: COMMERCIAL

## 2024-06-28 ENCOUNTER — PRENATAL OFFICE VISIT (OUTPATIENT)
Dept: MIDWIFE SERVICES | Facility: CLINIC | Age: 39
End: 2024-06-28
Attending: ADVANCED PRACTICE MIDWIFE
Payer: COMMERCIAL

## 2024-06-28 VITALS
HEART RATE: 79 BPM | DIASTOLIC BLOOD PRESSURE: 70 MMHG | OXYGEN SATURATION: 97 % | BODY MASS INDEX: 35.13 KG/M2 | TEMPERATURE: 97 F | SYSTOLIC BLOOD PRESSURE: 118 MMHG | WEIGHT: 209.5 LBS

## 2024-06-28 DIAGNOSIS — Z34.03 ENCOUNTER FOR SUPERVISION OF NORMAL FIRST PREGNANCY, THIRD TRIMESTER: ICD-10-CM

## 2024-06-28 DIAGNOSIS — O09.513 AMA (ADVANCED MATERNAL AGE) PRIMIGRAVIDA 35+, THIRD TRIMESTER: Primary | ICD-10-CM

## 2024-06-28 PROCEDURE — 76815 OB US LIMITED FETUS(S): CPT | Performed by: OBSTETRICS & GYNECOLOGY

## 2024-06-28 PROCEDURE — 59025 FETAL NON-STRESS TEST: CPT | Performed by: ADVANCED PRACTICE MIDWIFE

## 2024-06-28 PROCEDURE — 99207 PR PRENATAL VISIT: CPT | Performed by: ADVANCED PRACTICE MIDWIFE

## 2024-06-28 NOTE — PROGRESS NOTES
40w4d  Donna here with Silvino, feeling well. Positive fetal movement. Open to membrane sweep today but cervix closed on exam. Discussed natural ripening methods.     Ultrasound for post dates today: ASHLEY and MVP WNL, cephalic position, ultrasound tech able to see all BPP marks, but did not do BPP today.  NST for post-dates: Baseline HR: 120, variability: moderate, positive accelerations, no decelerations. Some uterine irritability noted. Reactive NST.     Discussed induction timing. Considering induction just before 42 weeks, will determine date works best for them at next appointment.  RTC 3-4 days for continues postdates monitoring.    NORRIS Breen    I was present with the TONNY student who participated in the service and in the documentation of the services provided. I have verified the history and personally performed the physical exam and medical decision making, as documented by the student and edited by me. EDUARDA Keller CNM

## 2024-07-02 ENCOUNTER — PRENATAL OFFICE VISIT (OUTPATIENT)
Dept: MIDWIFE SERVICES | Facility: CLINIC | Age: 39
End: 2024-07-02
Payer: COMMERCIAL

## 2024-07-02 VITALS
HEART RATE: 93 BPM | SYSTOLIC BLOOD PRESSURE: 121 MMHG | OXYGEN SATURATION: 99 % | BODY MASS INDEX: 35.52 KG/M2 | WEIGHT: 211.8 LBS | DIASTOLIC BLOOD PRESSURE: 74 MMHG

## 2024-07-02 DIAGNOSIS — O48.0 POST-TERM PREGNANCY, 40-42 WEEKS OF GESTATION: Primary | ICD-10-CM

## 2024-07-02 PROCEDURE — 99207 PR PRENATAL VISIT: CPT | Performed by: ADVANCED PRACTICE MIDWIFE

## 2024-07-02 NOTE — PROGRESS NOTES
41w1d  Donna is feeling well, here with Silvino. Cervical exam 1/25%/-3/posterior/soft. Johnson 3. Membrane sweep performed per pt request. Discussed she will likely need cervical ripening at time of induction, methods discussed. Reviewed expectation for length of induction.  IOL scheduled for Thursday 7/4/2024 at 1930. Will notify on call CNM.   NST for postdates testing: basline , moderate variability, >2 accelerations, no decels, reactive NST. No contractions noted. NST sent to scanning.   Post dates ASHLEY US ordered. Was not scheduled for today. Able to get in tomorrow.   Labor precautions reviewed  Cherise Maguire, EDUARDA Conde, CNM  I was present with the student who participated in the service and documentation of the services provided. I have verified the history and personally performed the physical exam and medical decision making as documented by the student and edited by me.

## 2024-07-02 NOTE — Clinical Note
Helbrandon! Donna is a 39yo  scheduled for post dates IOL on , coming in at 7:30pm for cervical ripening. Montez is on , Faith on . 1cm today with nicholson 3, membranes swept. She'll be 41w4d.   Thanks! Cehy

## 2024-07-02 NOTE — PATIENT INSTRUCTIONS
"Induction of labor, starting with cervical ripening, scheduled 7/4/24 ay 7:30pm    Please call  at [phone] 1 hour prior to your induction to make sure they have the staff for you.     Labor Induction  What You Need to Know  What is labor induction?  In most cases, it is best to go into labor naturally. When labor does not start on its own, we may use medicine or other methods to start (induce) labor. This is called induction, which:  Helps the uterus contract  Thins, softens and opens the cervix (opening of the uterus)  When is induction used?  There are two types of induction.  Medical induction may be done to protect the health of the parent or baby if:  There are medical concerns for you or your baby.  You haven't had your baby by 41 weeks.  Induction for non-medical reasons may be done at 39 weeks or later if:  You live far from the hospital.  You've been having contractions for many days.  You've given birth quickly in the past.   We will not perform an induction for non-medical reasons before 39 weeks. Studies show that babies born before 39 weeks may struggle with breathing, feeding, sleeping and staying warm. They are more likely to have health problems and may need to stay in the hospital longer. If we start your labor for medical reasons, the benefits will outweigh these risks. We will talk to you about your risks, benefits and alternatives (other options) before we start your labor.  How is induction done?  We may start your labor by doing one or more of the following:  We may need to help your cervix soften and open (sometimes called \"ripening\") to prepare it for labor. There are two ways to do this:  Medicines--these may be in the form of pills that you swallow or medicines that are put into the vagina next to the cervix.  Mechanical--using either a single or double balloon. These are small balloons that are attached to tubes that go up inside the cervix. The balloons are then filled with water " "to put pressure on the cervix and help the cervix soften and open. Depending on the type of balloon used, you may be allowed to go home after it is placed.  After your cervix is ready:  We may give you medicine through an IV (a small tube placed in your vein). This medicine is called Pitocin. It helps the muscles of your uterus to contract.  We may make a small opening in your bag of water (the sac around your baby). This is called an amniotomy. Sometimes called \"breaking the water\". It may help your uterus contract and your cervix open.  What might happen if my labor is induced?  Some of this depends on how your labor is started and how your body responds. Your labor may be more complicated. You and your baby may need more medical treatments. In general:  You may not go into labor right away. If so, we may send you home with follow-up instructions.  It will be important to monitor you and your baby during the induction.  You may not be able to move around during labor. You will have two belts with monitors attached to your belly. These allow the nurse to watch your contractions and your baby's heart rate.  Your labor may take longer than if you went into labor on your own. It might take more than one day.  If you need cervical ripening, it is important to know that it may be many hours (even days) until delivery happens.  Cervical ripening can be slow and require several doses before your body is ready to labor.  A long labor may increase the risk of infection in mother and baby.  Your labor may not progress as planned. This could lead to a  birth.  Can I plan the date of my delivery?  After 39 weeks, you may ask about planning your delivery date. This is only an option if your body--and your baby--are ready. To find out, we will check your cervix and your baby's heart rate.   If you are ready to be induced, we will give you a date and time to come to the hospital. If many patients are in labor that day, we " may need to start your labor at another time.  If you are not ready, we will not start your labor. It will be safer for your baby to come on its own.  What else do I need to know?  Before you have an induction, make sure you understand the reasons, risks and benefits. Ask your doctor or nurse-midwife:  Why do I need to be induced?  What are the risks to my baby?  How will you start my labor?  How will you know if my baby is ready to be born?  How will you know if my body is ready to give birth?  Where can I get more information?  To learn more about induction, you may visit these websites:  The American College of Nurse-Midwives:  www.mymidwife.org  The American College of Obstetricians and Gynecologists: www.acog.org  Childbirth Connection:  www.childbirthconnection.org  March of Dimes: www.marchofdimes.com  Association of Women's Health, Obstetrics, and  Nursing  www.shihax7jzf.org/go-the-full-40&#047;  For informational purposes only. Not to replace the advice of your health care provider. Copyright   2008 [employer]. All rights reserved. Clinically reviewed by the  System Operations Leadership team. [employer] [id number] - REV .

## 2024-07-03 ENCOUNTER — ANCILLARY PROCEDURE (OUTPATIENT)
Dept: ULTRASOUND IMAGING | Facility: CLINIC | Age: 39
End: 2024-07-03
Attending: ADVANCED PRACTICE MIDWIFE
Payer: COMMERCIAL

## 2024-07-03 DIAGNOSIS — O48.0 POST-TERM PREGNANCY, 40-42 WEEKS OF GESTATION: ICD-10-CM

## 2024-07-03 PROCEDURE — 76816 OB US FOLLOW-UP PER FETUS: CPT | Mod: TC | Performed by: RADIOLOGY

## 2024-07-04 ENCOUNTER — ANESTHESIA (OUTPATIENT)
Dept: OBGYN | Facility: CLINIC | Age: 39
End: 2024-07-04
Payer: COMMERCIAL

## 2024-07-04 ENCOUNTER — ANESTHESIA EVENT (OUTPATIENT)
Dept: OBGYN | Facility: CLINIC | Age: 39
End: 2024-07-04
Payer: COMMERCIAL

## 2024-07-04 ENCOUNTER — HOSPITAL ENCOUNTER (INPATIENT)
Facility: CLINIC | Age: 39
LOS: 4 days | Discharge: HOME HEALTH | End: 2024-07-08
Attending: ADVANCED PRACTICE MIDWIFE | Admitting: ADVANCED PRACTICE MIDWIFE
Payer: COMMERCIAL

## 2024-07-04 ENCOUNTER — TELEPHONE (OUTPATIENT)
Dept: MIDWIFE SERVICES | Facility: CLINIC | Age: 39
End: 2024-07-04

## 2024-07-04 ENCOUNTER — NURSE TRIAGE (OUTPATIENT)
Dept: NURSING | Facility: CLINIC | Age: 39
End: 2024-07-04

## 2024-07-04 DIAGNOSIS — D62 ANEMIA DUE TO BLOOD LOSS, ACUTE: ICD-10-CM

## 2024-07-04 PROBLEM — O09.513 AMA (ADVANCED MATERNAL AGE) PRIMIGRAVIDA 35+, THIRD TRIMESTER: Status: ACTIVE | Noted: 2024-07-04

## 2024-07-04 PROBLEM — Z36.89 ENCOUNTER FOR TRIAGE IN PREGNANT PATIENT: Status: ACTIVE | Noted: 2024-07-04

## 2024-07-04 LAB
ABO/RH(D): NORMAL
ANTIBODY SCREEN: NEGATIVE
CRYSTALS AMN MICRO: NORMAL
ERYTHROCYTE [DISTWIDTH] IN BLOOD BY AUTOMATED COUNT: 12.7 % (ref 10–15)
HCT VFR BLD AUTO: 39.6 % (ref 35–47)
HGB BLD-MCNC: 13.7 G/DL (ref 11.7–15.7)
MCH RBC QN AUTO: 32.5 PG (ref 26.5–33)
MCHC RBC AUTO-ENTMCNC: 34.6 G/DL (ref 31.5–36.5)
MCV RBC AUTO: 94 FL (ref 78–100)
PLATELET # BLD AUTO: 196 10E3/UL (ref 150–450)
RBC # BLD AUTO: 4.21 10E6/UL (ref 3.8–5.2)
RUPTURE OF FETAL MEMBRANES BY ROM PLUS: NEGATIVE
SPECIMEN EXPIRATION DATE: NORMAL
WBC # BLD AUTO: 10.9 10E3/UL (ref 4–11)

## 2024-07-04 PROCEDURE — 86900 BLOOD TYPING SEROLOGIC ABO: CPT | Performed by: ADVANCED PRACTICE MIDWIFE

## 2024-07-04 PROCEDURE — 99221 1ST HOSP IP/OBS SF/LOW 40: CPT | Mod: 25 | Performed by: ADVANCED PRACTICE MIDWIFE

## 2024-07-04 PROCEDURE — 59200 INSERT CERVICAL DILATOR: CPT | Performed by: ADVANCED PRACTICE MIDWIFE

## 2024-07-04 PROCEDURE — 85027 COMPLETE CBC AUTOMATED: CPT | Performed by: ADVANCED PRACTICE MIDWIFE

## 2024-07-04 PROCEDURE — 999N000285 HC STATISTIC VASC ACCESS LAB DRAW WITH PIV START

## 2024-07-04 PROCEDURE — 250N000013 HC RX MED GY IP 250 OP 250 PS 637: Performed by: ADVANCED PRACTICE MIDWIFE

## 2024-07-04 PROCEDURE — 59025 FETAL NON-STRESS TEST: CPT | Mod: 26 | Performed by: ADVANCED PRACTICE MIDWIFE

## 2024-07-04 PROCEDURE — 370N000003 HC ANESTHESIA WARD SERVICE: Performed by: ANESTHESIOLOGY

## 2024-07-04 PROCEDURE — 10907ZC DRAINAGE OF AMNIOTIC FLUID, THERAPEUTIC FROM PRODUCTS OF CONCEPTION, VIA NATURAL OR ARTIFICIAL OPENING: ICD-10-PCS | Performed by: ADVANCED PRACTICE MIDWIFE

## 2024-07-04 PROCEDURE — 86780 TREPONEMA PALLIDUM: CPT | Performed by: ADVANCED PRACTICE MIDWIFE

## 2024-07-04 PROCEDURE — 120N000002 HC R&B MED SURG/OB UMMC

## 2024-07-04 PROCEDURE — G0463 HOSPITAL OUTPT CLINIC VISIT: HCPCS | Mod: 25

## 2024-07-04 PROCEDURE — 999N000127 HC STATISTIC PERIPHERAL IV START W US GUIDANCE

## 2024-07-04 PROCEDURE — 84112 EVAL AMNIOTIC FLUID PROTEIN: CPT | Performed by: ADVANCED PRACTICE MIDWIFE

## 2024-07-04 RX ORDER — NALOXONE HYDROCHLORIDE 0.4 MG/ML
0.2 INJECTION, SOLUTION INTRAMUSCULAR; INTRAVENOUS; SUBCUTANEOUS
Status: DISCONTINUED | OUTPATIENT
Start: 2024-07-04 | End: 2024-07-04

## 2024-07-04 RX ORDER — OXYTOCIN 10 [USP'U]/ML
10 INJECTION, SOLUTION INTRAMUSCULAR; INTRAVENOUS
Status: DISCONTINUED | OUTPATIENT
Start: 2024-07-04 | End: 2024-07-06 | Stop reason: HOSPADM

## 2024-07-04 RX ORDER — OXYTOCIN/0.9 % SODIUM CHLORIDE 30/500 ML
100-340 PLASTIC BAG, INJECTION (ML) INTRAVENOUS CONTINUOUS PRN
Status: DISCONTINUED | OUTPATIENT
Start: 2024-07-04 | End: 2024-07-08 | Stop reason: HOSPADM

## 2024-07-04 RX ORDER — MORPHINE SULFATE 10 MG/ML
10 INJECTION, SOLUTION INTRAMUSCULAR; INTRAVENOUS
Status: DISCONTINUED | OUTPATIENT
Start: 2024-07-04 | End: 2024-07-06 | Stop reason: HOSPADM

## 2024-07-04 RX ORDER — LOPERAMIDE HCL 2 MG
4 CAPSULE ORAL
Status: DISCONTINUED | OUTPATIENT
Start: 2024-07-04 | End: 2024-07-06 | Stop reason: HOSPADM

## 2024-07-04 RX ORDER — NALOXONE HYDROCHLORIDE 0.4 MG/ML
0.4 INJECTION, SOLUTION INTRAMUSCULAR; INTRAVENOUS; SUBCUTANEOUS
Status: DISCONTINUED | OUTPATIENT
Start: 2024-07-04 | End: 2024-07-04

## 2024-07-04 RX ORDER — CITRIC ACID/SODIUM CITRATE 334-500MG
30 SOLUTION, ORAL ORAL
Status: DISCONTINUED | OUTPATIENT
Start: 2024-07-04 | End: 2024-07-06 | Stop reason: HOSPADM

## 2024-07-04 RX ORDER — METOCLOPRAMIDE HYDROCHLORIDE 5 MG/ML
10 INJECTION INTRAMUSCULAR; INTRAVENOUS EVERY 6 HOURS PRN
Status: DISCONTINUED | OUTPATIENT
Start: 2024-07-04 | End: 2024-07-06 | Stop reason: HOSPADM

## 2024-07-04 RX ORDER — TRANEXAMIC ACID 10 MG/ML
1 INJECTION, SOLUTION INTRAVENOUS EVERY 30 MIN PRN
Status: DISCONTINUED | OUTPATIENT
Start: 2024-07-04 | End: 2024-07-06 | Stop reason: HOSPADM

## 2024-07-04 RX ORDER — NALOXONE HYDROCHLORIDE 0.4 MG/ML
0.4 INJECTION, SOLUTION INTRAMUSCULAR; INTRAVENOUS; SUBCUTANEOUS
Status: DISCONTINUED | OUTPATIENT
Start: 2024-07-04 | End: 2024-07-06 | Stop reason: HOSPADM

## 2024-07-04 RX ORDER — HYDROXYZINE HYDROCHLORIDE 50 MG/1
50 TABLET, FILM COATED ORAL
Status: DISCONTINUED | OUTPATIENT
Start: 2024-07-04 | End: 2024-07-06 | Stop reason: HOSPADM

## 2024-07-04 RX ORDER — METHYLERGONOVINE MALEATE 0.2 MG/ML
200 INJECTION INTRAVENOUS
Status: DISCONTINUED | OUTPATIENT
Start: 2024-07-04 | End: 2024-07-06 | Stop reason: HOSPADM

## 2024-07-04 RX ORDER — ACETAMINOPHEN 325 MG/1
650 TABLET ORAL EVERY 4 HOURS PRN
Status: DISCONTINUED | OUTPATIENT
Start: 2024-07-04 | End: 2024-07-05

## 2024-07-04 RX ORDER — PROCHLORPERAZINE MALEATE 10 MG
10 TABLET ORAL EVERY 6 HOURS PRN
Status: DISCONTINUED | OUTPATIENT
Start: 2024-07-04 | End: 2024-07-06 | Stop reason: HOSPADM

## 2024-07-04 RX ORDER — KETOROLAC TROMETHAMINE 30 MG/ML
30 INJECTION, SOLUTION INTRAMUSCULAR; INTRAVENOUS
Status: COMPLETED | OUTPATIENT
Start: 2024-07-04 | End: 2024-07-07

## 2024-07-04 RX ORDER — FENTANYL CITRATE 50 UG/ML
100 INJECTION, SOLUTION INTRAMUSCULAR; INTRAVENOUS
Status: DISCONTINUED | OUTPATIENT
Start: 2024-07-04 | End: 2024-07-06 | Stop reason: HOSPADM

## 2024-07-04 RX ORDER — ONDANSETRON 2 MG/ML
4 INJECTION INTRAMUSCULAR; INTRAVENOUS EVERY 6 HOURS PRN
Status: DISCONTINUED | OUTPATIENT
Start: 2024-07-04 | End: 2024-07-06 | Stop reason: HOSPADM

## 2024-07-04 RX ORDER — METOCLOPRAMIDE 10 MG/1
10 TABLET ORAL EVERY 6 HOURS PRN
Status: DISCONTINUED | OUTPATIENT
Start: 2024-07-04 | End: 2024-07-06 | Stop reason: HOSPADM

## 2024-07-04 RX ORDER — PROCHLORPERAZINE 25 MG
25 SUPPOSITORY, RECTAL RECTAL EVERY 12 HOURS PRN
Status: DISCONTINUED | OUTPATIENT
Start: 2024-07-04 | End: 2024-07-06 | Stop reason: HOSPADM

## 2024-07-04 RX ORDER — CARBOPROST TROMETHAMINE 250 UG/ML
250 INJECTION, SOLUTION INTRAMUSCULAR
Status: DISCONTINUED | OUTPATIENT
Start: 2024-07-04 | End: 2024-07-06 | Stop reason: HOSPADM

## 2024-07-04 RX ORDER — OXYTOCIN/0.9 % SODIUM CHLORIDE 30/500 ML
340 PLASTIC BAG, INJECTION (ML) INTRAVENOUS CONTINUOUS PRN
Status: DISCONTINUED | OUTPATIENT
Start: 2024-07-04 | End: 2024-07-06 | Stop reason: HOSPADM

## 2024-07-04 RX ORDER — NALBUPHINE HYDROCHLORIDE 20 MG/ML
2.5-5 INJECTION, SOLUTION INTRAMUSCULAR; INTRAVENOUS; SUBCUTANEOUS EVERY 6 HOURS PRN
Status: DISCONTINUED | OUTPATIENT
Start: 2024-07-04 | End: 2024-07-04

## 2024-07-04 RX ORDER — FENTANYL/ROPIVACAINE/NS/PF 2MCG/ML-.1
PLASTIC BAG, INJECTION (ML) EPIDURAL
Status: DISCONTINUED | OUTPATIENT
Start: 2024-07-04 | End: 2024-07-04

## 2024-07-04 RX ORDER — NALOXONE HYDROCHLORIDE 0.4 MG/ML
0.2 INJECTION, SOLUTION INTRAMUSCULAR; INTRAVENOUS; SUBCUTANEOUS
Status: DISCONTINUED | OUTPATIENT
Start: 2024-07-04 | End: 2024-07-06 | Stop reason: HOSPADM

## 2024-07-04 RX ORDER — MISOPROSTOL 200 UG/1
800 TABLET ORAL
Status: DISCONTINUED | OUTPATIENT
Start: 2024-07-04 | End: 2024-07-06 | Stop reason: HOSPADM

## 2024-07-04 RX ORDER — MISOPROSTOL 100 UG/1
25 TABLET ORAL EVERY 4 HOURS PRN
Status: DISCONTINUED | OUTPATIENT
Start: 2024-07-04 | End: 2024-07-06 | Stop reason: HOSPADM

## 2024-07-04 RX ORDER — MISOPROSTOL 200 UG/1
400 TABLET ORAL
Status: DISCONTINUED | OUTPATIENT
Start: 2024-07-04 | End: 2024-07-06 | Stop reason: HOSPADM

## 2024-07-04 RX ORDER — OXYTOCIN 10 [USP'U]/ML
10 INJECTION, SOLUTION INTRAMUSCULAR; INTRAVENOUS
Status: DISCONTINUED | OUTPATIENT
Start: 2024-07-04 | End: 2024-07-08 | Stop reason: HOSPADM

## 2024-07-04 RX ORDER — LIDOCAINE 40 MG/G
CREAM TOPICAL
Status: DISCONTINUED | OUTPATIENT
Start: 2024-07-04 | End: 2024-07-04

## 2024-07-04 RX ORDER — LIDOCAINE 40 MG/G
CREAM TOPICAL
Status: DISCONTINUED | OUTPATIENT
Start: 2024-07-04 | End: 2024-07-06 | Stop reason: HOSPADM

## 2024-07-04 RX ORDER — ONDANSETRON 4 MG/1
4 TABLET, ORALLY DISINTEGRATING ORAL EVERY 6 HOURS PRN
Status: DISCONTINUED | OUTPATIENT
Start: 2024-07-04 | End: 2024-07-06 | Stop reason: HOSPADM

## 2024-07-04 RX ORDER — LOPERAMIDE HCL 2 MG
2 CAPSULE ORAL
Status: DISCONTINUED | OUTPATIENT
Start: 2024-07-04 | End: 2024-07-06 | Stop reason: HOSPADM

## 2024-07-04 RX ORDER — FENTANYL CITRATE-0.9 % NACL/PF 10 MCG/ML
100 PLASTIC BAG, INJECTION (ML) INTRAVENOUS EVERY 5 MIN PRN
Status: DISCONTINUED | OUTPATIENT
Start: 2024-07-04 | End: 2024-07-04

## 2024-07-04 RX ORDER — IBUPROFEN 800 MG/1
800 TABLET, FILM COATED ORAL
Status: COMPLETED | OUTPATIENT
Start: 2024-07-04 | End: 2024-07-07

## 2024-07-04 RX ADMIN — MISOPROSTOL 25 MCG: 100 TABLET ORAL at 15:35

## 2024-07-04 RX ADMIN — MISOPROSTOL 25 MCG: 100 TABLET ORAL at 19:45

## 2024-07-04 ASSESSMENT — ACTIVITIES OF DAILY LIVING (ADL)
ADLS_ACUITY_SCORE: 18

## 2024-07-04 NOTE — PROGRESS NOTES
Data: [facility]: 2024 11:38 AM.  Reason for maternal/fetal assessment is leaking vaginal fluid. Patient reports leakage of clear fluid at around midnight last night. Patient denies uterine contractions, vaginal bleeding, abdominal pain, pelvic pressure, nausea, vomiting, headache, visual disturbances, epigastric or RUQ pain, significant edema. Patient reports fetal movement is normal. Patient is a 41w3d .  Prenatal record reviewed. Pregnancy has been complicated by advanced maternal age (>=34yo) and obesity (pre-pregnancy BMI >=35).    Vital signs wnl. Support person is present.     Action: Verbal consent for EFM. Triage assessment completed.     Response: Patient verbalized agreement with plan. Will contact TONNY Henriquez with update and further orders.

## 2024-07-04 NOTE — H&P
HOSPITAL TRIAGE NOTE  ===================    CHIEF COMPLAINT  ========================  Donna Craven is a 38 year old patient presenting today at 41w3d for evaluation of Leaking vaginal fluid.    Patient's last menstrual period was 2023.  Estimated Date of Delivery: 2024 by LMP, confirmed by first trimester US\..    HPI  ==================   Donna is here with her partner, Silvino, for rule out rupture of membreanes. She has a small gush of fluid around 1am that got her shorts wet and also leaked onto the couch. She has not noticed continued leaking since that time, but admits it has been hard to tell. She has not noted contractions. Baby has been active. Donna was scheduled to come in this evening for induction of labor for post-dates. She is feeling apprehensive about that though, and is uncertain if she wants to follow-through with induction of labor.  CONTRACTIONS: uterine irritability with occasional mild contractions.   ABDOMINAL PAIN: none  FETAL MOVEMENT: active    VAGINAL BLEEDING: none  RUPTURE OF MEMBRANES: uncertain; has some leaking of fluid clear fluid overnight.  PELVIC PAIN: none  OTHER: n/a    REVIEW OF SYSTEMS  =====================  See RN database    HISTORIES  ==============  ALLERGIES:    No Known Allergies  PAST MEDICAL HISTORY  History reviewed. No pertinent past medical history.  PARTNER: Silvino  FAMILY HISTORY  Family History   Problem Relation Age of Onset    Hypertension Mother     Seizure Disorder Father         due to an MVA    No Known Problems Sister     No Known Problems Brother     No Known Problems Brother     No Known Problems Brother     No Known Problems Brother     Emphysema Maternal Grandmother 60        smoker    Hypertension Paternal Grandmother     Suicide Paternal Grandfather     Coronary Artery Disease Maternal Uncle     Coronary Artery Disease Maternal Uncle     Hemochromatosis Maternal Uncle     Bipolar Disorder Cousin     Genetic Disorder Cousin      Genetic Disorder Other     Melanoma No family hx of     Skin Cancer No family hx of      OB HISTORY  OB History    Para Term  AB Living   1 0 0 0 0 0   SAB IAB Ectopic Multiple Live Births   0 0 0 0 0      # Outcome Date GA Lbr Danilo/2nd Weight Sex Type Anes PTL Lv   1 Current                EXAM  ============  /82 (BP Location: Left arm, Patient Position: Semi-Cantrell's, Cuff Size: Adult Regular)   Temp 98.4  F (36.9  C) (Oral)   Resp 16   LMP 2023   Patient Vitals for the past 24 hrs:   BP Temp Temp src Resp   24 1157 135/82 98.4  F (36.9  C) Oral 16     GENERAL APPEARANCE: healthy, alert and no distress  ABDOMEN:  soft, nontender,non-tender between contractions no epigastric pain  NEURO: Denies headache, blurred vision  PSYCH: mentation appears normal. and affect normal/bright    CONTRACTIONS:  Occasional with uterine irritability  mild  FETAL HEART TONES:  135 with moderate variability, accelerations-yes, decels none.  NST: REACTIVE  CST: NOT DONE  EFW:8.5lbs    PELVIC EXAM: 1/ 50%/ Mid/ average/ -3  CHAVEZ SCORE: 4  PRESENTATION: VERTEX  BLOOD: no  DISCHARGE: none    ROM: No  POOLING: not done  Ferning: negative  ROM plus: negative  FLUID: none    LABS:N/A    DIAGNOSIS  ============  41w3d  Advanced maternal age primigravida  Membranes intact    NST: REACTIVE  CST: NOT DONE  Fetal Heart rate tracing: category one    PLAN  ============  Clinically Significant Risk Factors Present on Admission : none               # Drug Induced Platelet Defect: home medication list includes an antiplatelet medication                     Medically Ready for Discharge: Anticipated Today vs 2-4 days if she stays for induction of labor.  -Donna started to have some contractions that she could feel while here. She would like to stay for a while to see if labor is starting.  -Discussed that she has option to stay and start induction of labor now, or come back this evening. She is unsure what  she wants to do. Plan to do some positioning and pelvic work to try to optimize fetal positioning, and reassess in a few hours.      Montez Henriquez CNM

## 2024-07-04 NOTE — PLAN OF CARE
Donna had her first dose of vaginal misoprostol at 1535. Plan is for second dose of vaginal misoprostol at 1930, and recheck cervix before third dose.      She is comfortable and denies pain. Abdomen palpates mild with contraction and soft at rest. C-EFM via [employer] monitor, see flowsheet. She has been up as tolerated in room and in the hallway, using birthing ball for pelvic rocks and lunges, squatting using birthing stool.     Report given and care transferred to ....,RN at .....

## 2024-07-04 NOTE — H&P
ADMIT NOTE  =================  41w3d  Donna Craven is a 38 year old female     with an Patient's last menstrual period was 2023. and Estimated Date of Delivery: 2024 [facility] at 3:22 PM in for induction of labor.  Indication post-dates.   Fetal movement- active  ROM- no   GBS- negative    HPI  ================  Donna came to triage today for rule out rupture of membranes. She was scheduled for induction of labor for post-dates this evening, but was feeling apprehensive, as she strongly desired to have spontaneous labor. Donna started to feel some contractions while she was here, but they have not progressed. After discussion, she is agreeable to stay now, and start post-dates induction of labor.  FOB- is involved, Silvino  Other labor support- n/a    PRENATAL COURSE  =================  Prenatal course was essentially uncomplicated     HISTORIES  ============  No Known Allergies  History reviewed. No pertinent past medical history.  History reviewed. No pertinent surgical history..  Family History   Problem Relation Age of Onset    Hypertension Mother     Seizure Disorder Father         due to an MVA    No Known Problems Sister     No Known Problems Brother     No Known Problems Brother     No Known Problems Brother     No Known Problems Brother     Emphysema Maternal Grandmother 60        smoker    Hypertension Paternal Grandmother     Suicide Paternal Grandfather     Coronary Artery Disease Maternal Uncle     Coronary Artery Disease Maternal Uncle     Hemochromatosis Maternal Uncle     Bipolar Disorder Cousin     Genetic Disorder Cousin     Genetic Disorder Other     Melanoma No family hx of     Skin Cancer No family hx of      Social History     Tobacco Use    Smoking status: Never     Passive exposure: Never    Smokeless tobacco: Never   Substance Use Topics    Alcohol use: Not Currently     Comment: 1-2 per week     OB History    Para Term  AB  Living   1 0 0 0 0 0   SAB IAB Ectopic Multiple Live Births   0 0 0 0 0      # Outcome Date GA Lbr Danilo/2nd Weight Sex Type Anes PTL Lv   1 Current                 LABS:   ===========  Rhogam not indicated  ABO: O  RH: pos  Rubella: immune  RPR: nonreactive  HIV: nonreactive  Lab Results   Component Value Date    HGB 12.6 05/30/2024      Lab Results   Component Value Date    HEPBANG Nonreactive 11/28/2023     GBS: negative  other labs-     ROS  =========  Pt denies significant respiratory, cardiovacular, GI, or muscular/skeletalcomplaints.    See RN data base ROS.     PHYSICAL EXAM:  ===============  Blood pressure 135/82, temperature 98.4  F (36.9  C), temperature source Oral, resp. rate 16, last menstrual period 09/18/2023, not currently breastfeeding.  Patient Vitals for the past 24 hrs:   BP Temp Temp src Resp   07/04/24 1157 135/82 98.4  F (36.9  C) Oral 16       General appearance: comfortable  Neuro: denies headache and visual disturbances  Psych: Mentation normal and bright       Abdomen: gravid, single vertex fetus, non-tender between contractions.  EFW-  8.5 lbs.   CONTACTIONS: Contractions every occasional with uterine irritability.  Palpate: mild  FETAL HEART TONES: baseline 130 with moderate FHR variability and  pos accelerations.  No decelerations present.      PELVIC EXAM: 1/ 50%/ Mid/ average/ -3   CHAVEZ SCORE: 4  BLOODY SHOW: no   ROM: No  FLUID: none  Ferning and ROM plus: negative    ASSESSMENT:  ==============  IUP @ 41w3d in for induction of labor.  Indication Postdates  AMA primigravida in third trimester   Clinically Significant Risk Factors Present on Admission : none               # Drug Induced Platelet Defect: home medication list includes an antiplatelet medication      81mg ASA    NST REACTIVE  CST NOT DONE  Fetal Heart rate tracing Category category one  GBS- negative     PLAN:  ===========  Admit - see IP orders  cervical ripening with vaginal misoprostol   Pain medication per  patient request; options reviewed  Theraputic sleep available if needed  Anticipate    Medically Ready for Discharge: Anticipated in 2-4 Days    Montez Henriquez CNM

## 2024-07-04 NOTE — TELEPHONE ENCOUNTER
Telephone call to Donna to see how she is doing after her ROM early this morning, and when she is planning to come to the [facility]. Left a VM with TONNY cell contact information.  Montez Henriquez CNM

## 2024-07-04 NOTE — PROGRESS NOTES
Donna came into triage to rule out rupture of membrane. ROM plus and ferning test done, which came back negative. However, she was scheduled for IOL tonight for postdates. Donna and Silvino collaboratively made a plan with TONNY Henriquez to stay in and go ahead with induction of labor for postdates.     SVE at 1327 was 1/50/-3/mid/position/medium with nicholson score 4. First dose of vaginal misoprostol administered by the provider at 1535. Maternal VSS. C-EFM via shira, see flowsheet.     Donna is encouraged to ambulate and keep changing positions. Education on labor process provided. She plans to go natural for birth, but not opposed to epidural. Continue to monitor for maternal and fetal well-being and intervene as needed.

## 2024-07-04 NOTE — CONSULTS
"Consult received for Vascular access care.  See LDA for details.  For additional needs place \"Nursing to Consult for Vascular Access\" [id number] order in EPIC.   "

## 2024-07-04 NOTE — PROGRESS NOTES
Cervical Ripening Placement:    Cervical ripening discussed with Donna and Silvino, and vaginal dosing of misoprostol 25mcg ordered.    First dose of 25mcg misoprostol placed by CNM in posterior fornix of the vagina. Donna tolerated the procedure well, and FHR remains category 1.    Montez Henriquez CNM

## 2024-07-04 NOTE — TELEPHONE ENCOUNTER
"OB Triage Call      Is patient's OB/Midwife with the [facility] or [facility]? [facility]- Proceed with triage     Reason for call: rupture of membranes    Assessment: Patient noticed leaking for fluids tonight.  Patient states they were odorless and clear.  Patient denies contractions    Plan: Patient will wait at home for labor to begin and monitor fetal movement. Patient will report to [facility] if she notices any bright red bleeding, unclear fluid, decreased fetal movement or is uncomfortable with waiting at home. Patient verbalized understanding of care advice.      Patient plans to deliver at [facility]    Patient's primary OB Provider is Yaneli Huffman APRN CNM.      Per protocol recommendations Patient to be evaluated in L&D. Patient's primary OB is [facility]. Paged on-call midwife for patient's primary OB clinic (refer to where patient is seen as midwives may go to multiple locations) Debbie Josue to call FNA back at [phone].  Call returned at 0120 and advised on triage assessment. Does midwife recommend L&D evaluation? No- Per midwife on-call Debbie Josue.       Is patient's delivering hospital on divert? No      41w3d    Estimated Date of Delivery: 2024        OB History    Para Term  AB Living   1 0 0 0 0 0   SAB IAB Ectopic Multiple Live Births   0 0 0 0 0      # Outcome Date GA Lbr Danilo/2nd Weight Sex Type Anes PTL Lv   1 Current                No results found for: \"GBS\"       Myah Love RN   Reason for Disposition   Leakage of fluid from vagina  (Exception: Patient is uncertain, but thinks it might be urine incontinence.)    Additional Information   Negative: [1] SEVERE abdominal pain (e.g., excruciating) AND [2] constant   Negative: SEVERE bleeding (e.g., continuous red blood from vagina, or large blood clots)   Negative: Umbilical cord hanging out of the vagina (shiny, white, curled appearance, \"like telephone cord\")   " Negative: Uncontrollable urge to push (i.e., feels like baby is coming out now)   Negative: Can see baby   Negative: Sounds like a life-threatening emergency to the triager   Negative: < 20 weeks pregnant   Negative: Vaginal bleeding    Protocols used: Pregnancy - Rupture of Membranes [id number]

## 2024-07-04 NOTE — ANESTHESIA PREPROCEDURE EVALUATION
"Anesthesia Pre-Procedure Evaluation    Patient: Donna Craven   MRN: [id number] : 1985        Procedure :           History reviewed. No pertinent past medical history.   History reviewed. No pertinent surgical history.   No Known Allergies   Social History     Tobacco Use    Smoking status: Never     Passive exposure: Never    Smokeless tobacco: Never   Substance Use Topics    Alcohol use: Not Currently     Comment: 1-2 per week      Wt Readings from Last 1 Encounters:   24 96.1 kg (211 lb 12.8 oz)        Anesthesia Evaluation            ROS/MED HX  ENT/Pulmonary:  - neg pulmonary ROS     Neurologic:  - neg neurologic ROS     Cardiovascular:  - neg cardiovascular ROS     METS/Exercise Tolerance:     Hematologic:  - neg hematologic  ROS     Musculoskeletal:       GI/Hepatic:  - neg GI/hepatic ROS     Renal/Genitourinary:       Endo:  - neg endo ROS     Psychiatric/Substance Use:  - neg psychiatric ROS     Infectious Disease:       Malignancy:       Other:   Current Pregnancy  41w3d         Physical Exam    Airway        Mallampati: II   TM distance: > 3 FB   Neck ROM: full   Mouth opening: > 3 cm    Respiratory Devices and Support         Dental  no notable dental history         Cardiovascular   cardiovascular exam normal          Pulmonary   pulmonary exam normal                OUTSIDE LABS:  CBC:   Lab Results   Component Value Date    WBC 7.0 2023    HGB 12.6 2024    HGB 12.1 2024    HCT 36.6 2023     2023     BMP: No results found for: \"NA\", \"POTASSIUM\", \"CHLORIDE\", \"CO2\", \"BUN\", \"CR\", \"GLC\"  COAGS: No results found for: \"PTT\", \"INR\", \"FIBR\"  POC: No results found for: \"BGM\", \"HCG\", \"HCGS\"  HEPATIC: No results found for: \"ALBUMIN\", \"PROTTOTAL\", \"ALT\", \"AST\", \"GGT\", \"ALKPHOS\", \"BILITOTAL\", \"BILIDIRECT\", \"JULIANO\"  OTHER: No results found for: \"PH\", \"LACT\", \"A1C\", \"EDUARDO\", \"PHOS\", \"MAG\", \"LIPASE\", \"AMYLASE\", \"TSH\", \"T4\", \"T3\", \"CRP\", \"SED\"    Anesthesia " Plan    ASA Status:  2       Anesthesia Type: Epidural.              Consents    Anesthesia Plan(s) and associated risks, benefits, and realistic alternatives discussed. Questions answered and patient/representative(s) expressed understanding.     - Discussed:     - Discussed with:  Patient            Postoperative Care            Comments:    Other Comments: Patient currently planning for unmedicated birth but will let us know if she wishes to discuss epidural for analgesia as she progresses. We also briefly discussed spinal anesthesia and general anesthesia in the setting of  section           Rachid Moore MD    I have reviewed the pertinent notes and labs in the chart from the past 30 days and (re)examined the patient.  Any updates or changes from those notes are reflected in this note.             # Drug Induced Platelet Defect: home medication list includes an antiplatelet medication

## 2024-07-05 LAB
CREAT SERPL-MCNC: 0.7 MG/DL (ref 0.51–0.95)
EGFRCR SERPLBLD CKD-EPI 2021: >90 ML/MIN/1.73M2
ERYTHROCYTE [DISTWIDTH] IN BLOOD BY AUTOMATED COUNT: 12.9 % (ref 10–15)
HCT VFR BLD AUTO: 35.6 % (ref 35–47)
HGB BLD-MCNC: 12.4 G/DL (ref 11.7–15.7)
MCH RBC QN AUTO: 32.9 PG (ref 26.5–33)
MCHC RBC AUTO-ENTMCNC: 34.8 G/DL (ref 31.5–36.5)
MCV RBC AUTO: 94 FL (ref 78–100)
PLATELET # BLD AUTO: 161 10E3/UL (ref 150–450)
RBC # BLD AUTO: 3.77 10E6/UL (ref 3.8–5.2)
T PALLIDUM AB SER QL: NONREACTIVE
WBC # BLD AUTO: 19 10E3/UL (ref 4–11)

## 2024-07-05 PROCEDURE — 85027 COMPLETE CBC AUTOMATED: CPT | Performed by: ADVANCED PRACTICE MIDWIFE

## 2024-07-05 PROCEDURE — 82565 ASSAY OF CREATININE: CPT | Performed by: ADVANCED PRACTICE MIDWIFE

## 2024-07-05 PROCEDURE — 120N000002 HC R&B MED SURG/OB UMMC

## 2024-07-05 PROCEDURE — 250N000011 HC RX IP 250 OP 636: Performed by: ADVANCED PRACTICE MIDWIFE

## 2024-07-05 PROCEDURE — 3E0R3BZ INTRODUCTION OF ANESTHETIC AGENT INTO SPINAL CANAL, PERCUTANEOUS APPROACH: ICD-10-PCS | Performed by: ANESTHESIOLOGY

## 2024-07-05 PROCEDURE — 250N000009 HC RX 250: Performed by: ADVANCED PRACTICE MIDWIFE

## 2024-07-05 PROCEDURE — 258N000003 HC RX IP 258 OP 636: Performed by: ADVANCED PRACTICE MIDWIFE

## 2024-07-05 PROCEDURE — 00HU33Z INSERTION OF INFUSION DEVICE INTO SPINAL CANAL, PERCUTANEOUS APPROACH: ICD-10-PCS | Performed by: ANESTHESIOLOGY

## 2024-07-05 PROCEDURE — 250N000011 HC RX IP 250 OP 636

## 2024-07-05 PROCEDURE — 3E033VJ INTRODUCTION OF OTHER HORMONE INTO PERIPHERAL VEIN, PERCUTANEOUS APPROACH: ICD-10-PCS | Performed by: ADVANCED PRACTICE MIDWIFE

## 2024-07-05 PROCEDURE — 250N000013 HC RX MED GY IP 250 OP 250 PS 637: Performed by: ADVANCED PRACTICE MIDWIFE

## 2024-07-05 PROCEDURE — 3E0E37Z INTRODUCTION OF ELECTROLYTIC AND WATER BALANCE SUBSTANCE INTO PRODUCTS OF CONCEPTION, PERCUTANEOUS APPROACH: ICD-10-PCS | Performed by: ADVANCED PRACTICE MIDWIFE

## 2024-07-05 PROCEDURE — 36415 COLL VENOUS BLD VENIPUNCTURE: CPT | Performed by: ADVANCED PRACTICE MIDWIFE

## 2024-07-05 RX ORDER — CALCIUM CARBONATE 500 MG/1
500 TABLET, CHEWABLE ORAL DAILY PRN
Status: DISCONTINUED | OUTPATIENT
Start: 2024-07-05 | End: 2024-07-08 | Stop reason: HOSPADM

## 2024-07-05 RX ORDER — AMPICILLIN 2 G/1
2 INJECTION, POWDER, FOR SOLUTION INTRAVENOUS EVERY 6 HOURS
Status: DISCONTINUED | OUTPATIENT
Start: 2024-07-05 | End: 2024-07-06 | Stop reason: HOSPADM

## 2024-07-05 RX ORDER — SODIUM CHLORIDE, SODIUM LACTATE, POTASSIUM CHLORIDE, CALCIUM CHLORIDE 600; 310; 30; 20 MG/100ML; MG/100ML; MG/100ML; MG/100ML
INJECTION, SOLUTION INTRAVENOUS CONTINUOUS
Status: DISCONTINUED | OUTPATIENT
Start: 2024-07-05 | End: 2024-07-08 | Stop reason: HOSPADM

## 2024-07-05 RX ORDER — LIDOCAINE HYDROCHLORIDE 10 MG/ML
INJECTION, SOLUTION EPIDURAL; INFILTRATION; INTRACAUDAL; PERINEURAL
Status: DISCONTINUED
Start: 2024-07-05 | End: 2024-07-05 | Stop reason: HOSPADM

## 2024-07-05 RX ORDER — OXYTOCIN/0.9 % SODIUM CHLORIDE 30/500 ML
PLASTIC BAG, INJECTION (ML) INTRAVENOUS
Status: COMPLETED
Start: 2024-07-05 | End: 2024-07-05

## 2024-07-05 RX ORDER — OXYTOCIN 10 [USP'U]/ML
INJECTION, SOLUTION INTRAMUSCULAR; INTRAVENOUS
Status: DISCONTINUED
Start: 2024-07-05 | End: 2024-07-05 | Stop reason: HOSPADM

## 2024-07-05 RX ORDER — ACETAMINOPHEN 325 MG/1
975 TABLET ORAL EVERY 6 HOURS
Status: DISCONTINUED | OUTPATIENT
Start: 2024-07-06 | End: 2024-07-06 | Stop reason: HOSPADM

## 2024-07-05 RX ORDER — FENTANYL CITRATE-0.9 % NACL/PF 10 MCG/ML
100 PLASTIC BAG, INJECTION (ML) INTRAVENOUS EVERY 5 MIN PRN
Status: DISCONTINUED | OUTPATIENT
Start: 2024-07-05 | End: 2024-07-06 | Stop reason: HOSPADM

## 2024-07-05 RX ORDER — LIDOCAINE 40 MG/G
CREAM TOPICAL
Status: DISCONTINUED | OUTPATIENT
Start: 2024-07-05 | End: 2024-07-06 | Stop reason: HOSPADM

## 2024-07-05 RX ORDER — SODIUM CHLORIDE, SODIUM LACTATE, POTASSIUM CHLORIDE, CALCIUM CHLORIDE 600; 310; 30; 20 MG/100ML; MG/100ML; MG/100ML; MG/100ML
INJECTION, SOLUTION INTRAVENOUS CONTINUOUS PRN
Status: DISCONTINUED | OUTPATIENT
Start: 2024-07-05 | End: 2024-07-06 | Stop reason: HOSPADM

## 2024-07-05 RX ORDER — NALBUPHINE HYDROCHLORIDE 20 MG/ML
2.5-5 INJECTION, SOLUTION INTRAMUSCULAR; INTRAVENOUS; SUBCUTANEOUS EVERY 6 HOURS PRN
Status: DISCONTINUED | OUTPATIENT
Start: 2024-07-05 | End: 2024-07-08 | Stop reason: HOSPADM

## 2024-07-05 RX ORDER — SODIUM CHLORIDE 9 MG/ML
INJECTION, SOLUTION INTRAVENOUS CONTINUOUS
Status: DISCONTINUED | OUTPATIENT
Start: 2024-07-05 | End: 2024-07-06 | Stop reason: HOSPADM

## 2024-07-05 RX ORDER — OXYTOCIN/0.9 % SODIUM CHLORIDE 30/500 ML
1-24 PLASTIC BAG, INJECTION (ML) INTRAVENOUS CONTINUOUS
Status: DISCONTINUED | OUTPATIENT
Start: 2024-07-05 | End: 2024-07-06 | Stop reason: HOSPADM

## 2024-07-05 RX ORDER — MISOPROSTOL 200 UG/1
TABLET ORAL
Status: DISCONTINUED
Start: 2024-07-05 | End: 2024-07-05 | Stop reason: HOSPADM

## 2024-07-05 RX ORDER — SODIUM CHLORIDE 9 MG/ML
INJECTION, SOLUTION INTRAVENOUS
Status: COMPLETED
Start: 2024-07-05 | End: 2024-07-05

## 2024-07-05 RX ORDER — FENTANYL/ROPIVACAINE/NS/PF 2MCG/ML-.1
PLASTIC BAG, INJECTION (ML) EPIDURAL
Status: DISCONTINUED | OUTPATIENT
Start: 2024-07-05 | End: 2024-07-06 | Stop reason: HOSPADM

## 2024-07-05 RX ADMIN — NALBUPHINE HYDROCHLORIDE 2.6 MG: 20 INJECTION, SOLUTION INTRAMUSCULAR; INTRAVENOUS; SUBCUTANEOUS at 19:54

## 2024-07-05 RX ADMIN — Medication 250 ML: at 18:50

## 2024-07-05 RX ADMIN — SODIUM CHLORIDE, POTASSIUM CHLORIDE, SODIUM LACTATE AND CALCIUM CHLORIDE: 600; 310; 30; 20 INJECTION, SOLUTION INTRAVENOUS at 18:54

## 2024-07-05 RX ADMIN — Medication: at 05:38

## 2024-07-05 RX ADMIN — ACETAMINOPHEN 650 MG: 325 TABLET, FILM COATED ORAL at 19:54

## 2024-07-05 RX ADMIN — AMPICILLIN SODIUM 2 G: 2 INJECTION, POWDER, FOR SOLUTION INTRAMUSCULAR; INTRAVENOUS at 21:36

## 2024-07-05 RX ADMIN — NALBUPHINE HYDROCHLORIDE 2.6 MG: 20 INJECTION, SOLUTION INTRAMUSCULAR; INTRAVENOUS; SUBCUTANEOUS at 13:28

## 2024-07-05 RX ADMIN — Medication 2 MILLI-UNITS/MIN: at 10:30

## 2024-07-05 RX ADMIN — Medication: at 19:41

## 2024-07-05 RX ADMIN — FENTANYL CITRATE 100 MCG: 50 INJECTION INTRAMUSCULAR; INTRAVENOUS at 03:59

## 2024-07-05 RX ADMIN — Medication 5 ML: at 15:26

## 2024-07-05 RX ADMIN — ONDANSETRON 4 MG: 2 INJECTION INTRAMUSCULAR; INTRAVENOUS at 01:09

## 2024-07-05 RX ADMIN — METOCLOPRAMIDE HYDROCHLORIDE 10 MG: 5 INJECTION INTRAMUSCULAR; INTRAVENOUS at 02:31

## 2024-07-05 RX ADMIN — FENTANYL CITRATE 100 MCG: 50 INJECTION INTRAMUSCULAR; INTRAVENOUS at 04:59

## 2024-07-05 RX ADMIN — SODIUM CHLORIDE 250 ML: 9 INJECTION, SOLUTION INTRAVENOUS at 18:50

## 2024-07-05 RX ADMIN — SODIUM CHLORIDE, POTASSIUM CHLORIDE, SODIUM LACTATE AND CALCIUM CHLORIDE: 600; 310; 30; 20 INJECTION, SOLUTION INTRAVENOUS at 10:47

## 2024-07-05 RX ADMIN — SODIUM CHLORIDE, POTASSIUM CHLORIDE, SODIUM LACTATE AND CALCIUM CHLORIDE 1000 ML: 600; 310; 30; 20 INJECTION, SOLUTION INTRAVENOUS at 04:47

## 2024-07-05 RX ADMIN — GENTAMICIN SULFATE 140 MG: 40 INJECTION, SOLUTION INTRAMUSCULAR; INTRAVENOUS at 20:35

## 2024-07-05 RX ADMIN — CALCIUM CARBONATE (ANTACID) CHEW TAB 500 MG 500 MG: 500 CHEW TAB at 13:55

## 2024-07-05 ASSESSMENT — ACTIVITIES OF DAILY LIVING (ADL)
ADLS_ACUITY_SCORE: 25
ADLS_ACUITY_SCORE: 19
ADLS_ACUITY_SCORE: 18
ADLS_ACUITY_SCORE: 19
ADLS_ACUITY_SCORE: 18
ADLS_ACUITY_SCORE: 19
ADLS_ACUITY_SCORE: 18
ADLS_ACUITY_SCORE: 18
ADLS_ACUITY_SCORE: 19
ADLS_ACUITY_SCORE: 18
ADLS_ACUITY_SCORE: 23
ADLS_ACUITY_SCORE: 25
ADLS_ACUITY_SCORE: 19
ADLS_ACUITY_SCORE: 19
ADLS_ACUITY_SCORE: 18
ADLS_ACUITY_SCORE: 18

## 2024-07-05 NOTE — ANESTHESIA PROCEDURE NOTES
Epidural catheter Procedure Note    Pre-Procedure   Staff -        Anesthesiologist:  Vivien Garces MD       Resident/Fellow: Hema Swartz MD       Performed By: resident and with residents       Procedure performed by resident/fellow/CRNA in presence of a teaching physician.         Location: OB       Pre-Anesthestic Checklist: patient identified, IV checked, risks and benefits discussed, informed consent, monitors and equipment checked, pre-op evaluation, at physician/surgeon's request and post-op pain management  Timeout:       Correct Patient: Yes        Correct Procedure: Yes        Correct Site: Yes        Correct Position: Yes   Procedure Documentation  Procedure: epidural catheter       Diagnosis: labor analgesia       Patient Position: sitting       Patient Prep/Sterile Barriers: sterile gloves, mask, patient draped       Skin prep: Chloraprep       Local skin infiltrated with mL of 2% lidocaine.        Insertion Site: L4-5. (midline approach).       Technique: LORT saline        LAMAR at 7 cm.       Needle Type: Touhy needle       Needle Gauge: 17.        Needle Length (Inches): 3.5        Catheter: 19 G.          Catheter threaded easily.         4 cm epidural space.         Threaded 11 cm at skin.         # of attempts: 1 and  # of redirects:  1    Assessment/Narrative         Paresthesias: No.       Test dose of 3 mL lidocaine 1.5% w/ 1:200,000 epinephrine at 05:31 CDT.         Test dose negative, 3 minutes after injection, for signs of intravascular, subdural, or intrathecal injection.       Insertion/Infusion Method: LORT saline       Aspiration negative for Heme or CSF via Epidural Catheter.     Comments:  Routine labor epidural placement without complications. Negative aspiration but upon administration of test dose, significant resistance to administration of medication. Catheter withdrew 0.5cm and alligator connector changed with slight improvement in resistance. Able to give hand  "bolus      FOR [facility] ONLY:   Pain Team Contact information: please page the Pain Team Via [facility]. Search \"Pain\". During daytime hours, please page the attending first. At night please page the resident first.      "

## 2024-07-05 NOTE — PLAN OF CARE
Patient epidural replaced this AM following return of pain and epidural pump erroring with occlusion that was not resolved.     Epidural is now successful throughout shift, however at approx 1400 patient reports having break through pain enough to cause her to need to breathe through contractions. Patient is using her button for additional bolus's.     Patient has had VSS throughout shift. Moderate ranges noted during secondary epidural placement. Return to baseline following epidural. Patient  denies headache, vision changes, epigastric pain/chest pain.     Patient was moved through [facility]'s circuit to promote optimal fetal positioning. Following circuit, patient has been utilizing side ex.runners, and at time of this note placed in throne.     Patient received 500 ml LR bolus due to minimal variability and recurring early and variable decelerations.     MVU's at time of note are noted >200 with pitocin at 12mu. Patient endorses slight bottom pressure but states has not changed since earlier in shift.     Call light within reach. Patient advised to call with any changes or concerns.

## 2024-07-05 NOTE — PLAN OF CARE
VSS and afebrile. Patient tolerating labor well.  Utilizing labor positions, birth-ball and other labor support items. Patient denies headaches, vision changes, epigastric pain, and bleeding. See flow sheets for FHR and contraction pattern.  Support person is at bedside.  Will continue to monitor and notify provider if change of status.  Anticipate . Report given to Dc GONZALES RN.

## 2024-07-05 NOTE — PROVIDER NOTIFICATION
07/05/24 0308   Provider Notification   Provider Name/Title CNCECILIA Henriquez   Method of Notification Phone   Request Evaluate in Person   Notification Reason SVE     Notified CNM that patient requesting SVE to check progress.

## 2024-07-05 NOTE — PLAN OF CARE
VSS on room air, afebrile. Had two vaginal miso prior to 11pm, now labouring on her own with good contraction pattern, see flowsheet.  FHT moderate with accels most of night but minimal period after epidural, see flowsheet. Repositioned, now getting better variability. Comfortable with epidural, however epidural may need periodic rebolusing due to apparent flaw in catheter making it difficult for pump to push medication. Denies pre-e symptoms. Meconium stained fluids, ruptured.

## 2024-07-05 NOTE — PROGRESS NOTES
S:Donna is resting in bed with the peanut ball. She is feeling more discomfort with contractions now. Also notes bloody show now. Silvino is at the bedside and supportive.    O:  Blood pressure 127/79, temperature 99.4  F (37.4  C), temperature source Oral, resp. rate 18, last menstrual period 2023, not currently breastfeeding.  Patient Vitals for the past 24 hrs:   BP Temp Temp src Resp   24 2315 -- 99.4  F (37.4  C) Oral 18   24 2230 127/79 98.9  F (37.2  C) Oral 16   24 2130 -- 98.8  F (37.1  C) Oral --   24 1930 137/84 98.5  F (36.9  C) Oral 16   24 1538 122/77 98.5  F (36.9  C) Oral 16   24 1157 135/82 98.4  F (36.9  C) Oral 16       General appearance: comfortable    CONTACTIONS: Contractions every 2-5 minutes.  Palpate: strong  FETAL HEART TONES: baseline 120 with moderate FHR variability and    accelerations yes. Decelerations no.    ROM: light meconium fluid   PELVIC EXAM:deferred    Bloody show: Yes   Pitocin- none,  Antibiotics- none  Cervical ripening: s/p misoprostol    LABOR COURSE:  -admit  1327: 1/50/-3/mid/med; nicholson 4  1535: vag miso #1  2030: attempted urias balloon placement; AROM for moderate amount of light meconium fluid    ASSESSMENT:  ==============  IUP @ 41w4d early labor   Induction of labor for post-dates  AMA primigravida  Fetal Heart rate tracing category one  GBS- negative  Clinically Significant Risk Factors Present on Admission : none       # Drug Induced Platelet Defect: home medication list includes an antiplatelet medication      81mg ASA    PLAN:  ===========  Pain medication per patient request; options have been reviewed.  Chantel too much for further intervention at this time. Discussed SVE if contractions space or progress stalls. Given early ROM, will hold off on SVE for now.  Anticipate      Medically Ready for Discharge: Anticipated in 2-4 Days      Montez Henriquez CNM

## 2024-07-05 NOTE — PROGRESS NOTES
S:  Donna is comfortable and hoping to rest in side lying position.     O:  /58   Temp 98.8  F (37.1  C)   Resp 18   LMP 09/18/2023   SpO2 100%   Patient Vitals for the past 24 hrs:   BP Temp Temp src Resp SpO2   07/05/24 1114 115/58 98.8  F (37.1  C) -- -- 100 %   07/05/24 1030 123/73 -- -- -- 99 %   07/05/24 1000 107/57 98.8  F (37.1  C) Oral -- 98 %   07/05/24 0930 -- -- -- -- 100 %   07/05/24 0924 110/63 -- -- -- 99 %   07/05/24 0915 132/76 -- -- -- 95 %   07/05/24 0908 -- 98.8  F (37.1  C) Oral -- --   07/05/24 0905 138/89 -- -- -- 95 %   07/05/24 0901 136/84 -- -- -- --   07/05/24 0900 -- -- -- -- 98 %   07/05/24 0856 -- -- -- -- 94 %   07/05/24 0855 -- -- -- -- 96 %   07/05/24 0853 125/79 -- -- -- --   07/05/24 0851 131/82 -- -- -- --   07/05/24 0850 -- -- -- -- 98 %   07/05/24 0849 134/83 -- -- -- --   07/05/24 0847 131/87 -- -- -- --   07/05/24 0845 124/84 -- -- -- 100 %   07/05/24 0843 122/85 -- -- -- --   07/05/24 0841 123/86 -- -- -- --   07/05/24 0840 -- -- -- -- 100 %   07/05/24 0839 126/83 -- -- -- --   07/05/24 0837 (!) 141/83 -- -- -- --   07/05/24 0835 (!) 148/84 -- -- -- 100 %   07/05/24 0833 137/80 -- -- -- --   07/05/24 0830 -- -- -- -- 97 %   07/05/24 0829 131/83 -- -- -- --   07/05/24 0825 -- -- -- -- 100 %   07/05/24 0820 -- -- -- -- 99 %   07/05/24 0815 (!) 143/75 -- -- -- 100 %   07/05/24 0810 -- -- -- -- 98 %   07/05/24 0805 -- -- -- -- 99 %   07/05/24 0800 138/65 -- -- -- 98 %   07/05/24 0730 128/75 -- -- -- 100 %   07/05/24 0715 131/75 98.5  F (36.9  C) -- 18 98 %   07/05/24 0700 -- -- -- -- 100 %   07/05/24 0655 129/73 -- -- 20 100 %   07/05/24 0640 118/59 -- -- 18 100 %   07/05/24 0635 123/60 -- -- 18 100 %   07/05/24 0630 137/82 -- -- 18 98 %   07/05/24 0625 132/81 98.7  F (37.1  C) Oral 18 98 %   07/05/24 0620 134/85 -- -- 18 98 %   07/05/24 0615 128/82 -- -- 18 97 %   07/05/24 0605 126/85 -- -- 18 98 %   07/05/24 0600 132/85 -- -- 20 99 %   07/05/24 0555 136/84 -- -- 20  98 %   24 0550 134/78 -- -- 20 100 %   24 0541 (!) 142/80 98  F (36.7  C) Oral 18 --   24 0540 (!) 142/65 -- -- 20 100 %   24 0537 (!) 150/74 -- -- 20 --   24 0535 (!) 141/86 -- -- 20 100 %   24 0533 139/76 -- -- 20 --   24 0532 (!) 151/84 -- -- 20 --   24 0515 128/75 -- -- 18 98 %   24 0358 -- 97.5  F (36.4  C) Oral -- --   24 0250 133/78 -- -- 20 --   24 0230 -- 97.7  F (36.5  C) Oral -- --   24 0100 -- 98.4  F (36.9  C) Oral -- --   24 0000 -- 98.7  F (37.1  C) Oral -- --   24 2315 -- 99.4  F (37.4  C) Oral 18 --   24 2230 127/79 98.9  F (37.2  C) Oral 16 --   24 2130 -- 98.8  F (37.1  C) Oral -- --   24 1930 137/84 98.5  F (36.9  C) Oral 16 --   24 1538 122/77 98.5  F (36.9  C) Oral 16 --     FHT: baseline: 135; variability: moderate; accels: none; decels: None; ctx: q 2-3 min, MVU range from 185-240 over last 30 minutes  Pitocin at 12mu/min  SVE deferred, fetus is JOHANN by Leopold's    Labor Course:  -admit  1327: 1/50/-3/mid/med; nicholson 4  1535: vag miso #1  2030: attempted urias balloon placement; AROM for moderate amount of light meconium fluid    0316: SVE 5/100/-2  0530: epidural placement  0742 SVE unchanged, 5/100/-2, fetus LOP by Leopold's  0840 epidural replaced  0850 IUPC inserted  1030 Pitocin started       A:   at 41w4d, here for IOL for postdates pregnancy  Labor status: active labor   GBS neg  Fetus Cat I    P:  Continue titrating Pitocin  Plan SVE when ctx have been adequate x 2-3 hours  Medically Ready for Discharge: Anticipated in 2-4 Days        Yaneli Huffman, EDUARDA MARLOWM

## 2024-07-05 NOTE — PROGRESS NOTES
S:Donna is getting comfortable now after epidural placement. She is able to relax and rest. She has had some mild range BPs over the last 20 min, but no other signs/symptoms of preeclampsia.     O:  Blood pressure (!) 142/80, temperature 98  F (36.7  C), temperature source Oral, resp. rate 18, last menstrual period 09/18/2023, SpO2 100%, not currently breastfeeding.  Patient Vitals for the past 24 hrs:   BP Temp Temp src Resp SpO2   07/05/24 0541 (!) 142/80 98  F (36.7  C) Oral 18 --   07/05/24 0540 (!) 142/65 -- -- 20 100 %   07/05/24 0537 (!) 150/74 -- -- 20 --   07/05/24 0535 (!) 141/86 -- -- 20 100 %   07/05/24 0533 139/76 -- -- 20 --   07/05/24 0532 (!) 151/84 -- -- 20 --   07/05/24 0358 -- 97.5  F (36.4  C) Oral -- --   07/05/24 0250 133/78 -- -- 20 --   07/05/24 0230 -- 97.7  F (36.5  C) Oral -- --   07/05/24 0100 -- 98.4  F (36.9  C) Oral -- --   07/05/24 0000 -- 98.7  F (37.1  C) Oral -- --   07/04/24 2315 -- 99.4  F (37.4  C) Oral 18 --   07/04/24 2230 127/79 98.9  F (37.2  C) Oral 16 --   07/04/24 2130 -- 98.8  F (37.1  C) Oral -- --   07/04/24 1930 137/84 98.5  F (36.9  C) Oral 16 --   07/04/24 1538 122/77 98.5  F (36.9  C) Oral 16 --   07/04/24 1157 135/82 98.4  F (36.9  C) Oral 16 --       General appearance: comfortable    CONTACTIONS: Contractions every 2-3 minutes.  Palpate: strong  FETAL HEART TONES: baseline 130 with minimal FHR variability and    accelerations no. Decelerations yes-early.    ROM: light meconium fluid   PELVIC EXAM:deferred  Fetal Position:  cephalic  Bloody show: Yes   Pitocin- none,  Antibiotics- none    LABOR COURSE:  7/4-admit  1327: 1/50/-3/mid/med; nicholson 4  1535: vag miso #1  2030: attempted urias balloon placement; AROM for moderate amount of light meconium fluid  7/5  0316: SVE 5/100/-2  0530: epidural placement    ASSESSMENT:  ==============  IUP @ 41w4d active labor   Fetal Heart rate tracing category two-reposition off back as soon as able.  GBS-  negative    PLAN:  ===========  comfort measures prn   Pain medication-continue epidural per anesthesia orders  Anticipate   reevaluate in 2-4 hours/PRN     Medically Ready for Discharge: Anticipated in 2-4 Days      Montez Henriquez CNM

## 2024-07-05 NOTE — PROGRESS NOTES
S:Called to patient room around 0300 with request for SVE. Donna is feeling very uncomfortable and restless. Would like SVE to check her progress, and get an idea about where she is in labor. Continues to leak mec stained fluid, and having some bloody show. Silvino is with her providing care and support.    O:  Blood pressure 133/78, temperature 97.5  F (36.4  C), temperature source Oral, resp. rate 20, last menstrual period 09/18/2023, not currently breastfeeding.  Patient Vitals for the past 24 hrs:   BP Temp Temp src Resp   07/05/24 0358 -- 97.5  F (36.4  C) Oral --   07/05/24 0250 133/78 -- -- 20   07/05/24 0230 -- 97.7  F (36.5  C) Oral --   07/05/24 0100 -- 98.4  F (36.9  C) Oral --   07/05/24 0000 -- 98.7  F (37.1  C) Oral --   07/04/24 2315 -- 99.4  F (37.4  C) Oral 18   07/04/24 2230 127/79 98.9  F (37.2  C) Oral 16   07/04/24 2130 -- 98.8  F (37.1  C) Oral --   07/04/24 1930 137/84 98.5  F (36.9  C) Oral 16   07/04/24 1538 122/77 98.5  F (36.9  C) Oral 16   07/04/24 1157 135/82 98.4  F (36.9  C) Oral 16       General appearance: uncomfortable with contractions    CONTACTIONS: Contractions every 2-4 minutes.  Palpate: strong  FETAL HEART TONES: baseline 125 with moderate FHR variability and    accelerations yes. Decelerations yes-few variable.      ROM: light meconium fluid   PELVIC EXAM:PELVIC EXAM: 5/ 100%/ Anterior/ soft/ -2   Fetal Position:  cephalic  Bloody show: Yes   Pitocin- none,  Antibiotics- none  Cervical ripening: s/p misoprostol    LABOR COURSE:  7/4-admit  1327: 1/50/-3/mid/med; nicholson 4  1535: vag miso #1  2030: attempted urias balloon placement; AROM for moderate amount of light meconium fluid  7/5  0316: SVE 5/100/-2    ASSESSMENT:  ==============  IUP @ 41w4d early labor and good progress   Fetal Heart rate tracing category two-can rule out fetal acidemia with moderate variability and accelerations  GBS- negative  Clinically Significant Risk Factors Present on Admission :  none    PLAN:  ===========  comfort measures prn   Pain medication per patient request. Tried nitrous oxide and now a dose of IV fentanyl. Anesthesia notified that patient may want an epidural soon.  Anticipate   reevaluate in 2-4 hours/PRN     Medically Ready for Discharge: Anticipated in 2-4 Days      Montez Henriquez CNM

## 2024-07-05 NOTE — PROGRESS NOTES
Category II Fetal Tracing   Category II Algorithm    S:   I was notified by labor RN of persistent Category II fetal heart rate tracing for 1 hour and 20 minutes minutes.    Strip reviewed on unit with RN and charge RN, then discussed at bedside with patient.    O:   Vitals:   Vitals:    07/05/24 1330 07/05/24 1350 07/05/24 1453 07/05/24 1519   BP:  136/75 129/74    BP Location:  Right arm     Patient Position:  Semi-Cantrell's     Cuff Size:  Adult Regular     Pulse:  99     Resp:  22     Temp:  98.8  F (37.1  C) 99.5  F (37.5  C) 98.8  F (37.1  C)   TempSrc:  Oral Oral Oral   SpO2: 100% 100%       Fetal Baseline Rate: 140, normal  Variability: minimal  Accelerations: not present   Decelerations: present -  early.       Deceleration frequency: recurrent (occurring 50% or more)       Are the decelerations significant? {  No.  Uterine Activity: every 2-3 minutes    Interventions to improve fetal oxygenation for a Category II tracing include : Category II algorithm reviewed, evaluate labor progress, IV fluid bolus , oxytocin discontinued, and sterile vaginal exam.     A:   Persistent Category II tracing.     P:   Per the Category II algorithm, the plan is to continue to observe and apply intrauterine resuscitation measures as indicated.  Reevaluate in 45 min.    EDUARDA Bartholomew CNM

## 2024-07-05 NOTE — PROVIDER NOTIFICATION
07/05/24 0708   Provider Notification   Provider Name/Title CNM Montez Henriquez   Method of Notification Electronic Page   Request Evaluate - Remote   Notification Reason Variability Change     Notified CNM of minimal variability, giving bolus.

## 2024-07-05 NOTE — PROGRESS NOTES
S:  Donna has not gotten adequate relief with epidural. She is pausing with contractions to focus and breathe through them.     Discussed no cervical change and recommendation for IUPC and Pitocin. She agrees with plan. Will also use position changes to assist with fetal positioning.    O:  /75   Temp 98.5  F (36.9  C)   Resp 18   LMP 09/18/2023   SpO2 100%   Patient Vitals for the past 24 hrs:   BP Temp Temp src Resp SpO2   07/05/24 0730 128/75 -- -- -- 100 %   07/05/24 0715 131/75 98.5  F (36.9  C) -- 18 98 %   07/05/24 0700 -- -- -- -- 100 %   07/05/24 0655 129/73 -- -- 20 100 %   07/05/24 0640 118/59 -- -- 18 100 %   07/05/24 0635 123/60 -- -- 18 100 %   07/05/24 0630 137/82 -- -- 18 98 %   07/05/24 0625 132/81 98.7  F (37.1  C) Oral 18 98 %   07/05/24 0620 134/85 -- -- 18 98 %   07/05/24 0615 128/82 -- -- 18 97 %   07/05/24 0605 126/85 -- -- 18 98 %   07/05/24 0600 132/85 -- -- 20 99 %   07/05/24 0555 136/84 -- -- 20 98 %   07/05/24 0550 134/78 -- -- 20 100 %   07/05/24 0541 (!) 142/80 98  F (36.7  C) Oral 18 --   07/05/24 0540 (!) 142/65 -- -- 20 100 %   07/05/24 0537 (!) 150/74 -- -- 20 --   07/05/24 0535 (!) 141/86 -- -- 20 100 %   07/05/24 0533 139/76 -- -- 20 --   07/05/24 0532 (!) 151/84 -- -- 20 --   07/05/24 0515 128/75 -- -- 18 98 %   07/05/24 0358 -- 97.5  F (36.4  C) Oral -- --   07/05/24 0250 133/78 -- -- 20 --   07/05/24 0230 -- 97.7  F (36.5  C) Oral -- --   07/05/24 0100 -- 98.4  F (36.9  C) Oral -- --   07/05/24 0000 -- 98.7  F (37.1  C) Oral -- --   07/04/24 2315 -- 99.4  F (37.4  C) Oral 18 --   07/04/24 2230 127/79 98.9  F (37.2  C) Oral 16 --   07/04/24 2130 -- 98.8  F (37.1  C) Oral -- --   07/04/24 1930 137/84 98.5  F (36.9  C) Oral 16 --   07/04/24 1538 122/77 98.5  F (36.9  C) Oral 16 --   07/04/24 1157 135/82 98.4  F (36.9  C) Oral 16 --     FHT: baseline: 130; variability: moderate; accels: none; decels: None; ctx: q 2-3 min  SVE 5/100/-2  @.ipvitals    Labor  Course:  -admit  1327: 1/50/-3/mid/med; nicholson 4  1535: vag miso #1  2030: attempted urias balloon placement; AROM for moderate amount of light meconium fluid    0316: SVE 5/100/-2  0530: epidural placement  0742 SVE unchanged, 5100/-2, fetus LOP by Leopold's    A:   at 41w4d, here for IOL for post dates pregnancy  Labor status: active labor with little cervical change  GBS neg  Fetus Cat I    P:  Plan epidural replacement  Plan IUPC and then Pitocin titration  Medically Ready for Discharge: Anticipated in 2-4 Days      EDUARDA Bartholomew CNM

## 2024-07-05 NOTE — PROGRESS NOTES
S:  Donna has been doing position changes through [employer] method to assist with optimal fetal position. In puppy pose right now. Her epidural is working well now.    O:  /73   Temp 98.8  F (37.1  C) (Oral)   Resp 18   LMP 09/18/2023   SpO2 99%   Patient Vitals for the past 24 hrs:   BP Temp Temp src Resp SpO2   07/05/24 1030 123/73 -- -- -- 99 %   07/05/24 1000 107/57 -- -- -- 98 %   07/05/24 0930 -- -- -- -- 100 %   07/05/24 0924 110/63 -- -- -- 99 %   07/05/24 0915 132/76 -- -- -- 95 %   07/05/24 0908 -- 98.8  F (37.1  C) Oral -- --   07/05/24 0905 138/89 -- -- -- 95 %   07/05/24 0901 136/84 -- -- -- --   07/05/24 0900 -- -- -- -- 98 %   07/05/24 0856 -- -- -- -- 94 %   07/05/24 0855 -- -- -- -- 96 %   07/05/24 0853 125/79 -- -- -- --   07/05/24 0851 131/82 -- -- -- --   07/05/24 0850 -- -- -- -- 98 %   07/05/24 0849 134/83 -- -- -- --   07/05/24 0847 131/87 -- -- -- --   07/05/24 0845 124/84 -- -- -- 100 %   07/05/24 0843 122/85 -- -- -- --   07/05/24 0841 123/86 -- -- -- --   07/05/24 0840 -- -- -- -- 100 %   07/05/24 0839 126/83 -- -- -- --   07/05/24 0837 (!) 141/83 -- -- -- --   07/05/24 0835 (!) 148/84 -- -- -- 100 %   07/05/24 0833 137/80 -- -- -- --   07/05/24 0830 -- -- -- -- 97 %   07/05/24 0829 131/83 -- -- -- --   07/05/24 0825 -- -- -- -- 100 %   07/05/24 0820 -- -- -- -- 99 %   07/05/24 0815 (!) 143/75 -- -- -- 100 %   07/05/24 0810 -- -- -- -- 98 %   07/05/24 0805 -- -- -- -- 99 %   07/05/24 0800 138/65 -- -- -- 98 %   07/05/24 0730 128/75 -- -- -- 100 %   07/05/24 0715 131/75 98.5  F (36.9  C) -- 18 98 %   07/05/24 0700 -- -- -- -- 100 %   07/05/24 0655 129/73 -- -- 20 100 %   07/05/24 0640 118/59 -- -- 18 100 %   07/05/24 0635 123/60 -- -- 18 100 %   07/05/24 0630 137/82 -- -- 18 98 %   07/05/24 0625 132/81 98.7  F (37.1  C) Oral 18 98 %   07/05/24 0620 134/85 -- -- 18 98 %   07/05/24 0615 128/82 -- -- 18 97 %   07/05/24 0605 126/85 -- -- 18 98 %   07/05/24 0600 132/85 -- -- 20  99 %   24 0555 136/84 -- -- 20 98 %   24 0550 134/78 -- -- 20 100 %   24 0541 (!) 142/80 98  F (36.7  C) Oral 18 --   24 0540 (!) 142/65 -- -- 20 100 %   24 0537 (!) 150/74 -- -- 20 --   24 0535 (!) 141/86 -- -- 20 100 %   24 0533 139/76 -- -- 20 --   24 0532 (!) 151/84 -- -- 20 --   24 0515 128/75 -- -- 18 98 %   24 0358 -- 97.5  F (36.4  C) Oral -- --   24 0250 133/78 -- -- 20 --   24 0230 -- 97.7  F (36.5  C) Oral -- --   24 0100 -- 98.4  F (36.9  C) Oral -- --   24 0000 -- 98.7  F (37.1  C) Oral -- --   24 2315 -- 99.4  F (37.4  C) Oral 18 --   24 2230 127/79 98.9  F (37.2  C) Oral 16 --   24 2130 -- 98.8  F (37.1  C) Oral -- --   24 1930 137/84 98.5  F (36.9  C) Oral 16 --   24 1538 122/77 98.5  F (36.9  C) Oral 16 --   24 1157 135/82 98.4  F (36.9  C) Oral 16 --     FHT: baseline: 13-0; variability: moderate; accels: none; decels: None; ctx: q 2-3,   Pitocin at 4mu/min  SVE deferred    Labor Course:  -admit  1327: 1/50/-3/mid/med; nicholson 4  1535: vag miso #1  2030: attempted urias balloon placement; AROM for moderate amount of light meconium fluid    0316: SVE 5/100/-2  0530: epidural placement  0742 SVE unchanged, 5/100/-2, fetus LOP by Leopold's  0840 epidural replaced  0850 IUPC inserted  1030 Pitocin started    A:   at 41w4d, here for IOL for postdates pregnancy  Labor status: active labor with little cervical change and inadequate ctx  GBS neg  Fetus Cat I    P:  Continue titrating Pitocin  Continue position changes    Medically Ready for Discharge: Anticipated in 2-4 Days    EDUARDA Bartholomew CNM

## 2024-07-05 NOTE — PROGRESS NOTES
S:Donna is doing well, up in her room trying different positions and alternating activity and rest. She doesn't feel pain with her contractions, only the tightness. Silvino is with her and supportive.    O:  Blood pressure 122/77, temperature 98.5  F (36.9  C), temperature source Oral, resp. rate 16, last menstrual period 2023, not currently breastfeeding.  Patient Vitals for the past 24 hrs:   BP Temp Temp src Resp   24 1538 122/77 98.5  F (36.9  C) Oral 16   24 1157 135/82 98.4  F (36.9  C) Oral 16       General appearance: comfortable    CONTACTIONS: Contractions every 1-8 minutes.  Palpate: mild  FETAL HEART TONES: baseline 125 with moderate FHR variability and    accelerations yes. Decelerations no.    ROM: not ruptured  PELVIC EXAM:deferred    Bloody show: No  Pitocin- none,  Antibiotics- none  Cervical ripening: misoprostol    LABOR COURSE:  -admit  1327: 1/50/-3/mid/med; nicholson 4  1535: vag miso #1    ASSESSMENT:  ==============  IUP @ 41w3d IOL for postdates   AMA primigravida in third trimester  Fetal Heart rate tracing category one  GBS- negative  Clinically Significant Risk Factors Present on Admission : none     # Drug Induced Platelet Defect: home medication list includes an antiplatelet medication    81mg ASA     PLAN:  ===========  comfort measures prn   cervical ripening with misoprostol-administer second dose when scheduled. Plan to recheck SVE prior to third dose.  Consider urias bulb placement before third dose  Anticipate   reevaluate in 2-4 hours/PRN     Medically Ready for Discharge: Anticipated in 2-4 Days    Montez Henriquez CNM

## 2024-07-05 NOTE — PLAN OF CARE
Provider notification:  Provider Name: LILLY JARAMILLO  TONNY. Notified at 1506 regarding a persistent category II fetal heart rate tracing for (SINCE 1346 ) 80 minutes.   Baseline rate normal  Variability minimal  Accelerations not present  Decelerations present  Type of deceleration early, deceleration frequency: recurrent (occurring 50% or more).   Are the decelerations significant?   No    Deceleration frequency intermittent. Are the decelerations significant?   No      EFM interpretation suggests concern for fetal metabolic acidemia at this time due to decelerations present early, deceleration frequency: recurrent (occurring 50% or more).   Are the decelerations significant?   No   late , deceleration frequency: recurrent (occurring 50% or more).   Are the decelerations significant?   No   and variability: minimal    Uterine Activity normal/regular.    Interventions to improve fetal oxygenation for a category II tracing include:maternal positioning, IV fluid bolus , discontinue oxytocin , increase frequency of assessment, consult Category 2 algorithm, evaluate labor progress, sterile vaginal exam, blood pressure check, and emotional support    After discussion with provider:Plan reassessment in 30 minutes

## 2024-07-05 NOTE — PROGRESS NOTES
S:Donna is feeling tightness with her contractions. She is agreeable to attempt urias balloon placement for mechanical cervical ripening.     O:  Blood pressure 137/84, temperature 98.5  F (36.9  C), temperature source Oral, resp. rate 16, last menstrual period 09/18/2023, not currently breastfeeding.  Patient Vitals for the past 24 hrs:   BP Temp Temp src Resp   07/04/24 1930 137/84 98.5  F (36.9  C) Oral 16   07/04/24 1538 122/77 98.5  F (36.9  C) Oral 16   07/04/24 1157 135/82 98.4  F (36.9  C) Oral 16       General appearance: comfortable    CONTACTIONS: Contractions every 1-17 minutes.  Palpate: mild  FETAL HEART TONES: baseline 120 with moderate FHR variability and    accelerations yes. Decelerations yes-few variable.      ROM: light meconium fluid   PELVIC EXAM:PELVIC EXAM: 2/ 70/ Anterior/ soft/ -2   Fetal Position:  cephalic  Bloody show: No  Pitocin- none,  Antibiotics- none  Cervical ripening: misoprostol    LABOR COURSE:  7/4-admit  1327: 1/50/-3/mid/med; nicholson 4  1535: vag miso #1  2030: attempted urias balloon placement; AROM for moderate amount of light meconium fluid    URIAS BALLOON PLACEMENT:  Patient positioned in lithotomy position with bilateral legs in stirrups. SVE completed and urias catheter was easily introduced through the cervical os. Stilette removed, and catheter advanced easily. 50mL of sterile water instilled into the balloon, and prior to placing the final 25mL, got return of moderate amount of light meconium fluid through urias catheter tubing. Fluid removed from the urias, and catheter was removed. Fetal head well applied to cervix after procedure. Donna tolerated the procedure well, and FHR remained category 1 after the procedure was complete.     ASSESSMENT:  ==============  IUP @ 41w3d IOL for postdates   Fetal Heart rate tracing category two; can rule out fetal acidemia due to moderate variability and accelerations  GBS- negative    PLAN:  ===========  comfort measures prn    cervical ripening with misoprostol; consider switching to pitocin 4 hours after last dose  Pain medication per patient request  Anticipate   reevaluate in 2-4 hours/PRN     Medically Ready for Discharge: Anticipated in 2-4 Days      Montez Henriquez CNM

## 2024-07-05 NOTE — PROGRESS NOTES
S:  Donna is able to get some rest on and off. Endorses mild pressure in pelvic intermittently.    O:  /66   Pulse 89   Temp (!) 101  F (38.3  C) (Oral)   Resp 24   LMP 09/18/2023   SpO2 98%   Patient Vitals for the past 24 hrs:   BP Temp Temp src Pulse Resp SpO2   07/05/24 1737 133/66 (!) 101  F (38.3  C) Oral -- -- 98 %   07/05/24 1650 125/63 99.4  F (37.4  C) Oral 89 24 98 %   07/05/24 1630 -- 99.4  F (37.4  C) -- -- -- --   07/05/24 1519 -- 98.8  F (37.1  C) Oral -- -- --   07/05/24 1453 129/74 99.5  F (37.5  C) Oral -- -- --   07/05/24 1350 136/75 98.8  F (37.1  C) Oral 99 22 100 %   07/05/24 1330 -- -- -- -- -- 100 %   07/05/24 1240 111/59 98.8  F (37.1  C) Oral 88 22 100 %   07/05/24 1137 117/65 98.8  F (37.1  C) Oral 86 24 --   07/05/24 1114 115/58 98.8  F (37.1  C) -- -- -- 100 %   07/05/24 1030 123/73 -- -- -- -- 99 %   07/05/24 1000 107/57 98.8  F (37.1  C) Oral -- -- 98 %   07/05/24 0930 -- -- -- -- -- 100 %   07/05/24 0924 110/63 -- -- -- -- 99 %   07/05/24 0915 132/76 -- -- -- -- 95 %   07/05/24 0908 -- 98.8  F (37.1  C) Oral -- -- --   07/05/24 0905 138/89 -- -- -- -- 95 %   07/05/24 0901 136/84 -- -- -- -- --   07/05/24 0900 -- -- -- -- -- 98 %   07/05/24 0856 -- -- -- -- -- 94 %   07/05/24 0855 -- -- -- -- -- 96 %   07/05/24 0853 125/79 -- -- -- -- --   07/05/24 0851 131/82 -- -- -- -- --   07/05/24 0850 -- -- -- -- -- 98 %   07/05/24 0849 134/83 -- -- -- -- --   07/05/24 0847 131/87 -- -- -- -- --   07/05/24 0845 124/84 -- -- -- -- 100 %   07/05/24 0843 122/85 -- -- -- -- --   07/05/24 0841 123/86 -- -- -- -- --   07/05/24 0840 -- -- -- -- -- 100 %   07/05/24 0839 126/83 -- -- -- -- --   07/05/24 0837 (!) 141/83 -- -- -- -- --   07/05/24 0835 (!) 148/84 -- -- -- -- 100 %   07/05/24 0833 137/80 -- -- -- -- --   07/05/24 0830 -- -- -- -- -- 97 %   07/05/24 0829 131/83 -- -- -- -- --   07/05/24 0825 -- -- -- -- -- 100 %   07/05/24 0820 -- -- -- -- -- 99 %   07/05/24 0815 (!) 143/75 -- --  -- -- 100 %   07/05/24 0810 -- -- -- -- -- 98 %   07/05/24 0805 -- -- -- -- -- 99 %   07/05/24 0800 138/65 -- -- -- -- 98 %   07/05/24 0730 128/75 -- -- -- -- 100 %   07/05/24 0715 131/75 98.5  F (36.9  C) -- -- 18 98 %   07/05/24 0700 -- -- -- -- -- 100 %   07/05/24 0655 129/73 -- -- -- 20 100 %   07/05/24 0640 118/59 -- -- -- 18 100 %   07/05/24 0635 123/60 -- -- -- 18 100 %   07/05/24 0630 137/82 -- -- -- 18 98 %   07/05/24 0625 132/81 98.7  F (37.1  C) Oral -- 18 98 %   07/05/24 0620 134/85 -- -- -- 18 98 %   07/05/24 0615 128/82 -- -- -- 18 97 %   07/05/24 0605 126/85 -- -- -- 18 98 %   07/05/24 0600 132/85 -- -- -- 20 99 %   07/05/24 0555 136/84 -- -- -- 20 98 %   07/05/24 0550 134/78 -- -- -- 20 100 %   07/05/24 0541 (!) 142/80 98  F (36.7  C) Oral -- 18 --   07/05/24 0540 (!) 142/65 -- -- -- 20 100 %   07/05/24 0537 (!) 150/74 -- -- -- 20 --   07/05/24 0535 (!) 141/86 -- -- -- 20 100 %   07/05/24 0533 139/76 -- -- -- 20 --   07/05/24 0532 (!) 151/84 -- -- -- 20 --   07/05/24 0515 128/75 -- -- -- 18 98 %   07/05/24 0358 -- 97.5  F (36.4  C) Oral -- -- --   07/05/24 0250 133/78 -- -- -- 20 --   07/05/24 0230 -- 97.7  F (36.5  C) Oral -- -- --   07/05/24 0100 -- 98.4  F (36.9  C) Oral -- -- --   07/05/24 0000 -- 98.7  F (37.1  C) Oral -- -- --   07/04/24 2315 -- 99.4  F (37.4  C) Oral -- 18 --   07/04/24 2230 127/79 98.9  F (37.2  C) Oral -- 16 --   07/04/24 2130 -- 98.8  F (37.1  C) Oral -- -- --   07/04/24 1930 137/84 98.5  F (36.9  C) Oral -- 16 --     FHT: baseline: 145; variability: moderate with periods of minimal; accels: yes; decels: intermittent early, late, and variable decels; ctx: q 2-3 min, MVU range from 155 to 185  Pitocin at 10mu/min  SVE deferred    Labor Course:  7/4-admit  1327: 1/50/-3/mid/med; nicholson 4  1535: vag miso #1  2030: attempted urias balloon placement; AROM for moderate amount of light meconium fluid  7/5  0316: SVE 5/100/-2  0530: epidural placement  0742 SVE unchanged, 5/100/-2,  fetus LOP by Leopold's  0840 epidural replaced  0850 IUPC inserted  1030 Pitocin started  1345 Cat II FHT x 80 minutes with minimal variability, Pitocin stopped at 1514  1542 Pitocin restarted at half rate after Cat I FHT      A:   at 41w4d, here for IOL for postdates pregnancy  Labor status: active labor   GBS neg  Fetus Cat II with no acute concern for fetal distress due to moderate variability and accels     P:  Continue following Cat II FHT [place] algorithm with huddles as needed with team  Continue titrating Pitocin until adequate MVU as able    Medically Ready for Discharge: Anticipated in 2-4 Days      EDUARDA Bartholomew CNM

## 2024-07-05 NOTE — PROVIDER NOTIFICATION
07/05/24 0729   Epidural Placement Care   Epidural Request/Placement provider notified for redose  (rq bolus)     Paged anesthesia resident to give bolus per pt request due to regaining pain with contractions

## 2024-07-05 NOTE — PROVIDER NOTIFICATION
07/05/24 1813   Provider Notification   Provider Name/Title LILLY GORDON   Method of Notification In Department   Request Evaluate - Remote   Notification Reason Labor Status     Discussed with CNM regarding <60 seconds resting tone between contractions. CNM noted starting to space and request to monitor for additional 10 minutes prior to changing pitocin. Noted contractions space

## 2024-07-05 NOTE — PROVIDER NOTIFICATION
07/05/24 0745   Epidural Placement Care   Epidural Request/Placement provider called for placement;provider notified for redose     Rqt anesthesia to replace cath due to pump not administering any medication due to occlusion error. Resident acknowledged and states they will come to replace.

## 2024-07-05 NOTE — ANESTHESIA PROCEDURE NOTES
"Epidural catheter Procedure Note    Pre-Procedure   Staff -        Anesthesiologist:  Janice Tan MD       Resident/Fellow: Amisha Boland MD       Performed By: resident and anesthesiologist       Location: OB       Procedure Start/Stop Times: 7/5/2024 8:15 AM and 7/5/2024 8:40 AM       Pre-Anesthestic Checklist: patient identified, IV checked, risks and benefits discussed, informed consent, monitors and equipment checked, pre-op evaluation, at physician/surgeon's request and post-op pain management  Timeout:       Correct Patient: Yes        Correct Procedure: Yes        Correct Site: Yes        Correct Position: Yes   Procedure Documentation  Procedure: epidural catheter       Diagnosis: labor analgesia       Patient Position: sitting       Patient Prep/Sterile Barriers: sterile gloves, mask, patient draped       Skin prep: Chloraprep       Local skin infiltrated with mL of 1% lidocaine.        Insertion Site: L3-4, L2-3. (midline approach).       Technique: LORT saline        LAMAR at 5.5 cm.       Needle Type: ToHouseTripy needle       Needle Gauge: 17.        Needle Length (Inches): 3.5        Catheter: 20 G.          Catheter threaded easily.         4.5 cm epidural space.         Threaded 10 cm at skin.         # of attempts: 2 and  # of redirects:  2    Assessment/Narrative         Paresthesias: No.       Test dose of 3 mL lidocaine 1.5% w/ 1:200,000 epinephrine at.         Test dose negative, 3 minutes after injection, for signs of intravascular, subdural, or intrathecal injection.       Insertion/Infusion Method: LORT saline       Aspiration negative for Heme or CSF via Epidural Catheter.    Medication(s) Administered   Medication Administration Time: 7/5/2024 8:15 AM      FOR [facility] ([facility]) ONLY:   Pain Team Contact information: please page the Pain Team Via [employer]. Search \"Pain\". During daytime hours, please page the attending first. At night please page the resident first.      "

## 2024-07-06 PROBLEM — D62 ANEMIA DUE TO BLOOD LOSS, ACUTE: Status: ACTIVE | Noted: 2024-07-06

## 2024-07-06 PROBLEM — Z23 ENCOUNTER FOR IMMUNIZATION: Status: RESOLVED | Noted: 2023-10-24 | Resolved: 2024-07-06

## 2024-07-06 PROBLEM — O09.513 AMA (ADVANCED MATERNAL AGE) PRIMIGRAVIDA 35+, THIRD TRIMESTER: Status: RESOLVED | Noted: 2024-07-04 | Resolved: 2024-07-06

## 2024-07-06 PROBLEM — Z36.89 ENCOUNTER FOR TRIAGE IN PREGNANT PATIENT: Status: RESOLVED | Noted: 2024-07-04 | Resolved: 2024-07-06

## 2024-07-06 LAB — HGB BLD-MCNC: 10.8 G/DL (ref 11.7–15.7)

## 2024-07-06 PROCEDURE — 85018 HEMOGLOBIN: CPT | Performed by: ADVANCED PRACTICE MIDWIFE

## 2024-07-06 PROCEDURE — 120N000002 HC R&B MED SURG/OB UMMC

## 2024-07-06 PROCEDURE — 250N000013 HC RX MED GY IP 250 OP 250 PS 637: Performed by: ADVANCED PRACTICE MIDWIFE

## 2024-07-06 PROCEDURE — 59400 OBSTETRICAL CARE: CPT | Performed by: ADVANCED PRACTICE MIDWIFE

## 2024-07-06 PROCEDURE — 258N000003 HC RX IP 258 OP 636: Performed by: ADVANCED PRACTICE MIDWIFE

## 2024-07-06 PROCEDURE — 250N000009 HC RX 250: Performed by: ADVANCED PRACTICE MIDWIFE

## 2024-07-06 PROCEDURE — 250N000011 HC RX IP 250 OP 636: Performed by: ADVANCED PRACTICE MIDWIFE

## 2024-07-06 PROCEDURE — 999N000016 HC STATISTIC ATTENDANCE AT DELIVERY

## 2024-07-06 PROCEDURE — 0KQM0ZZ REPAIR PERINEUM MUSCLE, OPEN APPROACH: ICD-10-PCS | Performed by: ADVANCED PRACTICE MIDWIFE

## 2024-07-06 PROCEDURE — 722N000001 HC LABOR CARE VAGINAL DELIVERY SINGLE

## 2024-07-06 PROCEDURE — 36415 COLL VENOUS BLD VENIPUNCTURE: CPT | Performed by: ADVANCED PRACTICE MIDWIFE

## 2024-07-06 RX ORDER — CARBOPROST TROMETHAMINE 250 UG/ML
250 INJECTION, SOLUTION INTRAMUSCULAR
Status: DISCONTINUED | OUTPATIENT
Start: 2024-07-06 | End: 2024-07-08 | Stop reason: HOSPADM

## 2024-07-06 RX ORDER — IBUPROFEN 800 MG/1
800 TABLET, FILM COATED ORAL EVERY 6 HOURS PRN
Status: DISCONTINUED | OUTPATIENT
Start: 2024-07-06 | End: 2024-07-08 | Stop reason: HOSPADM

## 2024-07-06 RX ORDER — OXYTOCIN 10 [USP'U]/ML
10 INJECTION, SOLUTION INTRAMUSCULAR; INTRAVENOUS
Status: DISCONTINUED | OUTPATIENT
Start: 2024-07-06 | End: 2024-07-08 | Stop reason: HOSPADM

## 2024-07-06 RX ORDER — DOCUSATE SODIUM 100 MG/1
100 CAPSULE, LIQUID FILLED ORAL DAILY
Status: DISCONTINUED | OUTPATIENT
Start: 2024-07-06 | End: 2024-07-08 | Stop reason: HOSPADM

## 2024-07-06 RX ORDER — AMOXICILLIN 250 MG
1 CAPSULE ORAL DAILY
COMMUNITY
Start: 2024-07-06 | End: 2024-08-05

## 2024-07-06 RX ORDER — LOPERAMIDE HCL 2 MG
2 CAPSULE ORAL
Status: DISCONTINUED | OUTPATIENT
Start: 2024-07-06 | End: 2024-07-08 | Stop reason: HOSPADM

## 2024-07-06 RX ORDER — MISOPROSTOL 200 UG/1
800 TABLET ORAL
Status: DISCONTINUED | OUTPATIENT
Start: 2024-07-06 | End: 2024-07-08 | Stop reason: HOSPADM

## 2024-07-06 RX ORDER — IBUPROFEN 600 MG/1
600 TABLET, FILM COATED ORAL EVERY 6 HOURS PRN
COMMUNITY
Start: 2024-07-06 | End: 2024-08-05

## 2024-07-06 RX ORDER — TRANEXAMIC ACID 10 MG/ML
1 INJECTION, SOLUTION INTRAVENOUS EVERY 30 MIN PRN
Status: DISCONTINUED | OUTPATIENT
Start: 2024-07-06 | End: 2024-07-08 | Stop reason: HOSPADM

## 2024-07-06 RX ORDER — MODIFIED LANOLIN
OINTMENT (GRAM) TOPICAL
Status: DISCONTINUED | OUTPATIENT
Start: 2024-07-06 | End: 2024-07-08 | Stop reason: HOSPADM

## 2024-07-06 RX ORDER — METHYLERGONOVINE MALEATE 0.2 MG/ML
200 INJECTION INTRAVENOUS
Status: DISCONTINUED | OUTPATIENT
Start: 2024-07-06 | End: 2024-07-08 | Stop reason: HOSPADM

## 2024-07-06 RX ORDER — HYDROCORTISONE 25 MG/G
CREAM TOPICAL 3 TIMES DAILY PRN
Status: DISCONTINUED | OUTPATIENT
Start: 2024-07-06 | End: 2024-07-08 | Stop reason: HOSPADM

## 2024-07-06 RX ORDER — ACETAMINOPHEN 325 MG/1
650 TABLET ORAL EVERY 4 HOURS PRN
Status: DISCONTINUED | OUTPATIENT
Start: 2024-07-06 | End: 2024-07-08 | Stop reason: HOSPADM

## 2024-07-06 RX ORDER — ACETAMINOPHEN 325 MG/1
650 TABLET ORAL EVERY 6 HOURS PRN
COMMUNITY
Start: 2024-07-06 | End: 2024-08-05

## 2024-07-06 RX ORDER — OXYTOCIN/0.9 % SODIUM CHLORIDE 30/500 ML
340 PLASTIC BAG, INJECTION (ML) INTRAVENOUS CONTINUOUS PRN
Status: DISCONTINUED | OUTPATIENT
Start: 2024-07-06 | End: 2024-07-08 | Stop reason: HOSPADM

## 2024-07-06 RX ORDER — MISOPROSTOL 200 UG/1
400 TABLET ORAL
Status: DISCONTINUED | OUTPATIENT
Start: 2024-07-06 | End: 2024-07-08 | Stop reason: HOSPADM

## 2024-07-06 RX ORDER — BISACODYL 10 MG
10 SUPPOSITORY, RECTAL RECTAL DAILY PRN
Status: DISCONTINUED | OUTPATIENT
Start: 2024-07-06 | End: 2024-07-08 | Stop reason: HOSPADM

## 2024-07-06 RX ORDER — LOPERAMIDE HCL 2 MG
4 CAPSULE ORAL
Status: DISCONTINUED | OUTPATIENT
Start: 2024-07-06 | End: 2024-07-08 | Stop reason: HOSPADM

## 2024-07-06 RX ADMIN — IBUPROFEN 800 MG: 800 TABLET, FILM COATED ORAL at 18:24

## 2024-07-06 RX ADMIN — DOCUSATE SODIUM 100 MG: 100 CAPSULE, LIQUID FILLED ORAL at 09:56

## 2024-07-06 RX ADMIN — ACETAMINOPHEN 650 MG: 325 TABLET, FILM COATED ORAL at 05:40

## 2024-07-06 RX ADMIN — IBUPROFEN 800 MG: 800 TABLET, FILM COATED ORAL at 05:40

## 2024-07-06 RX ADMIN — FENTANYL CITRATE 100 MCG: 50 INJECTION INTRAMUSCULAR; INTRAVENOUS at 02:52

## 2024-07-06 RX ADMIN — ACETAMINOPHEN 650 MG: 325 TABLET, FILM COATED ORAL at 18:24

## 2024-07-06 RX ADMIN — ACETAMINOPHEN 650 MG: 325 TABLET, FILM COATED ORAL at 13:32

## 2024-07-06 RX ADMIN — ACETAMINOPHEN 650 MG: 325 TABLET, FILM COATED ORAL at 09:56

## 2024-07-06 RX ADMIN — SODIUM CHLORIDE, POTASSIUM CHLORIDE, SODIUM LACTATE AND CALCIUM CHLORIDE 500 ML: 600; 310; 30; 20 INJECTION, SOLUTION INTRAVENOUS at 01:35

## 2024-07-06 RX ADMIN — LIDOCAINE HYDROCHLORIDE 20 ML: 10 INJECTION, SOLUTION EPIDURAL; INFILTRATION; INTRACAUDAL; PERINEURAL at 03:03

## 2024-07-06 RX ADMIN — IBUPROFEN 800 MG: 800 TABLET, FILM COATED ORAL at 12:20

## 2024-07-06 ASSESSMENT — ACTIVITIES OF DAILY LIVING (ADL)
ADLS_ACUITY_SCORE: 19
ADLS_ACUITY_SCORE: 25
ADLS_ACUITY_SCORE: 25
ADLS_ACUITY_SCORE: 19
ADLS_ACUITY_SCORE: 24
ADLS_ACUITY_SCORE: 19
ADLS_ACUITY_SCORE: 24
ADLS_ACUITY_SCORE: 24
ADLS_ACUITY_SCORE: 25
ADLS_ACUITY_SCORE: 19
ADLS_ACUITY_SCORE: 24
ADLS_ACUITY_SCORE: 19
ADLS_ACUITY_SCORE: 25
ADLS_ACUITY_SCORE: 19

## 2024-07-06 NOTE — PLAN OF CARE
"Patient continued with frequent position changes throughout shift. Patient noted to have sustained fever and CNM notified.     Patient was included with strip review and notes verbal understanding of importance for position changes.     Amnioinfusion bolus completed and noted no vaginal return. CNM notified of this.     Patient and spouse state they are exhausted but have high spirits but are \"taking it one thing at a time\" regarding recurrent decelerations.     Patient denies being in pain at this time and states her epidural is effective, she is coping with labor.     Patient denies headache, chest pain/epigastric pain, vision changes.     Noted continued pink vaginal bleeding/bloody show.    CAT 2 huddles performed throughout remainder of shift including charge RN and CNM. See CAT 2 note.      Verbal Report given to ALEXIA Swain.   "

## 2024-07-06 NOTE — PROGRESS NOTES
S:  Donna is happy to have made some cervical change.    O:  /67 (BP Location: Right arm, Patient Position: Semi-Cantrell's, Cuff Size: Adult Regular)   Pulse 116   Temp 99.6  F (37.6  C) (Oral)   Resp 24   LMP 09/18/2023   SpO2 97%   Patient Vitals for the past 24 hrs:   BP Temp Temp src Pulse Resp SpO2   07/05/24 2158 -- 99.6  F (37.6  C) Oral -- -- --   07/05/24 2051 -- -- -- -- -- 97 %   07/05/24 2046 -- -- -- -- -- 97 %   07/05/24 2041 -- -- -- -- -- 97 %   07/05/24 2038 -- 100  F (37.8  C) Oral -- -- --   07/05/24 2036 -- -- -- -- -- 97 %   07/05/24 2031 -- -- -- -- -- 98 %   07/05/24 2026 -- -- -- -- -- 98 %   07/05/24 2021 -- -- -- -- -- 99 %   07/05/24 2016 -- -- -- -- -- 97 %   07/05/24 2011 -- -- -- -- -- 95 %   07/05/24 2005 -- -- -- -- -- 96 %   07/05/24 2000 -- 98.9  F (37.2  C) Oral -- -- 97 %   07/05/24 1955 -- -- -- -- -- 98 %   07/05/24 1950 -- -- -- -- -- 98 %   07/05/24 1945 -- -- -- -- -- 97 %   07/05/24 1940 -- -- -- -- -- 96 %   07/05/24 1935 -- -- -- -- -- 94 %   07/05/24 1930 -- -- -- -- -- 95 %   07/05/24 1925 -- -- -- -- -- 99 %   07/05/24 1920 -- -- -- -- -- 98 %   07/05/24 1915 -- -- -- -- -- 97 %   07/05/24 1910 -- -- -- -- -- 96 %   07/05/24 1905 -- -- -- -- -- 97 %   07/05/24 1900 -- -- -- -- -- 98 %   07/05/24 1835 131/67 (!) 100.7  F (38.2  C) Oral 116 24 98 %   07/05/24 1813 -- 100.3  F (37.9  C) Oral -- -- --   07/05/24 1737 133/66 (!) 101  F (38.3  C) Oral -- -- 98 %   07/05/24 1650 125/63 99.4  F (37.4  C) Oral 89 24 98 %   07/05/24 1630 -- 99.4  F (37.4  C) -- -- -- --   07/05/24 1519 -- 98.8  F (37.1  C) Oral -- -- --   07/05/24 1453 129/74 99.5  F (37.5  C) Oral -- -- --   07/05/24 1350 136/75 98.8  F (37.1  C) Oral 99 22 100 %   07/05/24 1330 -- -- -- -- -- 100 %   07/05/24 1240 111/59 98.8  F (37.1  C) Oral 88 22 100 %   07/05/24 1137 117/65 98.8  F (37.1  C) Oral 86 24 --   07/05/24 1114 115/58 98.8  F (37.1  C) -- -- -- 100 %   07/05/24 1030 123/73 -- -- -- --  99 %   07/05/24 1000 107/57 98.8  F (37.1  C) Oral -- -- 98 %   07/05/24 0930 -- -- -- -- -- 100 %   07/05/24 0924 110/63 -- -- -- -- 99 %   07/05/24 0915 132/76 -- -- -- -- 95 %   07/05/24 0908 -- 98.8  F (37.1  C) Oral -- -- --   07/05/24 0905 138/89 -- -- -- -- 95 %   07/05/24 0901 136/84 98.8  F (37.1  C) Oral -- -- --   07/05/24 0900 -- -- -- -- -- 98 %   07/05/24 0856 -- -- -- -- -- 94 %   07/05/24 0855 -- -- -- -- -- 96 %   07/05/24 0853 125/79 -- -- -- -- --   07/05/24 0851 131/82 -- -- -- -- --   07/05/24 0850 -- -- -- -- -- 98 %   07/05/24 0849 134/83 -- -- -- -- --   07/05/24 0847 131/87 -- -- -- -- --   07/05/24 0845 124/84 -- -- -- -- 100 %   07/05/24 0843 122/85 -- -- -- -- --   07/05/24 0841 123/86 -- -- -- -- --   07/05/24 0840 -- -- -- -- -- 100 %   07/05/24 0839 126/83 -- -- -- -- --   07/05/24 0837 (!) 141/83 -- -- -- -- --   07/05/24 0835 (!) 148/84 -- -- -- -- 100 %   07/05/24 0833 137/80 -- -- -- -- --   07/05/24 0830 -- -- -- -- -- 97 %   07/05/24 0829 131/83 -- -- -- -- --   07/05/24 0825 -- -- -- -- -- 100 %   07/05/24 0820 -- -- -- -- -- 99 %   07/05/24 0815 (!) 143/75 -- -- -- -- 100 %   07/05/24 0810 -- -- -- -- -- 98 %   07/05/24 0805 -- -- -- -- -- 99 %   07/05/24 0800 138/65 -- -- -- -- 98 %   07/05/24 0730 128/75 -- -- -- -- 100 %   07/05/24 0715 131/75 98.5  F (36.9  C) -- -- 18 98 %   07/05/24 0700 -- -- -- -- -- 100 %   07/05/24 0655 129/73 -- -- -- 20 100 %   07/05/24 0640 118/59 -- -- -- 18 100 %   07/05/24 0635 123/60 -- -- -- 18 100 %   07/05/24 0630 137/82 -- -- -- 18 98 %   07/05/24 0625 132/81 98.7  F (37.1  C) Oral -- 18 98 %   07/05/24 0620 134/85 -- -- -- 18 98 %   07/05/24 0615 128/82 -- -- -- 18 97 %   07/05/24 0605 126/85 -- -- -- 18 98 %   07/05/24 0600 132/85 -- -- -- 20 99 %   07/05/24 0555 136/84 -- -- -- 20 98 %   07/05/24 0550 134/78 -- -- -- 20 100 %   07/05/24 0541 (!) 142/80 98  F (36.7  C) Oral -- 18 --   07/05/24 0540 (!) 142/65 -- -- -- 20 100 %   07/05/24  0537 (!) 150/74 -- -- -- 20 --   24 0535 (!) 141/86 -- -- -- 20 100 %   24 0533 139/76 -- -- -- 20 --   24 0532 (!) 151/84 -- -- -- 20 --   24 0515 128/75 -- -- -- 18 98 %   24 0358 -- 97.5  F (36.4  C) Oral -- -- --   24 0250 133/78 -- -- -- 20 --   24 0230 -- 97.7  F (36.5  C) Oral -- -- --   24 0100 -- 98.4  F (36.9  C) Oral -- -- --   24 0000 -- 98.7  F (37.1  C) Oral -- -- --   24 2315 -- 99.4  F (37.4  C) Oral -- 18 --   24 2230 127/79 98.9  F (37.2  C) Oral -- 16 --     FHT: baseline: 135; variability: moderate; accels: yes; decels: None; ctx: q 2-4min, MVU range 140-165  Pitocin at 12mu/min  SVE 9/100/0    Labor Course:  -admit  1327: 1/50/-3/mid/med; nicholson 4  1535: vag miso #1  2030: attempted urias balloon placement; AROM for moderate amount of light meconium fluid    0316: SVE 5/100/-2  0530: epidural placement  0742 SVE unchanged, 5/100/-2, fetus LOP by Leopold's  0840 epidural replaced  0850 IUPC inserted  1030 Pitocin started, fetus JOHANN  1345 Cat II FHT x 80 minutes with minimal variability, Pitocin stopped at 1514  1542 Pitocin restarted at half rate after Cat I FHT  1830 amnioinfusion  1935 SVE 8/100/-2, diagnosis of Triple I infection based on elevated maternal temp and elevated WBC  2130 SVE 9/100/0    A:   at 41w4d, here for IOL for postdates pregnancy  Triple I infection  Labor status: active labor   GBS neg  Fetus Cat I     P:  Reassess SVE 2 hours after last check    Medically Ready for Discharge: Anticipated in 2-4 Days      Yaneli Huffman, EDUARDA MARLOWM

## 2024-07-06 NOTE — PLAN OF CARE
Problem: Labor  Goal: Acceptable Pain Control  Outcome: Progressing   Goal Outcome Evaluation:      Plan of Care Reviewed With: parent        VSS. Afebrile. Up ad deidre. Unable to void x3 and on third attempt, urias catheter was placed per CN order. Breast feeding with assistance but baby very uncoordinated. C/o minimal pain and medicated with relief. FOB here and supportive. Will continue to monitor.

## 2024-07-06 NOTE — PROGRESS NOTES
Category II Fetal Tracing   Category II Algorithm    S:   I was notified by labor RN of persistent Category II fetal heart rate tracing for 40 minutes.    Strip reviewed on unit with RN, charge RN, and OBGYN attending.    O:   Vitals:   Vitals:    07/05/24 1737 07/05/24 1813 07/05/24 1835 07/05/24 2000   BP: 133/66  131/67    BP Location:   Right arm    Patient Position:   Semi-Cantrell's    Cuff Size:   Adult Regular    Pulse:   116    Resp:   24    Temp: (!) 101  F (38.3  C) 100.3  F (37.9  C) (!) 100.7  F (38.2  C) 98.9  F (37.2  C)   TempSrc: Oral Oral Oral    SpO2: 98%  98%      Fetal Baseline Rate: 140, normal  Variability: minimal  Accelerations: not present   Decelerations: present -  intermittent early and late.       Deceleration frequency: intermittent       Are the decelerations significant?   Yes.  Uterine Activity: every 2-4 minutes    Interventions to improve fetal oxygenation for a Category II tracing include : Category II algorithm reviewed, continue monitoring, evaluate labor progress, and maternal positioning    A:   Persistent Category II tracing.     P:   Per the Category II algorithm, the plan is to continue to observe and apply intrauterine resuscitation measures as indicated.  Reevaluate in 60 min.    EDUARDA Bartholomew CNM

## 2024-07-06 NOTE — LACTATION NOTE
This note was copied from a baby's chart.  Consult for:  Lactation     Infant Name: Cathy    Infant's Primary Care Clinic: [facility]    Delivery Information:   Cathy was born at Gestational Age: 41w5d via Vaginal delivery on 2024 2:35 AM     Maternal Health History:   Information for the patient's mother:  Donna Craven [[id number]]   History reviewed. No pertinent past medical history. ,   Information for the patient's mother:  Donna Craven [[id number]]     Patient Active Problem List   Diagnosis     (normal spontaneous vaginal delivery)    Postpartum care and examination of lactating mother    Anemia due to blood loss, acute    ,   Information for the patient's mother:  Herber Donna A [[id number]]     Medications Prior to Admission   Medication Sig Dispense Refill Last Dose    Prenatal Vit-Fe Fumarate-FA (PNV PRENATAL PLUS MULTIVITAMIN) 27-1 MG TABS per tablet    2024    [DISCONTINUED] aspirin (ASA) 81 MG chewable tablet Take 81 mg by mouth daily   2024          Maternal Breast Exam:  Donna noted breast growth and sensitivity in early pregnancy. She denies any history of breast/chest injury or surgery. Her breasts are soft and symmetrical with bilateral intact, everted nipples. She has been able to hand express colostrum. ?    Breastfeeding/ Lactation History:     Infant information: infant was AGA at birth and has age appropriate output and weight loss.      Weight Change Since Birth: 0% at 0 day old     Oral exam of baby:  infant has slight asymmetry at jaw when visually looking at infant. She has, normal arched palate, & disorganized when sucking on finger.     Feeding History: difficulty latching, disorganized sucking    Feeding Assessment:  good positioning of infant by her mother, Donna, able to do cross-cradle and football. Nipples are everted and she is able to express colostrum. Infant does open her mouth to latch, able to get her mouth around the breast / nipple, but  becomes frustrated quickly as she has problems organizing her sucking and is unable to get into a nutritive rhythm while at the breast. Encouraged suck training with finger and drops of colostrum after the attempt at breast. Plan to continue suck training for 1 - 2 minutes every 30 minutes over the next couple hours and again prior to next feeding.  Attempted the feeding with nipple shield (size 20 mm) and Cathy was not able to use this device to assist with her sucking. Nipple shield is not recommended at this point since it was used to help Cathy suck more coordinated while at breast but was ineffective as a tool for now.     Education:   [] Expected  feeding patterns in the first few days (pg. 38 of Your Guide to To Postpartum and  Care)/ the Second Night  [] Stages of milk production  [] Benefits of hand expression of colostrum  [] Early feeding cues     [] Benefits of feeding on cue  [x] Benefits of skin to skin  [x] Breastfeeding positions  [x] Tips to get and maintain a deep latch  [x] Nutritive vs.non-nutritive sucking  [x] Gentle breast compressions as needed to enhance milk transfer  [] How to tell when baby is finished  [] How to tell if baby is getting enough  [] Expected  output  [] Crane weight loss  [] Infant Feeding Log  [] [facility] Breastfeeding/Pumping videos  [] Signs breastfeeding is going well (comfortable latch, audible swallows, age appropriate output and weight loss)    [] Tips to prevent engorgement  [] Signs of engorgement  [] Tips to manage engorgement  [] Pumping recommendations (based on patient need)  [] [facility] breast pump part/infant feeding supplies cleaning recommendations  [] Inpatient breastfeeding support  [] Outpatient lactation resources    Handouts:     Home Breast Pump:     Plan: Continue breastfeeding on cue with RN support as needed, goal of 8-12 feedings per day.     Encourage frequent skin to skin and hand expression.     Practice suck  training.    Encouraged follow up with outpatient lactation consultant  as needed after discharge.      Mercedes Taylor RN, IBCLC   Lactation Consultant  [place]: [facility] [phone]  Office: [phone]

## 2024-07-06 NOTE — PROVIDER NOTIFICATION
Pt still can not void after 2nd cath. Can you put in order for urias and stop time. Iman Henriquez [facility] text paged and updated.   Order for urias cath received with stop time of 12hrs after placement.

## 2024-07-06 NOTE — PROGRESS NOTES
Post Partum Note  SIGNIFICANT PROBLEMS:  Patient Active Problem List    Diagnosis Date Noted     (normal spontaneous vaginal delivery) 2024     Priority: Medium    Postpartum care and examination of lactating mother 2024     Priority: Medium    Anemia due to blood loss, acute 2024     Priority: Isaiah Thompson is doing well this morning, just very tired. Has not been able to void since transfer to . Was straight cath'd in L&D prior to transfer. No fevers, uterine tenderness or foul smelling discharge. Working on breastfeeding, not feeling confident at this point. Silvino is at the bedside and supportive.    INTERVAL HISTORY:  /65   Pulse 75   Temp 97.8  F (36.6  C) (Oral)   Resp 16   LMP 2023   SpO2 98%   Patient Vitals for the past 24 hrs:   BP Temp Temp src Pulse Resp SpO2   24 0800 111/65 97.8  F (36.6  C) Oral 75 16 --   24 0500 125/66 98.5  F (36.9  C) Oral 76 20 --   24 0421 -- -- -- -- -- 98 %   24 0418 125/62 -- -- -- -- --   24 0416 -- -- -- -- -- 98 %   24 0411 -- -- -- -- -- 97 %   24 0406 -- -- -- -- -- 98 %   24 0403 118/58 -- -- -- -- --   241 -- -- -- -- -- 97 %   24 0356 -- -- -- -- -- 97 %   24 -- -- -- -- -- 98 %   248 112/58 -- -- -- -- --   246 -- -- -- -- -- 98 %   24 -- -- -- -- -- 98 %   246 -- -- -- -- -- 99 %   243 126/59 -- -- -- -- --   24 -- -- -- -- -- 98 %   24 -- -- -- -- -- 99 %   24 -- -- -- -- -- 99 %   24 125/69 -- -- -- -- --   24 -- -- -- -- -- 99 %   24 -- -- -- -- -- 99 %   24 -- -- -- -- -- 99 %   24 135/76 98.6  F (37  C) Oral -- 24 99 %   24 -- -- -- -- -- 100 %   24 -- -- -- -- -- 100 %   24 133/67 -- -- 94 -- 100 %   24 -- -- -- -- -- 100 %   24 -- -- -- -- -- 100 %    07/06/24 0231 -- -- -- -- -- 100 %   07/06/24 0226 -- -- -- -- -- 100 %   07/06/24 0221 -- -- -- -- -- 100 %   07/06/24 0216 -- -- -- -- -- 99 %   07/06/24 0211 -- -- -- -- -- 100 %   07/06/24 0206 -- -- -- -- -- 100 %   07/06/24 0200 -- -- -- -- -- 98 %   07/06/24 0133 -- (!) 101  F (38.3  C) Oral -- -- --   07/06/24 0121 -- -- -- -- -- 97 %   07/06/24 0116 -- -- -- -- -- 98 %   07/06/24 0111 -- -- -- -- -- 97 %   07/06/24 0106 -- -- -- -- -- 99 %   07/06/24 0101 -- -- -- -- -- 98 %   07/06/24 0056 -- -- -- -- -- 100 %   07/06/24 0051 -- -- -- -- -- 100 %   07/06/24 0046 -- -- -- -- -- 100 %   07/06/24 0041 -- -- -- -- -- 98 %   07/06/24 0036 -- -- -- -- -- 100 %   07/06/24 0031 -- -- -- -- -- 99 %   07/06/24 0026 -- -- -- -- -- 100 %   07/06/24 0021 -- -- -- -- -- 100 %   07/06/24 0016 -- -- -- -- -- 98 %   07/06/24 0011 -- -- -- -- -- 99 %   07/06/24 0006 -- -- -- -- -- 99 %   07/06/24 0001 -- -- -- -- -- 100 %   07/05/24 2356 -- -- -- -- -- 100 %   07/05/24 2351 -- -- -- -- -- 100 %   07/05/24 2346 -- -- -- -- -- 99 %   07/05/24 2341 -- -- -- -- -- 100 %   07/05/24 2336 -- -- -- -- -- 98 %   07/05/24 2331 -- -- -- -- -- 98 %   07/05/24 2327 -- -- -- -- -- 94 %   07/05/24 2326 -- -- -- -- -- 94 %   07/05/24 2321 -- -- -- -- -- 95 %   07/05/24 2316 -- -- -- -- -- 95 %   07/05/24 2311 -- -- -- -- -- 95 %   07/05/24 2308 -- -- -- -- -- 94 %   07/05/24 2306 -- -- -- -- -- 94 %   07/05/24 2301 -- -- -- -- -- 95 %   07/05/24 2256 -- -- -- -- -- 95 %   07/05/24 2251 -- -- -- -- -- 94 %   07/05/24 2246 -- -- -- -- -- 96 %   07/05/24 2245 -- -- -- -- -- 94 %   07/05/24 2241 -- -- -- -- -- 94 %   07/05/24 2237 -- -- -- -- -- 94 %   07/05/24 2236 -- -- -- -- -- 95 %   07/05/24 2231 -- -- -- -- -- 97 %   07/05/24 2226 -- -- -- -- -- 95 %   07/05/24 2221 -- -- -- -- -- 95 %   07/05/24 2216 -- -- -- -- -- 97 %   07/05/24 2213 -- -- -- -- -- 94 %   07/05/24 2211 -- -- -- -- -- 97 %   07/05/24 2206 -- -- -- -- -- 97 %    07/05/24 2201 -- -- -- -- -- 96 %   07/05/24 2158 -- 99.6  F (37.6  C) Oral -- -- --   07/05/24 2156 -- -- -- -- -- 97 %   07/05/24 2152 -- -- -- -- -- 94 %   07/05/24 2151 -- -- -- -- -- 96 %   07/05/24 2146 -- -- -- -- -- 95 %   07/05/24 2145 -- -- -- -- -- 94 %   07/05/24 2141 -- -- -- -- -- 96 %   07/05/24 2139 -- -- -- -- -- 93 %   07/05/24 2136 -- -- -- -- -- 96 %   07/05/24 2134 -- -- -- -- -- 94 %   07/05/24 2131 136/72 -- -- -- -- 94 %   07/05/24 2126 -- -- -- -- -- 97 %   07/05/24 2121 -- -- -- -- -- 96 %   07/05/24 2116 -- -- -- -- -- 98 %   07/05/24 2111 -- -- -- -- -- 97 %   07/05/24 2106 -- -- -- -- -- 96 %   07/05/24 2101 -- -- -- -- -- 97 %   07/05/24 2056 -- -- -- -- -- 97 %   07/05/24 2051 -- -- -- -- -- 97 %   07/05/24 2046 -- -- -- -- -- 97 %   07/05/24 2041 -- -- -- -- -- 97 %   07/05/24 2038 -- 100  F (37.8  C) Oral -- -- --   07/05/24 2036 -- -- -- -- -- 97 %   07/05/24 2031 -- -- -- -- -- 98 %   07/05/24 2026 -- -- -- -- -- 98 %   07/05/24 2021 -- -- -- -- -- 99 %   07/05/24 2016 -- -- -- -- -- 97 %   07/05/24 2011 -- -- -- -- -- 95 %   07/05/24 2005 -- -- -- -- -- 96 %   07/05/24 2000 -- 98.9  F (37.2  C) Oral -- -- 97 %   07/05/24 1955 -- -- -- -- -- 98 %   07/05/24 1950 -- -- -- -- -- 98 %   07/05/24 1945 -- -- -- -- -- 97 %   07/05/24 1940 -- -- -- -- -- 96 %   07/05/24 1935 -- -- -- -- -- 94 %   07/05/24 1930 -- -- -- -- -- 95 %   07/05/24 1925 -- -- -- -- -- 99 %   07/05/24 1920 -- -- -- -- -- 98 %   07/05/24 1915 -- -- -- -- -- 97 %   07/05/24 1910 -- -- -- -- -- 96 %   07/05/24 1905 -- -- -- -- -- 97 %   07/05/24 1900 -- -- -- -- -- 98 %   07/05/24 1835 131/67 (!) 100.7  F (38.2  C) Oral 116 24 98 %   07/05/24 1813 -- 100.3  F (37.9  C) Oral -- -- --   07/05/24 1737 133/66 (!) 101  F (38.3  C) Oral -- -- 98 %   07/05/24 1650 125/63 99.4  F (37.4  C) Oral 89 24 98 %   07/05/24 1630 -- 99.4  F (37.4  C) -- -- -- --   07/05/24 1519 -- 98.8  F (37.1  C) Oral -- -- --   07/05/24 1453  129/74 99.5  F (37.5  C) Oral -- -- --   07/05/24 1350 136/75 98.8  F (37.1  C) Oral 99 22 100 %   07/05/24 1330 -- -- -- -- -- 100 %   07/05/24 1240 111/59 98.8  F (37.1  C) Oral 88 22 100 %   07/05/24 1137 117/65 98.8  F (37.1  C) Oral 86 24 --   07/05/24 1114 115/58 98.8  F (37.1  C) -- -- -- 100 %   07/05/24 1030 123/73 -- -- -- -- 99 %   07/05/24 1000 107/57 98.8  F (37.1  C) Oral -- -- 98 %      Pt stable, baby is rooming in  Breast feeding status:initiated  Complications since 2 hours post delivery: unable to void  Patient is tolerating acitivity well, cramping is relieved by Ibuprophen, lochia is decreasing and patient denies clots.  Perineal pain is is relieved by Ibuprophen and is relieved by ice pack.  The perineum laceration is well approximated    Postpartum hemoglobin   Hemoglobin   Date Value Ref Range Status   07/06/2024 10.8 (L) 11.7 - 15.7 g/dL Final     Blood type: O POS  Rubella: immune  History of depression: none    ASSESSMENT/PLAN:  Normal postpartum exam except unable to void  Stable Post-partum day #0  Complications:difficulty voiding  Plan d/c home tomorrow afternoon or Monday morning  RTC in 2 weeks for PP check, and 6 weeks  Teaching done: none; patient very tired  Birthcontrol planned:did not discuss  Current Discharge Medication List        CONTINUE these medications which have NOT CHANGED    Details   aspirin (ASA) 81 MG chewable tablet Take 81 mg by mouth daily      Prenatal Vit-Fe Fumarate-FA (PNV PRENATAL PLUS MULTIVITAMIN) 27-1 MG TABS per tablet            Montez Henriquez CNM

## 2024-07-06 NOTE — PROGRESS NOTES
S:  Donna is starting to push in side lying positions. Silvino at beside counting with calm kevin to help guide pushing.     O:  /72   Pulse 116   Temp 99.6  F (37.6  C) (Oral)   Resp 24   LMP 09/18/2023   SpO2 95%   Patient Vitals for the past 24 hrs:   BP Temp Temp src Pulse Resp SpO2   07/05/24 2226 -- -- -- -- -- 95 %   07/05/24 2221 -- -- -- -- -- 95 %   07/05/24 2216 -- -- -- -- -- 97 %   07/05/24 2213 -- -- -- -- -- 94 %   07/05/24 2211 -- -- -- -- -- 97 %   07/05/24 2206 -- -- -- -- -- 97 %   07/05/24 2201 -- -- -- -- -- 96 %   07/05/24 2158 -- 99.6  F (37.6  C) Oral -- -- --   07/05/24 2156 -- -- -- -- -- 97 %   07/05/24 2152 -- -- -- -- -- 94 %   07/05/24 2151 -- -- -- -- -- 96 %   07/05/24 2146 -- -- -- -- -- 95 %   07/05/24 2145 -- -- -- -- -- 94 %   07/05/24 2141 -- -- -- -- -- 96 %   07/05/24 2139 -- -- -- -- -- 93 %   07/05/24 2136 -- -- -- -- -- 96 %   07/05/24 2134 -- -- -- -- -- 94 %   07/05/24 2131 136/72 -- -- -- -- 94 %   07/05/24 2126 -- -- -- -- -- 97 %   07/05/24 2121 -- -- -- -- -- 96 %   07/05/24 2116 -- -- -- -- -- 98 %   07/05/24 2111 -- -- -- -- -- 97 %   07/05/24 2106 -- -- -- -- -- 96 %   07/05/24 2101 -- -- -- -- -- 97 %   07/05/24 2056 -- -- -- -- -- 97 %   07/05/24 2051 -- -- -- -- -- 97 %   07/05/24 2046 -- -- -- -- -- 97 %   07/05/24 2041 -- -- -- -- -- 97 %   07/05/24 2038 -- 100  F (37.8  C) Oral -- -- --   07/05/24 2036 -- -- -- -- -- 97 %   07/05/24 2031 -- -- -- -- -- 98 %   07/05/24 2026 -- -- -- -- -- 98 %   07/05/24 2021 -- -- -- -- -- 99 %   07/05/24 2016 -- -- -- -- -- 97 %   07/05/24 2011 -- -- -- -- -- 95 %   07/05/24 2005 -- -- -- -- -- 96 %   07/05/24 2000 -- 98.9  F (37.2  C) Oral -- -- 97 %   07/05/24 1955 -- -- -- -- -- 98 %   07/05/24 1950 -- -- -- -- -- 98 %   07/05/24 1945 -- -- -- -- -- 97 %   07/05/24 1940 -- -- -- -- -- 96 %   07/05/24 1935 -- -- -- -- -- 94 %   07/05/24 1930 -- -- -- -- -- 95 %   07/05/24 1925 -- -- -- -- -- 99 %   07/05/24  1920 -- -- -- -- -- 98 %   07/05/24 1915 -- -- -- -- -- 97 %   07/05/24 1910 -- -- -- -- -- 96 %   07/05/24 1905 -- -- -- -- -- 97 %   07/05/24 1900 -- -- -- -- -- 98 %   07/05/24 1835 131/67 (!) 100.7  F (38.2  C) Oral 116 24 98 %   07/05/24 1813 -- 100.3  F (37.9  C) Oral -- -- --   07/05/24 1737 133/66 (!) 101  F (38.3  C) Oral -- -- 98 %   07/05/24 1650 125/63 99.4  F (37.4  C) Oral 89 24 98 %   07/05/24 1630 -- 99.4  F (37.4  C) -- -- -- --   07/05/24 1519 -- 98.8  F (37.1  C) Oral -- -- --   07/05/24 1453 129/74 99.5  F (37.5  C) Oral -- -- --   07/05/24 1350 136/75 98.8  F (37.1  C) Oral 99 22 100 %   07/05/24 1330 -- -- -- -- -- 100 %   07/05/24 1240 111/59 98.8  F (37.1  C) Oral 88 22 100 %   07/05/24 1137 117/65 98.8  F (37.1  C) Oral 86 24 --   07/05/24 1114 115/58 98.8  F (37.1  C) -- -- -- 100 %   07/05/24 1030 123/73 -- -- -- -- 99 %   07/05/24 1000 107/57 98.8  F (37.1  C) Oral -- -- 98 %   07/05/24 0930 -- -- -- -- -- 100 %   07/05/24 0924 110/63 -- -- -- -- 99 %   07/05/24 0915 132/76 -- -- -- -- 95 %   07/05/24 0908 -- 98.8  F (37.1  C) Oral -- -- --   07/05/24 0905 138/89 -- -- -- -- 95 %   07/05/24 0901 136/84 98.8  F (37.1  C) Oral -- -- --   07/05/24 0900 -- -- -- -- -- 98 %   07/05/24 0856 -- -- -- -- -- 94 %   07/05/24 0855 -- -- -- -- -- 96 %   07/05/24 0853 125/79 -- -- -- -- --   07/05/24 0851 131/82 -- -- -- -- --   07/05/24 0850 -- -- -- -- -- 98 %   07/05/24 0849 134/83 -- -- -- -- --   07/05/24 0847 131/87 -- -- -- -- --   07/05/24 0845 124/84 -- -- -- -- 100 %   07/05/24 0843 122/85 -- -- -- -- --   07/05/24 0841 123/86 -- -- -- -- --   07/05/24 0840 -- -- -- -- -- 100 %   07/05/24 0839 126/83 -- -- -- -- --   07/05/24 0837 (!) 141/83 -- -- -- -- --   07/05/24 0835 (!) 148/84 -- -- -- -- 100 %   07/05/24 0833 137/80 -- -- -- -- --   07/05/24 0830 -- -- -- -- -- 97 %   07/05/24 0829 131/83 -- -- -- -- --   07/05/24 0825 -- -- -- -- -- 100 %   07/05/24 0820 -- -- -- -- -- 99 %    07/05/24 0815 (!) 143/75 -- -- -- -- 100 %   07/05/24 0810 -- -- -- -- -- 98 %   07/05/24 0805 -- -- -- -- -- 99 %   07/05/24 0800 138/65 -- -- -- -- 98 %   07/05/24 0730 128/75 -- -- -- -- 100 %   07/05/24 0715 131/75 98.5  F (36.9  C) -- -- 18 98 %   07/05/24 0700 -- -- -- -- -- 100 %   07/05/24 0655 129/73 -- -- -- 20 100 %   07/05/24 0640 118/59 -- -- -- 18 100 %   07/05/24 0635 123/60 -- -- -- 18 100 %   07/05/24 0630 137/82 -- -- -- 18 98 %   07/05/24 0625 132/81 98.7  F (37.1  C) Oral -- 18 98 %   07/05/24 0620 134/85 -- -- -- 18 98 %   07/05/24 0615 128/82 -- -- -- 18 97 %   07/05/24 0605 126/85 -- -- -- 18 98 %   07/05/24 0600 132/85 -- -- -- 20 99 %   07/05/24 0555 136/84 -- -- -- 20 98 %   07/05/24 0550 134/78 -- -- -- 20 100 %   07/05/24 0541 (!) 142/80 98  F (36.7  C) Oral -- 18 --   07/05/24 0540 (!) 142/65 -- -- -- 20 100 %   07/05/24 0537 (!) 150/74 -- -- -- 20 --   07/05/24 0535 (!) 141/86 -- -- -- 20 100 %   07/05/24 0533 139/76 -- -- -- 20 --   07/05/24 0532 (!) 151/84 -- -- -- 20 --   07/05/24 0515 128/75 -- -- -- 18 98 %   07/05/24 0358 -- 97.5  F (36.4  C) Oral -- -- --   07/05/24 0250 133/78 -- -- -- 20 --   07/05/24 0230 -- 97.7  F (36.5  C) Oral -- -- --   07/05/24 0100 -- 98.4  F (36.9  C) Oral -- -- --     FHT: baseline: 135; variability: moderate; accels: yes; decels: variable decels with pushing; ctx: q 2-3 min  Pitocin at 16mu/min  SVE complete/100/0    Labor Course:  7/4-admit  1327: 1/50/-3/mid/med; nicholson 4  1535: vag miso #1  2030: attempted urias balloon placement; AROM for moderate amount of light meconium fluid  7/5  0316: SVE 5/100/-2  0530: epidural placement  0742 SVE unchanged, 5/100/-2, fetus LOP by Leopold's  0840 epidural replaced  0850 IUPC inserted  1030 Pitocin started, fetus JOHANN  1345 Cat II FHT x 80 minutes with minimal variability, Pitocin stopped at 1514  1542 Pitocin restarted at half rate after Cat I FHT  1830 amnioinfusion  1935 SVE 8/100/-2, diagnosis of  Triple I infection based on elevated maternal temp and elevated WBC  2130 SVE 9/100/0  1130 SVE complete  1145 start pushing    A:   at 41w4d, here for IOL for postdates pregnancy  Triple I infection, receiving ampillicin and gentamycin  Labor status: active labor   GBS neg  Fetus Cat II    P:  Continue pushing with position changes  Plan NICU at delivery for meconium stained fluid and triple I infection    Medically Ready for Discharge: Anticipated in 2-4 Days      EDUARDA BartholomewM

## 2024-07-06 NOTE — PROGRESS NOTES
Provider notification:   Provider: Faith Huffman CNM was present at [facility] strip huddle at 2000 and was aware of : a persistent Category II fetal heart rate tracing for 40 minutes.    Category II Algorithm     Fetal heart rate and uterine activity reviewed with provider.    EFM interpretation suggests: concern for persistent Category II tracing due to: minimal variability.  EFM suggests no concern for interruption of the oxygen pathway..     Interventions to improve fetal oxygenation for a Category II tracing include: Category II algorithm reviewed, continue monitoring, evaluate labor progress, and      After discussion with provider:Plan reassessment in 60 minutes    Plan per provider / orders received for gentamicin r/t triple I

## 2024-07-06 NOTE — PROGRESS NOTES
"Data for  Early-Onset Sepsis Calculator:    Gestational Age:  41w5d  Maternal temperature range:  Temp  Av  F (37.2  C)  Min: 97.5  F (36.4  C)  Max: 101  F (38.3  C)  Membranes ruptured for: 30 hours    GBS status   Lab Results   Component Value Date    GBPCRT Negative 2024     No results found for: \"GBS\"  Antibiotic Status:  Reason for Antibiotics  Triple I   Antibiotics for GBS     Duration     Antibiotics for Chorioamnionitis  Yes, ampicillin   Duration  >2 hours     [place] Sepsis Calculator using incidence of 0.1000 live births  (Link will take you to calculator)    Assessment:  Pre-birth sepsis score: 0.98  This baby is is WELL APPEARING on clinical exam   Post-birth sepsis score: 0.4.      Plan:  -Admit to  Nursery.     -No culture, no antibiotics - Routine vital signs per  Admission Order Set.       NARESH to be contacted at 4 hours of age and/or with concerns during first 4 hours of life.     Javed Subramanian, APRN, CNP 2024 3:01 AM   Advanced Practice Providers  [facility]    "

## 2024-07-06 NOTE — PROGRESS NOTES
Data: Donna Craven transferred to [place] [address] via wheelchair at 0452. Baby transferred via parent's arms.  Action: Receiving unit notified of transfer: Yes. Patient and family notified of room change. Report given to ALEXIA Contreras at 0406. Belongings sent to receiving unit. Accompanied by Registered Nurse. Oriented patient to surroundings. Call light within reach. ID bands double-checked with receiving RN.  Response: Patient tolerated transfer and is stable.

## 2024-07-06 NOTE — PROGRESS NOTES
of viable Female with Faith DIAZ CNM in attendance. NICU & Nursery ALEXIA Butler present. Infant, dried and stimulated. Apgars 6/9. Placenta delivered without complications, pitocin bolused, one 2nd degree laceration with repairs done. Ariana cares provided. Mother and baby in stable condition.

## 2024-07-06 NOTE — L&D DELIVERY NOTE
OB Vaginal Delivery Note    Donna Craven MRN# [id number]   Age: 38 year old YOB: 1985     Labor Story:  Donna came to hospital on 7/5/24 for induction of labor for postdates pregnancy. She was supported by her  Silvino throughout labor. IOL was started with vaginal miso and then Donna experienced AROM with placement attempt of Castellanos balloon. She contracted independently for many hours and had epidural placed for pain management. Several hours later she was found to have no cervical change. IUPC was placed and Pitocin started. Epidural was not providing relief so it was replaced. FHT were monitored with CEFM and were Cat I and Cat II throughout labor. FHT were reviewed by CNM, RN, charge RN, and OBGYN for ongoing care and assessment. Position changes, amnioinfusion, discontinuing Pitocin, fluid boluses were all used to assist in improving fetal heart tracing. Donna developed Triple I infection in labor and was treated with ampicillin and gentamicin. She was found to be completely dilated at 2330 and started pushing at 2345. She pushed with great strength. After two hours, FHT was Cat II with tachycardia and minimal variability. OBGYN called to room at 0156 to assess for vacuum delivery. Donna pushed with additional strength and baby made excellent progress. FHT were stable, so decision was made by team to continue pushing without vacuum. NICU called to room for delivery for Cat II FHT, Triple I infection, and meconium stained fluid. At 0233, fetal head crowned in JOHANN presentation. Head was born and then shoulders delivered with ease. Baby placed on Donna's abdomen. At 1 minute of life, cord doubly clamped and cut by Silvino and then baby taken to warmer and cared for by NICU team. See their note for details. Pitocin started for active management of third stage. Cord gases collected. Placenta delivered in Frank presentation with trailing membranes. Fundus firm. Perineum inspected and  second degree laceration repaired in usual fashion. Bilateral labial lacs unrepaired and hemostatic. Cervix prolapsed into vaginal vault, replaced into posterior fornix. Donna and Silvino are texting family and excited to announce the birth of baby Cathy Gill! Donna is relieved to be done with labor and to spend time with her baby.     GA: 41w5d  GP:   Labor Complications: Dysfunctional Labor;Intraamniotic Infection   EBL:   mL  Delivery QBL: 650 mL  Delivery Type: Vaginal, Spontaneous   ROM to Delivery Time: (Delivered) Days: 1 Hours: 6 Minutes: 5  Duncan Weight: 3.459 kg (7 lb 10 oz)    1 Minute 5 Minute 10 Minute   Apgar Totals: 6   9        HILTON SAMPSON;MIGUEL ANGEL RAHMAN     Delivery Details:  Donna Craven, a 38 year old  female delivered a viable infant with apgars of 6  and 9 . Patient was fully dilated and pushing after   hours   minutes in active labor. Delivery was via vaginal, spontaneous  to a sterile field under epidural  anesthesia. Infant delivered in vertex  left  occiput  anterior  position. Anterior and posterior shoulders delivered without difficulty. The cord was clamped, cut twice and   were noted. Cord blood was obtained in routine fashion with the following disposition:  .      Cord complications: nuchal   Placenta delivered at 2024  2:44 AM . Placental disposition was Hospital disposal . Fundal massage performed and fundus found to be firm.     Episiotomy:     Perineum, vagina, cervix were inspected, and the following lacerations were noted:   Perineal lacerations: 2nd     labial laceration: bilateral           Any lacerations were repaired in the usual fashion using 3-0 Vicryl.    Excellent hemostasis was noted. Needle count correct. Infant and patient in delivery room in good and stable condition.        Herber Female-Donna [[id number]]      Labor Event Times      Latent labor onset date/time: 2024 0316    Active labor onset date: 24 Onset time:  3:21 PM    Dilation complete date: 24 Complete time: 11:36 PM   Start pushing date/time: 2024 2344          Labor Events     labor?: No   steroids: None  Labor Type: Induction/Cervical ripening  Predominate monitoring during 1st stage: continuous electronic fetal monitoring     Antibiotics received during labor?: No       Rupture date/time: 24   Rupture type: Artificial Rupture of Membranes  Fluid color: Meconium     Induction: Misoprostol, Oxytocin  Induction date/time:      Cervical ripening date/time: 24 1535    Indications for induction: Post-term Gestation     Augmentation: None       Delivery/Placenta Date and Time      Delivery Date: 24 Delivery Time:  2:35 AM   Placenta Date/Time: 2024  2:44 AM  Oxytocin given at the time of delivery: after delivery of baby  Delivering clinician: Yaneli Huffman APRN CNM   Other personnel present at delivery:  Provider Role   Sima Saxena RN Delivery Nurse   Carrie Bowers RN Registered Nurse             Vaginal Counts       Initial count performed by 2 team members:  Two Team Members   TONNY Blue CNM         Needles Suture Needles Sponges (RETIRED) Instruments   Initial counts 2 0 5    Added to count  1     Relief counts       Final counts               Placed during labor Accounted for at the end of labor   FSE NA NA   IUPC Yes Yes   Cervidil NA NA                             Apgars       1 Minute 5 Minute 10 Minute 15 Minute 20 Minute   Skin color: 1  1       Heart rate: 2  2       Reflex irritability: 1  2       Muscle tone: 1  2       Respiratory effort: 1  2       Total: 6  9       Apgars assigned by: EDUARDA SANCHEZ       Cord      Cord Complications: Nuchal   Nuchal Intervention: delivered through         Nuchal cord description: loose nuchal cord         Stem cell collection?: No            Resuscitation    Methods: Oximetry, NCPAP, Oxygen   Care at Delivery:  [facility] Delivery Note    Requested by EDUARDA Cordero to attend the delivery of this term, female infant with a gestational age of 41 5/7 weeks secondary to maternal Triple I dx.      Infant delivered at 0235 hours on 2024. Infant had spontaneous respirations at birth with delayed cord clamping. She was placed on a warmer, dried, stimulated, and orally suctioned for copious amount of mec stained fluid at birth. Pulse ox placed on right hand. CPAP +6 21% placed for work of breathing while she was trying to mobilize secretions. Sats WNL. She had good lung sounds, good heart rate and good tone. Transitioned baby to mom's chest for skin to skin and infant had decreased work of breathing. Infant is well appearing overall. Apgars were 6 at one minute and 9 at five minutes of age. Gross PE is WNL except for head molding. Infant was shown to mother and father and will be transferred to the [facility] for further care.    EDUARDA Blum, CNP 2024 3:07 AM   Advanced Practice Providers  [facility]        Output in Delivery Room: Stool        Measurements      Weight: 7 lb 10 oz    Output in delivery room: Stool       Skin to Skin and Feeding Plan      Skin to skin initiation date/time: 1841    Skin to skin with: Mother  Skin to skin end date/time:            Labor Events and Shoulder Dystocia    Fetal Tracing Prior to Delivery: Category 2  Shoulder dystocia present?: Neg       Delivery (Maternal) (Provider to Complete) (226937)      Perineal lacerations: 2nd Repaired?: Yes     Labial laceration: bilateral Repaired?: No   Repair suture: 3-0 Vicryl  Number of repair packets: 1  Genital tract inspection done: Pos       Blood Loss  Mother: Donna Craven #[id number]     Start of Mother's Information      Delivery Blood Loss  24 1521 - 24 0331      Delivery QBL (mL) Hospital Encounter 650 mL    Total  650 mL               End of Mother's  Information  Mother: Donna Craven #[id number]                Delivery - Provider to Complete ([id number])    Delivering clinician: Yaneli Huffman APRN CNM  Delivery Type (Choose the 1 that will go to the Birth History): Vaginal, Spontaneous                         Other personnel:  Provider Role   Sima Saxena, RN Delivery Nurse   Carrie Bowers RN Registered Nurse                    Placenta    Date/Time: 7/6/2024  2:44 AM  Removal: Spontaneous  Disposition: Hospital disposal             Anesthesia    Method: Epidural  Cervical dilation at placement: 4-7                    Presentation and Position    Presentation: Vertex    Position: Left Occiput Anterior                     EDUARDA Bartholomew CNM

## 2024-07-06 NOTE — PROVIDER NOTIFICATION
07/05/24 2126   Provider Notification   Provider Name/Title TONNY Cuevas   Method of Notification At Bedside   Request Evaluate in Person   Notification Reason SVE     CNM plans to reevaluate at 2330 or sooner if needed.

## 2024-07-06 NOTE — PROVIDER NOTIFICATION
07/05/24 2035   Provider Notification   Provider Name/Title Faith DIAZ CNM   Method of Notification In Department   Request Evaluate - Remote   Notification Reason Other (Comment)     CNM updated nurse she further reviewed EFM strip with Dr. Taylor and would like pitocin increased by 2.

## 2024-07-06 NOTE — CARE PLAN
Patient arrived to [facility] unit via wheelchair at 5:00 ,with belongings, accompanied by spouse/ significant other, with infant in arms. Received report from  Sima HALE  and checked bands. Unit and room orientation completd. Call light given; no concerns present at this time. Continue with plan of care.

## 2024-07-06 NOTE — LACTATION NOTE
This note was copied from a baby's chart.  Consult for:  First time breastfeeding mother     Infant Name: Cathy    Infant's Primary Care Clinic: [facility]    Delivery Information:  Cathy was born at Gestational Age: 41w5d via vaginal delivery on 2024 2:35 AM. Labor c/b prolonged ROM, MSAF and maternal Triple I.     Maternal Health History:  Maternal past medical history, problem list and prior to admission medications reviewed and unremarkable.    Maternal Breast Exam:  Donna noted breast growth and sensitivity in early pregnancy. Her breasts are soft and symmetrical with bilateral intact, everted nipples. She has been able to hand express colostrum. ?    Infant information: Cathy was AGA at birth and has age appropriate output. ABO incompatibility with positive GENNY    Weight Change Since Birth: n/a, less than 24 hours old at time of consult     Oral exam of baby:  Uncoordinated suckle on LC finger when trying to stimulate for latch.     Feeding History: Baby has been sleepy and spitty since delivery, Donna has been doing STS and HE q 2-3 hours     Feeding Assessment:  Mother brings baby to breast in football hold with minimal assistance. LC reviews positioning and how to use pillows and hands for support. Baby is disinterested and gaggy, after a few attempts LC encourages mother to place baby STS on her chest. If showing feeding cues bring back to breast.   LC provides reassurance for sleepy behavior in the first 24 hours, encouraged offering the breast on demand/ q 2-3 hours, frequent STS and HE after BF attempts.     Education:   [x] Expected  feeding patterns in the first few days (pg. 38 of Your Guide to To Postpartum and  Care)/ the Second Night  [x] Stages of milk production  [x] Benefits of hand expression of colostrum  [x] Early feeding cues     [x] Benefits of feeding on cue  [x] Benefits of skin to skin  [x] Breastfeeding positions  [x] Tips to get and maintain a deep  latch  [x] How to tell if baby is getting enough  [x] Expected  output  [x] Infant Feeding Log  [x] [employer] Breastfeeding/Pumping videos  [x] Signs breastfeeding is going well (comfortable latch, audible swallows, age appropriate output and weight loss)    [x] Tips to prevent engorgement  [x] Signs of engorgement  [x] Tips to manage engorgement  [x] Pumping recommendations (based on patient need)  [x] Inpatient breastfeeding support  [x] Outpatient lactation resources    Handouts: Infant Feeding Log (Week 1, Your Guide to Postpartum &  Care Book) and [facility] Lactation Resources    Home Breast Pump: has pump at home    Plan: Continue breastfeeding on cue with RN support as needed, goal of 8-12 feedings per day.     Encourage frequent skin to skin and hand expression.     Encouraged follow up with outpatient lactation consultant  as needed after discharge. Family plans to follow up with [facility].      Willa Gutierrez, RN, IBCLC   Lactation Consultant  [place]: [facility] [phone]  Office: [phone]

## 2024-07-06 NOTE — PROGRESS NOTES
Notified by bedside RN that Donna still has not been able to void. She had straight cath prior to transfer to [facility] for 900mL, and again this morning for 700mL.     Will plan for urias catheter placement for 12 hours. Remove after 12 hours, and trial of void again.    Montez Henriquez CNM

## 2024-07-07 PROCEDURE — 250N000013 HC RX MED GY IP 250 OP 250 PS 637: Performed by: ADVANCED PRACTICE MIDWIFE

## 2024-07-07 PROCEDURE — 120N000002 HC R&B MED SURG/OB UMMC

## 2024-07-07 RX ORDER — LANOLIN ALCOHOL/MO/W.PET/CERES
1000 CREAM (GRAM) TOPICAL DAILY
Qty: 60 TABLET | Refills: 0 | Status: SHIPPED | OUTPATIENT
Start: 2024-07-07 | End: 2024-09-05

## 2024-07-07 RX ORDER — FERROUS SULFATE 325(65) MG
325 TABLET ORAL EVERY OTHER DAY
Qty: 30 TABLET | Refills: 0 | Status: SHIPPED | OUTPATIENT
Start: 2024-07-07 | End: 2024-09-05

## 2024-07-07 RX ORDER — MULTIVIT WITH MINERALS/LUTEIN
250 TABLET ORAL DAILY
Qty: 60 TABLET | Refills: 0 | Status: SHIPPED | OUTPATIENT
Start: 2024-07-07 | End: 2024-09-05

## 2024-07-07 RX ADMIN — IBUPROFEN 800 MG: 800 TABLET, FILM COATED ORAL at 22:57

## 2024-07-07 RX ADMIN — BENZOCAINE AND LEVOMENTHOL: 200; 5 SPRAY TOPICAL at 18:00

## 2024-07-07 RX ADMIN — ACETAMINOPHEN 650 MG: 325 TABLET, FILM COATED ORAL at 04:01

## 2024-07-07 RX ADMIN — ACETAMINOPHEN 650 MG: 325 TABLET, FILM COATED ORAL at 22:15

## 2024-07-07 RX ADMIN — ACETAMINOPHEN 650 MG: 325 TABLET, FILM COATED ORAL at 13:59

## 2024-07-07 RX ADMIN — ACETAMINOPHEN 650 MG: 325 TABLET, FILM COATED ORAL at 09:15

## 2024-07-07 RX ADMIN — IBUPROFEN 800 MG: 800 TABLET, FILM COATED ORAL at 03:59

## 2024-07-07 RX ADMIN — IBUPROFEN 800 MG: 800 TABLET, FILM COATED ORAL at 16:41

## 2024-07-07 RX ADMIN — BENZOCAINE AND LEVOMENTHOL: 200; 5 SPRAY TOPICAL at 23:02

## 2024-07-07 RX ADMIN — DOCUSATE SODIUM 100 MG: 100 CAPSULE, LIQUID FILLED ORAL at 09:16

## 2024-07-07 RX ADMIN — IBUPROFEN 800 MG: 800 TABLET, FILM COATED ORAL at 10:36

## 2024-07-07 ASSESSMENT — ACTIVITIES OF DAILY LIVING (ADL)
ADLS_ACUITY_SCORE: 19

## 2024-07-07 NOTE — LACTATION NOTE
This note was copied from a baby's chart.  Follow Up Consult    Infant Name: Cathy    Infant's Primary Care Clinic: [facility]    Maternal Assessment: Donna feels things are going ok, reports Cathy cluster fed overnight and was very spitty. Worried she is not getting enough at the breast.       Infant Assessment:  Cathy has age appropriate output and weight loss.      Weight Change Since Birth: -5% at 1 day old      Feeding Assessment: Donna has Cathy at breast in football hold when LC arrives. Cathy is latched but not actively suckling. LC encourages keeping baby in close at the breast and using pillows for support. Cathy becomes more active when leaned in towards the breast and Donna reports that it mostly feels like pulling but she can occasionally feel baby's tongue pushing into her breast.   Provider to room during feeding, LC steps out so provider can speak with family. Per RN, Donna independently brings Cathy to the other side to BF and is now resting.     Education:   [x] Expected  feeding patterns in the first few days (pg. 38 of Your Guide to To Postpartum and [place] Care)/ the Second Night  [x] Stages of milk production  [x] Benefits of hand expression of colostrum  [x] Breastfeeding positions  [x] Tips to get and maintain a deep latch  [x] How to tell if baby is getting enough  [x] Expected  output  [x] Infant Feeding Log  [x] Signs breastfeeding is going well (comfortable latch, audible swallows, age appropriate output and weight loss)    [x] Inpatient breastfeeding support  [x] Outpatient lactation resources    Handouts: [facility] Lactation Resources    Home Breast Pump: Has pump at home     Plan: Plan: Continue breastfeeding on cue with RN support as needed with a goal of 8-12 feedings per day. Encourage frequent skin to skin, breast massage and hand expression.     Encouraged follow up with outpatient lactation consultant  as needed after discharge. Family plans  to follow up with [facility]. Donna also shares her cousin is an IBCLC in the [place] and her mother had 6 children and will be a good source of lactation support after discharge.        Willa Gutierrez RN, IBCLC   Lactation Consultant  [place]: [facility] [phone]  Office: [phone]

## 2024-07-07 NOTE — PLAN OF CARE
Goal Outcome Evaluation:      Plan of Care Reviewed With: patient                 Problem: Postpartum (Vaginal Delivery)  Goal: Effective Urinary Elimination  7/7/2024 1825 by Vivien Vaughn, RN  Outcome: Not Progressing  7/7/2024 1825 by Vivien Vaughn, RN  Outcome: Progressing        VSS. AFebrile. Breast feeding better today as baby is more coordinated with sucking. Pt continues to struggle with voiding adequately. Able to void small amounts in morning but then unable to void afterwards. Bladder scanned for 875cc and st cath for 900cc.  Pt continues not to be able to void after st cath and CNM aware. Will come talk with pt but ordered for urias to be placed again. Pt requesting to talk with CNM before urias replaced. Bladder scanned for only 336cc so will wait until CNM comes talk to pt. Pt and FOB are asking more questions with plan of care if MOB can't void. Pt and FOB getting frustrated but remains pleasant. C/o minimal pain and medicated with relief. Will continue to monitor.

## 2024-07-07 NOTE — PLAN OF CARE
Goal Outcome Evaluation:       VSS and assessment are WDL. Fundus midline, firm, and U/2. Lochia is scant. Pain adequately managed with tylenol and ibuprofen. Castellanos was out at 3:45, pt is due to void. Second degree of laceration . Breastfeeding and hand expressing. encouraged waking baby and feeding every t2 to 3 hour. Bonding well with . Continue with plan of care.

## 2024-07-07 NOTE — LACTATION NOTE
"This note was copied from a baby's chart.  Brief Lactation Consult    Checked back with Donna to offer additional support. Reports she feels things are improving although Cathy has been more sleepy today vs her feeding behavior overnight. LC provides reassurance for \"night owl\" feedings and encourages continuing with on demand feedings and hand expression. Reviewed  feeding behaviors, normal progression of milk supply, outpatient lactation support and community supports. Per Donna planning for discharge tomorrow.       Plan: Continue breastfeeding on cue with RN support as needed with a goal of 8-12 feedings per day. Encourage frequent skin to skin, breast massage and hand expression.       Willa Gutierrez, RN, IBCLC   Lactation Consultant  [place]: [facility] [phone]  Office: [phone]          "

## 2024-07-07 NOTE — PROGRESS NOTES
Post Partum Note  Donna Craven is feeling tired but good. Feeling content about labor and birth. Discussed anemia and plan for oral iron postpartum. Pain is well controlled with Ibuprofen and Tylenol. Bleeding is like a period. Breastfeeding is initiated with support from lactation RN. Donna had urinary retention postpartum with Castellanos catheter inserted and is now voiding independently. Discussed birth control options including mini pill, Nexplanon, Depo, Mirena IUD, and Paragard IUD. Donna Craven has chosen natural family planning (2 methods). Reviewed postpartum discharge teaching and warning signs including increased bleeding, signs of infection, risks of blood clots and pre-eclampsia, and mood changes. Plan for discharge later today or tomorrow depending on baby and patient preference with breastfeeding support.    SIGNIFICANT PROBLEMS:  Patient Active Problem List    Diagnosis Date Noted     (normal spontaneous vaginal delivery) 2024     Priority: Medium    Postpartum care and examination of lactating mother 2024     Priority: Medium    Anemia due to blood loss, acute 2024     Priority: Medium       INTERVAL HISTORY:  /72 (BP Location: Left arm, Patient Position: Semi-Cantrell's, Cuff Size: Adult Regular)   Pulse 72   Temp 97.8  F (36.6  C) (Oral)   Resp 16   Wt 94.3 kg (208 lb)   LMP 2023   SpO2 98%   BMI 34.88 kg/m    Pt stable, baby is rooming in  Breast feeding status:initiated  Complications since 2 hours post delivery: None  Patient is tolerating acitivity well Voiding without difficulty, cramping is relieved by Ibuprophen, lochia is decreasing and patient denies clots.  Perineal pain is is relieved by Ibuprophen.  The perineum laceration is well approximated      Postpartum hemoglobin   Hemoglobin   Date Value Ref Range Status   2024 10.8 (L) 11.7 - 15.7 g/dL Final     Blood type  O pos  Rubella status immune  History of depression:  no    ASSESSMENT/PLAN:  Normal postpartum exam   Stable Post-partum day #1  Complications:anemic, plan for iron supplementation  Plan d/c home as scheduled  RTC 6 weeks  Teaching done: D/C Instructions: Nutrition/Activity, Engorgement Management, Birth Control Options, Warning Signs/When to Call: Excessive Bleeding, Infection, PP Depression, Kegals and Crunches, RTC Clinic for PP Appointment, PNV, and Iron supplemenation  Birthcontrol planned:natural family planning  Current Discharge Medication List        START taking these medications    Details   acetaminophen (TYLENOL) 325 MG tablet Take 2 tablets (650 mg) by mouth every 6 hours as needed for mild pain Start after Delivery.    Associated Diagnoses:  (normal spontaneous vaginal delivery)      cyanocobalamin (VITAMIN B-12) 1000 MCG tablet Take 1 tablet (1,000 mcg) by mouth daily for 60 days  Qty: 60 tablet, Refills: 0    Associated Diagnoses: Anemia due to blood loss, acute      ferrous sulfate (FEROSUL) 325 (65 Fe) MG tablet Take 1 tablet (325 mg) by mouth every other day for 60 days  Qty: 30 tablet, Refills: 0    Associated Diagnoses: Anemia due to blood loss, acute      ibuprofen (ADVIL/MOTRIN) 600 MG tablet Take 1 tablet (600 mg) by mouth every 6 hours as needed for moderate pain Start after delivery    Associated Diagnoses:  (normal spontaneous vaginal delivery)      senna-docusate (SENOKOT-S/PERICOLACE) 8.6-50 MG tablet Take 1 tablet by mouth daily for 30 days Start after delivery.    Associated Diagnoses:  (normal spontaneous vaginal delivery)      vitamin C (ASCORBIC ACID) 250 MG tablet Take 1 tablet (250 mg) by mouth daily for 60 days  Qty: 60 tablet, Refills: 0    Associated Diagnoses: Anemia due to blood loss, acute           CONTINUE these medications which have NOT CHANGED    Details   Prenatal Vit-Fe Fumarate-FA (PNV PRENATAL PLUS MULTIVITAMIN) 27-1 MG TABS per tablet            STOP taking these medications       aspirin (ASA) 81 MG  chewable tablet Comments:   Reason for Stopping:             EDUARDA Bartholomew CNM, APRN CNM,  TONNY

## 2024-07-07 NOTE — PLAN OF CARE
Goal Outcome Evaluation:  Shift [id number]  VS and fundal check due at 2200, pain 2/10. Fundal check completed with urias placement, Funus U2, scant, rubra lochia.  LS clear on RA. Good PO intake, good appetite. Denies PreE Symptoms at this time. Patient is having difficulty emptying bladder fully(urinatry retention), discussed ways to encourage voiding in bathroom (blowing bubbles into water bottle, peppermint oil, standing and repositioning) Urias placed at 1800, will remain in for at least 12-hours of bladder rest, passing gas. Taking IBU and tylenol as needed. Bonding well with infant in room. Helping with infant cares. Patient is breast feeding followed by hand expressing every 2-3 hours. Birth Certificate turned in, EDs score 2. Hourly monitoring completed.     Problem: Adult Inpatient Plan of Care  Goal: Plan of Care Review  Description: The Plan of Care Review/Shift note should be completed every shift.  The Outcome Evaluation is a brief statement about your assessment that the patient is improving, declining, or no change.  This information will be displayed automatically on your shift  note.  7/7/2024 1616 by Dottie Cross RN  Outcome: Progressing  7/7/2024 1615 by Dottie Cross RN  Outcome: Progressing  Goal: Absence of Hospital-Acquired Illness or Injury  Intervention: Prevent Skin Injury  Recent Flowsheet Documentation  Taken 7/7/2024 1540 by Dottie Cross RN  Body Position: position changed independently  Intervention: Prevent and Manage VTE (Venous Thromboembolism) Risk  Recent Flowsheet Documentation  Taken 7/7/2024 1540 by Dottie Cross RN  VTE Prevention/Management: (encouraged fluids and ambulation) other (see comments)  Intervention: Prevent Infection  Recent Flowsheet Documentation  Taken 7/7/2024 1540 by Dottie Cross RN  Infection Prevention:   cohorting utilized   personal protective equipment utilized   rest/sleep promoted   hand hygiene promoted  Goal: Optimal Comfort and  Wellbeing  7/7/2024 1616 by Dottie Cross RN  Outcome: Progressing  7/7/2024 1615 by Dottie Cross RN  Outcome: Progressing  Intervention: Provide Person-Centered Care  Recent Flowsheet Documentation  Taken 7/7/2024 1540 by Dottie Cross RN  Trust Relationship/Rapport:   care explained   choices provided   emotional support provided   empathic listening provided   questions answered   questions encouraged   reassurance provided   thoughts/feelings acknowledged  Goal: Readiness for Transition of Care  7/7/2024 1616 by Dottie Cross RN  Outcome: Progressing  7/7/2024 1615 by Dottie Cross RN  Outcome: Progressing     Problem: Postpartum (Vaginal Delivery)  Goal: Successful Parent Role Transition  Outcome: Progressing  Goal: Absence of Infection Signs and Symptoms  Intervention: Prevent or Manage Infection  Recent Flowsheet Documentation  Taken 7/7/2024 1540 by Dottie Cross RN  Infection Management: aseptic technique maintained  Goal: Anesthesia/Sedation Recovery  Outcome: Met  Goal: Optimal Pain Control and Function  Outcome: Progressing  Goal: Effective Urinary Elimination  Outcome: Progressing

## 2024-07-07 NOTE — PROGRESS NOTES
Anesthesia Post-Partum Follow-Up Note After Vaginal Delivery with Epidural    Patient: Donna Craven    Patient location: Post-partum floor    Anesthesia type: Epidural    Subjective  Donna Craven does not complain of pruritis at this time. She denies weakness, denies paresthesia, denies difficulties breathing or voiding, denies nausea or vomiting, and denies headache. She is able to ambulate and tolerates regular diet. Patient endorsed an overall positive anesthesia experience.    Objective  Respiratory Function (RR / SpO2 / Airway Patency): Satisfactory  Cardiac Function (HR / Rhythm / BP): Satisfactory  Strength and sensation lower extremities: Normal  Site of epidural insertion: No signs of infection or inflammation    Most recent vitals  /72 (BP Location: Left arm, Patient Position: Semi-Cantrell's, Cuff Size: Adult Regular)   Pulse 72   Temp 36.6  C (97.8  F) (Oral)   Resp 16   Wt 94.3 kg (208 lb)   LMP 2023   SpO2 98%   BMI 34.88 kg/m      Assessment and plan  Donna Craven is a 38 year old female  post-partum #2 s/p vaginal delivery. An epidural catheter was successfully inserted  and provided labor analgesia via programmed intermittent bolus pump infusing 0.1% ropivacaine and 2 mcg/ml fentanyl, in addition to patient-controlled epidural analgesia and physician hand boluses as needed. The patient delivered via  and the epidural catheter was removed immediately thereafter by the L&D RN.     At this time, there is no evidence of adverse side effects associated with the insertion or removal of the epidural catheter. If the patient develops new lower extremity paresis or paresthesias, or if there are concerns regarding the insertion site of the catheter, please reach out to the anesthesia department OB division [phone]    Thank you for including us in the care for this patient. Anesthesia is signing off at this time.     Frank Andres MD  Anesthesiology Resident, CA-3

## 2024-07-08 ENCOUNTER — MYC MEDICAL ADVICE (OUTPATIENT)
Dept: MIDWIFE SERVICES | Facility: CLINIC | Age: 39
End: 2024-07-08
Payer: COMMERCIAL

## 2024-07-08 VITALS
WEIGHT: 207.5 LBS | SYSTOLIC BLOOD PRESSURE: 126 MMHG | TEMPERATURE: 97.9 F | HEART RATE: 87 BPM | RESPIRATION RATE: 16 BRPM | DIASTOLIC BLOOD PRESSURE: 85 MMHG | BODY MASS INDEX: 34.8 KG/M2 | OXYGEN SATURATION: 98 %

## 2024-07-08 DIAGNOSIS — N81.89 PELVIC FLOOR WEAKNESS: Primary | ICD-10-CM

## 2024-07-08 PROCEDURE — 250N000013 HC RX MED GY IP 250 OP 250 PS 637: Performed by: ADVANCED PRACTICE MIDWIFE

## 2024-07-08 RX ADMIN — ACETAMINOPHEN 650 MG: 325 TABLET, FILM COATED ORAL at 06:02

## 2024-07-08 RX ADMIN — ACETAMINOPHEN 650 MG: 325 TABLET, FILM COATED ORAL at 02:33

## 2024-07-08 RX ADMIN — ACETAMINOPHEN 650 MG: 325 TABLET, FILM COATED ORAL at 13:13

## 2024-07-08 RX ADMIN — IBUPROFEN 800 MG: 800 TABLET, FILM COATED ORAL at 13:13

## 2024-07-08 RX ADMIN — DOCUSATE SODIUM 100 MG: 100 CAPSULE, LIQUID FILLED ORAL at 08:32

## 2024-07-08 RX ADMIN — IBUPROFEN 800 MG: 800 TABLET, FILM COATED ORAL at 06:02

## 2024-07-08 ASSESSMENT — ACTIVITIES OF DAILY LIVING (ADL)
ADLS_ACUITY_SCORE: 19

## 2024-07-08 NOTE — CONFIDENTIAL NOTE
I discussed with her inpatient. Can you help her figure out getting her disability forms attached to [employer] or sent via email to us. I ordered the PT referral just to help her get scheduled but we discussed not starting until 6 weeks postpartum at least. Thanks! EDUARDA Keller CNM

## 2024-07-08 NOTE — CONSULTS
"SPIRITUAL HEALTH SERVICES Consult Note  [facility] ([facility]    Met with Silvino as he soothed baby Cathy and Donna was refreshing in the bathroom. Silvino shared that they are Sikh and requested communion yesterday \"to fulfill Evangelical obligations\" but would still appreciate communion and a blessing today. He reports they will discharge later this afternoon/evening. I will arrange with the on-call .     Carrie Hernandez M.Div.  Chaplain Resident  Pager [phone]  Reachable via Audioscribe    * [facility] remains available 24/7 for emergent requests/referrals, either by having the switchboard page the on-call  or by entering an ASAP/STAT consult in Epic (this will also page the on-call ). Routine Epic consults receive an initial response within 24 hours.*    "

## 2024-07-08 NOTE — PLAN OF CARE
Goal Outcome Evaluation: VSS and postpartum assessments WDL. Scant rubra lochia, Fundus U2. Up ad deidre with steady gait and independent with cares. Castellanos removed at 0600.  Bonding well with infant. Breastfeeding on cue every 2-3 hours and hand expressing after. Independently getting infant to latch. Pain managed with PRN Tylenol and ibuprofen.   present and supportive.  Will continue with postpartum cares and education per plan of care.      Plan of Care Reviewed With: patient    Overall Patient Progress: improvingOverall Patient Progress: improving       Problem: Adult Inpatient Plan of Care  Goal: Optimal Comfort and Wellbeing  Outcome: Progressing  Intervention: Provide Person-Centered Care  Recent Flowsheet Documentation  Taken 7/7/2024 2240 by Salma Tejada, RN  Trust Relationship/Rapport:   care explained   choices provided   emotional support provided   empathic listening provided   questions answered   questions encouraged   reassurance provided   thoughts/feelings acknowledged     Problem: Postpartum (Vaginal Delivery)  Goal: Optimal Pain Control and Function  Outcome: Progressing  Intervention: Prevent or Manage Pain  Recent Flowsheet Documentation  Taken 7/7/2024 2240 by Salma Tejada, RN  Perineal Care: perineum cleansed     Problem: Postpartum (Vaginal Delivery)  Goal: Effective Urinary Elimination  Outcome: Progressing

## 2024-07-08 NOTE — DISCHARGE INSTRUCTIONS
Warning Signs after Having a Baby    Keep this paper on your fridge or somewhere else where you can see it.    Call your provider if you have any of these symptoms up to 12 weeks after having your baby.    Thoughts of hurting yourself or your baby  Pain in your chest or trouble breathing  Severe headache not helped by pain medicine  Eyesight concerns (blurry vision, seeing spots or flashes of light, other changes to eyesight)  Fainting, shaking or other signs of a seizure    Call 9-1-1 if you feel that it is an emergency.     The symptoms below can happen to anyone after giving birth. They can be very serious. Call your provider if you have any of these warning signs.    My provider s phone number: _______________________    Losing too much blood (hemorrhage)    Call your provider if you soak through a pad in less than an hour or pass blood clots bigger than a golf ball. These may be signs that you are bleeding too much.    Blood clots in the legs or lungs    After you give birth, your body naturally clots its blood to help prevent blood loss. Sometimes this increased clotting can happen in other areas of the body, like the legs or lungs. This can block your blood flow and be very dangerous.     Call your provider if you:  Have a red, swollen spot on the back of your leg that is warm or painful when you touch it.   Are coughing up blood.     Infection    Call your provider if you have any of these symptoms:  Fever of 100.4 F (38 C) or higher.  Pain or redness around your stitches if you had an incision.   Any yellow, white, or green fluid coming from places where you had stitches or surgery.    Mood Problems (postpartum depression)    Many people feel sad or have mood changes after having a baby. But for some people, these mood swings are worse.     Call your provider right away if you feel so anxious or nervous that you can't care for yourself or your baby.    Preeclampsia (high blood pressure)    Even if you  didn't have high blood pressure when you were pregnant, you are at risk for the high blood pressure disease called preeclampsia. This risk can last up to 12 weeks after giving birth.     Call your provider if you have:   Pain on your right side under your rib cage  Sudden swelling in the hands and face    Remember: You know your body. If something doesn't feel right, get medical help.     For informational purposes only. Not to replace the advice of your health care provider. Copyright 2020 [facility]. All rights reserved. Clinically reviewed by Elaine Hernández, RNC-OB, MSN. RightSignature [id number] - Rev 02/23.

## 2024-07-08 NOTE — DISCHARGE SUMMARY
[facility]    Discharge Summary  Obstetrics    Date of Admission:  2024  Date of Discharge:  2024  Discharging Provider: EDUARDA Keller CNCECILIA    Discharge Diagnoses   (O80)  (normal spontaneous vaginal delivery)  (primary encounter diagnosis)  Plan: Breast pump - Manual/Electric, acetaminophen         (TYLENOL) 325 MG tablet, ibuprofen         (ADVIL/MOTRIN) 600 MG tablet, senna-docusate         (SENOKOT-S/PERICOLACE) 8.6-50 MG tablet,         Postpartum Home Care Referral    (D62) Anemia due to blood loss, acute  Plan: ferrous sulfate (FEROSUL) 325 (65 Fe) MG         tablet, vitamin C (ASCORBIC ACID) 250 MG         tablet, cyanocobalamin (VITAMIN B-12) 1000 MCG         tablet      History of Present Illness   Donna Craven is a 38 year old female who presented with IOL for postdates. She had triple I infection otherwise uncomplicated labor. She has urinary retention postpartum requiring a urias catheter placed twice over her course here. It was last removed at 6 am today and plans to attempt void this AM. Discussed potential for going home with a urisa if urinary retention continues throughout today. Will plan discharge this afternoon/evening. She would like to do pelvic floor PT, will plan to order that at 6 weeks postpartum. She has all her meds at home. Discharge instructions reviewed. Plans NFP and condoms for BC. Plans to follow up at 6 weeks postpartum.     Hospital Course   The patient's hospital course was unremarkable except stated above.  She recovered as anticipated and experienced urinary retention post-delivery complication. On discharge, her pain was well controlled. Vaginal bleeding is similar to peak menstrual flow. Ambulating well and tolerating a normal diet.  No fevers.  Breastfeeding well.  Infant is stable.  She was discharged on post-partum day #2.    Post-partum hemoglobin:   Hemoglobin   Date Value Ref Range Status    2024 10.8 (L) 11.7 - 15.7 g/dL Final       [place] Depression Scale  Thoughts of Harming Self: Never  Total Score: 2      KieshaEDUARDA Quinn CNM    Discharge Disposition   Discharged to home   Condition at discharge: Stable    Primary Care Physician   Isadora Olmedo MD    Consultations This Hospital Stay   NURSING TO CONSULT FOR VASCULAR ACCESS CARE IP CONSULT  LACTATION IP CONSULT  SPIRITUAL HEALTH SERVICES IP CONSULT  ANESTHESIOLOGY IP CONSULT    Discharge Orders      Postpartum Home Care Referral      Activity    Activity as tolerated     Reason for your hospital stay    Maternity care     Follow Up    Follow up with provider in 2 weeks and 6 weeks for post-delivery checks     Breast pump - Manual/Electric    Breast Pump Documentation:  Manual/Electric Pump: To support adequate breast milk production and nutrition for infant.     I, the undersigned, certify that the above prescribed supplies are medically necessary for this patient and is both reasonable and necessary in reference to accepted standards of medical and necessary in reference to accepted standards of medical practice in the treatment of this patient's condition and is not prescribed as a convenience.     Diet    Resume previous diet     Discharge Medications   Current Discharge Medication List        START taking these medications    Details   acetaminophen (TYLENOL) 325 MG tablet Take 2 tablets (650 mg) by mouth every 6 hours as needed for mild pain Start after Delivery.    Associated Diagnoses:  (normal spontaneous vaginal delivery)      cyanocobalamin (VITAMIN B-12) 1000 MCG tablet Take 1 tablet (1,000 mcg) by mouth daily for 60 days  Qty: 60 tablet, Refills: 0    Associated Diagnoses: Anemia due to blood loss, acute      ferrous sulfate (FEROSUL) 325 (65 Fe) MG tablet Take 1 tablet (325 mg) by mouth every other day for 60 days  Qty: 30 tablet, Refills: 0    Associated Diagnoses: Anemia due to blood loss, acute       ibuprofen (ADVIL/MOTRIN) 600 MG tablet Take 1 tablet (600 mg) by mouth every 6 hours as needed for moderate pain Start after delivery    Associated Diagnoses:  (normal spontaneous vaginal delivery)      senna-docusate (SENOKOT-S/PERICOLACE) 8.6-50 MG tablet Take 1 tablet by mouth daily for 30 days Start after delivery.    Associated Diagnoses:  (normal spontaneous vaginal delivery)      vitamin C (ASCORBIC ACID) 250 MG tablet Take 1 tablet (250 mg) by mouth daily for 60 days  Qty: 60 tablet, Refills: 0    Associated Diagnoses: Anemia due to blood loss, acute           CONTINUE these medications which have NOT CHANGED    Details   Prenatal Vit-Fe Fumarate-FA (PNV PRENATAL PLUS MULTIVITAMIN) 27-1 MG TABS per tablet            STOP taking these medications       aspirin (ASA) 81 MG chewable tablet Comments:   Reason for Stopping:             Allergies   No Known Allergies

## 2024-07-08 NOTE — LACTATION NOTE
This note was copied from a baby's chart.  Follow Up Consult    Maternal Assessment: Donna shares she is feeling well. She was able to get a little sleep last night.       Infant Assessment:  Cathy has age appropriate output and weight loss.      Weight Change Since Birth: -8% at 2 day old      Feeding History: Parents share that Cathy did some cluster feeding again overnight but less than the night before      Feeding Assessment: Donna had Cathy latched in the football position on the left breast when I arrived in the room. She appeared to have a deep latch, demonstrated sustained, coordinated sucking and swallows noted during the feeding. Donna has general nipple tenderness but denies pain with latch.      Education:   [] Expected  feeding patterns in the first few days (pg. 38 of Your Guide to To Postpartum and [employer] Care)/ the Second Night  [x] Stages of milk production  [x] Benefits of hand expression of colostrum  [x] Early feeding cues     [x] Benefits of feeding on cue  [x] Benefits of skin to skin  [] Breastfeeding positions  [] Tips to get and maintain a deep latch  [] Nutritive vs.non-nutritive sucking  [] Gentle breast compressions as needed to enhance milk transfer  [x] How to tell when baby is finished  [x] How to tell if baby is getting enough  [x] Expected  output  [x]  weight loss  [x] Infant Feeding Log  [] [employer] Breastfeeding/Pumping videos  [x] Signs breastfeeding is going well (comfortable latch, audible swallows, age appropriate output and weight loss)    [] Tips to prevent engorgement  [] Signs of engorgement  [] Tips to manage engorgement  [] Pumping recommendations (based on patient need)  [] [facility] breast pump part/infant feeding supplies cleaning recommendations  [] Inpatient breastfeeding support  [x] Outpatient lactation resources    Handouts: Infant Feeding Log (Week 1, Your Guide to Postpartum & [employer] Care Book)    Home Breast Pump: Has a Spectra  pump at home    Plan: Likely discharging to home today. Encouraged continued breastfeeding on demand with a goal of at least 8 feedings per day. Encouraged logging feeding and output on the  Feeding Log.      Encouraged follow up with outpatient lactation consultant  as needed after discharge. Family plans to follow up with [facility].       Kimberly Marrero RN, IBCLC   Lactation Consultant  [place]: [facility] [phone]  Office: [phone]

## 2024-07-08 NOTE — PLAN OF CARE
Goal Outcome Evaluation:      Plan of Care Reviewed With: patient, spouse    Overall Patient Progress: improvingOverall Patient Progress: improving    Data: Vital signs within normal limits. Postpartum checks within normal limits - see flow record. Patient eating and drinking normally. Patient able to empty bladder independently and is up ambulating.  Patient has been voidng often and was bladder scanned. No apparent signs of infection.  Perineum  healing well. Patient performing self cares and is able to care for infant. Patient is breastfeeding infant.   Action: Patient medicated during the shift for pain. See MAR. Patient reassessed within 1 hour after each medication and pain was improved - patient stated she was comfortable. Patient education done about discharge education. See flow record.  Response: Positive attachment behaviors observed with infant. Support persons was attentive and present.   Plan: Anticipate discharge.

## 2024-07-09 ENCOUNTER — MEDICAL CORRESPONDENCE (OUTPATIENT)
Dept: HEALTH INFORMATION MANAGEMENT | Facility: CLINIC | Age: 39
End: 2024-07-09
Payer: COMMERCIAL

## 2024-07-10 NOTE — TELEPHONE ENCOUNTER
PAPERWORK REQUEST    Form received on: 07/10/2024  How was form received: Brian  Preferred Provider: Faith Huffman CNM  Type of form: LA  Date needed by: Standard  What to do with form once completed: mycahrt sent to confirm  Where was form placed?: provider basket  Has an HEATHER been signed? Not Applicable    Additional Notes: Filled out and placed in provider basket for signature

## 2024-08-01 ENCOUNTER — MYC REFILL (OUTPATIENT)
Dept: FAMILY MEDICINE | Facility: CLINIC | Age: 39
End: 2024-08-01
Payer: COMMERCIAL

## 2024-08-01 DIAGNOSIS — D62 ANEMIA DUE TO BLOOD LOSS, ACUTE: ICD-10-CM

## 2024-08-02 RX ORDER — FERROUS SULFATE 325(65) MG
325 TABLET ORAL EVERY OTHER DAY
Qty: 30 TABLET | Refills: 0 | OUTPATIENT
Start: 2024-08-02

## 2024-08-02 RX ORDER — LANOLIN ALCOHOL/MO/W.PET/CERES
1000 CREAM (GRAM) TOPICAL DAILY
Qty: 60 TABLET | Refills: 0 | OUTPATIENT
Start: 2024-08-02

## 2024-08-02 RX ORDER — MULTIVIT WITH MINERALS/LUTEIN
250 TABLET ORAL DAILY
Qty: 60 TABLET | Refills: 0 | OUTPATIENT
Start: 2024-08-02

## 2024-08-19 ENCOUNTER — PRENATAL OFFICE VISIT (OUTPATIENT)
Dept: MIDWIFE SERVICES | Facility: CLINIC | Age: 39
End: 2024-08-19
Payer: COMMERCIAL

## 2024-08-19 VITALS
SYSTOLIC BLOOD PRESSURE: 135 MMHG | HEART RATE: 89 BPM | WEIGHT: 196 LBS | BODY MASS INDEX: 32.87 KG/M2 | DIASTOLIC BLOOD PRESSURE: 81 MMHG | OXYGEN SATURATION: 98 %

## 2024-08-19 PROBLEM — D62 ANEMIA DUE TO BLOOD LOSS, ACUTE: Status: RESOLVED | Noted: 2024-07-06 | Resolved: 2024-08-19

## 2024-08-19 PROCEDURE — 99207 PR POST PARTUM EXAM: CPT | Performed by: ADVANCED PRACTICE MIDWIFE

## 2024-08-19 ASSESSMENT — EDINBURGH POSTNATAL DEPRESSION SCALE (EPDS)
I HAVE LOOKED FORWARD WITH ENJOYMENT TO THINGS: AS MUCH AS I EVER DID
I HAVE BLAMED MYSELF UNNECESSARILY WHEN THINGS WENT WRONG: NO, NEVER
I HAVE BEEN SO UNHAPPY THAT I HAVE BEEN CRYING: NO, NEVER
TOTAL SCORE: 0
I HAVE BEEN SO UNHAPPY THAT I HAVE HAD DIFFICULTY SLEEPING: NOT AT ALL
I HAVE FELT SAD OR MISERABLE: NO, NOT AT ALL
I HAVE BEEN ANXIOUS OR WORRIED FOR NO GOOD REASON: NO, NOT AT ALL
THE THOUGHT OF HARMING MYSELF HAS OCCURRED TO ME: NEVER
I HAVE BEEN ABLE TO LAUGH AND SEE THE FUNNY SIDE OF THINGS: AS MUCH AS I ALWAYS COULD
I HAVE FELT SCARED OR PANICKY FOR NO GOOD REASON: NO, NOT AT ALL
THINGS HAVE BEEN GETTING ON TOP OF ME: NO, I HAVE BEEN COPING AS WELL AS EVER

## 2024-08-19 NOTE — PROGRESS NOTES
Donna is here for a 6-week postpartum checkup.    She had a  of a viable girl, weight 7 pounds 10 oz., with complication of triple I infection during labor and urinary retention postpartum. She was discharged home voiding independently. Date of delivery was 24. Since delivery, she has been breast feeding.  She has No signs of infection, bleeding or other complications.  She is not pregnant.  We discussed contraceptions and she has chosen natural family planning. Working with an educator and has Clear blue monitor to monitor fertility.      Has pelvic floor PT appointment scheduled    Type of Delivery:  Vaginal  Feeding Method:  Breast      REVIEW OF SYSTEMS:  Ears/Nose/Throat: negative  Respiratory: negative  Cardiovascular: negative  Gastrointestinal: negative  Genitourinary: negative  Musculoskeletal: negative    Neurologic: negative   Skin: negative   Endocrine:  negative  Vagina: negative  Cervix: negative  Breasts: negative  Vulva: negative  Episiotomy: negative  Contraception Plan: natural family planning  Medical History Reviewed yes   Family History Reviewed yes   Problem List Updated yes     EXAM:    HEENT: grossly normal.  NECK: no lymphadenopathy or thyroidomegaly.  LUNGS: CTA X 2, no rales or crackles.  BACK: No spinal or CVA tenderness.  HEART: RRR without murmurs clicks or gallops.  ABDOMEN: soft, non tender, good bowel sounds, without masses   rebound, guarding or tenderness.  PELVIC:  External genitalia; normal without lesion, repair well healed.         Vagina: normal mucosa and rugae, no discharge.  Cervix: multiparous, well healed, without lesion.  Uterus: non pregnant in size, firm , mobile, no lesions, Normal shape, position and consistency. Adnexa: non tender, without masses.    EXTREMITIES:  warm to touch, good pulses, no ankle edema or calf tenderness.  NEUROLOGIC: grossly normal.    ASSESSMENT:   6-week postpartum exam after .    PLAN:  Follow up in 1 year. Natural family  planning for contraception.

## 2024-09-09 ENCOUNTER — THERAPY VISIT (OUTPATIENT)
Dept: PHYSICAL THERAPY | Facility: CLINIC | Age: 39
End: 2024-09-09
Attending: ADVANCED PRACTICE MIDWIFE
Payer: COMMERCIAL

## 2024-09-09 DIAGNOSIS — N81.89 PELVIC FLOOR WEAKNESS: ICD-10-CM

## 2024-09-09 DIAGNOSIS — M62.89 PELVIC FLOOR DYSFUNCTION: ICD-10-CM

## 2024-09-09 DIAGNOSIS — N39.8 VOIDING DYSFUNCTION: Primary | ICD-10-CM

## 2024-09-09 PROCEDURE — 97530 THERAPEUTIC ACTIVITIES: CPT | Mod: GP | Performed by: PHYSICAL THERAPIST

## 2024-09-09 PROCEDURE — 97161 PT EVAL LOW COMPLEX 20 MIN: CPT | Mod: GP | Performed by: PHYSICAL THERAPIST

## 2024-09-09 PROCEDURE — 97110 THERAPEUTIC EXERCISES: CPT | Mod: GP | Performed by: PHYSICAL THERAPIST

## 2024-09-09 NOTE — PROGRESS NOTES
PHYSICAL THERAPY EVALUATION  Type of Visit: Evaluation       Fall Risk Screen:  Fall screen completed by: PT  Have you fallen 2 or more times in the past year?: No  Have you fallen and had an injury in the past year?: No  Is patient a fall risk?: No    Subjective       Presenting condition or subjective complaint: Patient reports difficulty fully emptying her bladder following the birth of her first child on 7/6/24. She reports mild constipation post partum, needing to strain to empty bowel. She did have mild discomfort during intercourse post partum.  Date of onset: 07/08/24 (MD order for PT)    Relevant medical history: Currently pregnant or breastfeeding; Pregnant or breastfeeding   Dates & types of surgery:      Prior diagnostic imaging/testing results:       Prior therapy history for the same diagnosis, illness or injury: No      Prior Level of Function  Transfers:   Ambulation:   ADL:   IADL:     Living Environment  Social support: With a significant other or spouse   Type of home: House   Stairs to enter the home: Yes 3 Is there a railing: Yes     Ramp: No   Stairs inside the home: Yes 10 Is there a railing: Yes     Help at home: None  Equipment owned:       Employment: Yes nurse  Hobbies/Interests:      Patient goals for therapy:      Pain assessment: none     Objective      PELVIC EVALUATION  ADDITIONAL HISTORY:  Sex assigned at birth: Female  Gender identity: Female    Pronouns: She/Her Hers      Bladder History:  Feels bladder filling: Yes  Triggers for feeling of inability to wait to go to the bathroom: No    How long can you wait to urinate: 5 plus hours  Gets up at night to urinate: Yes 1 but it is usually because Im already up breastfeeding  Can stop the flow of urine when urinating: Yes  Volume of urine usually released: Average   Other issues: Trouble emptying bladder completely  Number of bladder infections in last 12 months:    Fluid intake per day: 2000 400 12 ounces occasionally, at most  weekly  Medications taken for bladder: No     Activities causing urine leak:      Amount of urine typically leaked:    Pads used to help with leaking: No        Bowel History:  Frequency of bowel movement: Daily  Consistency of stool: Soft-formed    Ignores the urge to defecate: No  Other bowel issues: Straining to have bowel movement  Length of time spent trying to have a bowel movement: 15 minutes    Sexual Function History:  Sexual orientation: Straight    Sexually active: Yes  Lubrication used: Yes Yes  Pelvic pain: Initial penetration (rectal or vaginal)    Pain or difficulty with orgasms/erection/ejaculation: No    State of menopause: Perimenopause (have not gone through menopause yet)  Hormone medications: No      Are you currently pregnant: No  Number of previous pregnancies: 1  Number of deliveries: 7/6/24  If you have delivered before, did you have any of these issues during delivery: Tearing; Vaginal delivery  Have you been diagnosed with pelvic prolapse or abdominal separation: No  Do you get regular exercise: No  Have you tried pelvic floor strengthening exercises for 4 weeks: No  Do you have any history of trauma that is relevant to your care that you d like to share: No      Discussed reason for referral regarding pelvic health needs and external/internal pelvic floor muscle examination with patient/guardian.  Opportunity provided to ask questions and verbal consent for assessment and intervention was given.    PAIN: Pain Level at Rest: 0/10  Pain Level with Use: 2/10  Pain Location: vaginal  Pain Quality: Dull  Pain Frequency: intermittent  Pain is Worst: NA  Pain is Exacerbated By: intercourse  Pain is Relieved By: after intercourse no pain  Pain Progression: Unchanged  POSTURE:   LUMBAR SCREEN:   HIP SCREEN:  Strength:    Functional Strength Testing:     PELVIC/SI SCREEN:     PAIN PROVOCATION TEST:   PELVIS/SI SPECIAL TESTS:   BREATHING SYMMETRY:     PELVIC EXAM  External Visual Inspection:  At  "rest: Normal  With voluntary pelvic floor contraction: Uncertain  Relaxation of PFM: Partial/delayed relaxation  With intra-abdominal pressure: Bearing down as defecation: Perineal descent    Integumentary:   Introitus: Unremarkable    External Digital Palpation per Perineum:   Transverse perineal: Tenderness  Levator ani: Tenderness  Perineal body: Tenderness    Scar:   Location/Type:   Mobility:     Internal Digital Palpation:  Per Vagina:  Myofascial Resistance to Palpation: Firm  Digital Muscle Performance: P (Power): 2-/5  E (Endurance): 2\"  R (Repetitions): 4  F (Fast Twitch): 4  Compensations: Abdominals, Breath holding  Relaxation Post-Contraction: Partial/delayed relaxation    Per Rectum:        Pelvic Organ Prolapse:       ABDOMINAL ASSESSMENT  Diastasis Rectus Abdominis (MIKE):  MIKE presence: No    Abdominal Activation/Strength:     Scar:   Location/Type:   Mobility:     Fascial Tension/Restriction:     BIOFEEDBACK:  Position:   Surface Electrodes:     Abdominals:     Perianals:       DERMATOMES:   DTR S:     Assessment & Plan   CLINICAL IMPRESSIONS  Medical Diagnosis: Pelvic floor weakness    Treatment Diagnosis: PF weakness, voiding dysfunction, PF dysfunction   Impression/Assessment: Patient is a 39 year old female with pelvic floor weakness complaints.  The following significant findings have been identified: Pain, Decreased strength, and Impaired muscle performance. These impairments interfere with their ability to perform self care tasks as compared to previous level of function.     Clinical Decision Making (Complexity):  Clinical Presentation: Stable/Uncomplicated  Clinical Presentation Rationale: based on medical and personal factors listed in PT evaluation  Clinical Decision Making (Complexity): Low complexity    PLAN OF CARE  Treatment Interventions:  Modalities: Biofeedback  Interventions: Manual Therapy, Neuromuscular Re-education, Therapeutic Activity, Therapeutic Exercise, Self-Care/Home " "Management    Long Term Goals     PT Goal 1  Goal Identifier: pelvic floor muscle strength, endurance, coordination  Goal Description: Increase strength to MMT: 3+/5, 5-8\" hold, full relaxation (baseline: MMT 2-/5, 2\" hold, partially delayed relaxation)  Rationale: to maximize safety and independence with performance of ADLs and functional tasks;to maximize safety and independence with self cares (to allow full bladder emptying)  Target Date: 12/02/24      Frequency of Treatment: 1x week every other week for  Duration of Treatment: 12 weeks    Recommended Referrals to Other Professionals: Physical Therapy  Education Assessment:   Learner/Method: Patient;Listening;Pictures/Video    Risks and benefits of evaluation/treatment have been explained.   Patient/Family/caregiver agrees with Plan of Care.     Evaluation Time:     PT Eval, Low Complexity Minutes (47038): 25       Signing Clinician: Laura Pandya PT            "

## 2024-09-11 ENCOUNTER — MEDICAL CORRESPONDENCE (OUTPATIENT)
Dept: HEALTH INFORMATION MANAGEMENT | Facility: CLINIC | Age: 39
End: 2024-09-11
Payer: COMMERCIAL

## 2024-09-16 ENCOUNTER — THERAPY VISIT (OUTPATIENT)
Dept: PHYSICAL THERAPY | Facility: CLINIC | Age: 39
End: 2024-09-16
Attending: ADVANCED PRACTICE MIDWIFE
Payer: COMMERCIAL

## 2024-09-16 DIAGNOSIS — N81.89 PELVIC FLOOR WEAKNESS: Primary | ICD-10-CM

## 2024-09-16 DIAGNOSIS — M62.89 PELVIC FLOOR DYSFUNCTION: ICD-10-CM

## 2024-09-16 DIAGNOSIS — N39.8 VOIDING DYSFUNCTION: ICD-10-CM

## 2024-09-16 PROCEDURE — 97530 THERAPEUTIC ACTIVITIES: CPT | Mod: GP | Performed by: PHYSICAL THERAPIST

## 2024-09-16 PROCEDURE — 97110 THERAPEUTIC EXERCISES: CPT | Mod: GP | Performed by: PHYSICAL THERAPIST

## 2024-09-30 ENCOUNTER — THERAPY VISIT (OUTPATIENT)
Dept: PHYSICAL THERAPY | Facility: CLINIC | Age: 39
End: 2024-09-30
Payer: COMMERCIAL

## 2024-09-30 DIAGNOSIS — N39.8 VOIDING DYSFUNCTION: ICD-10-CM

## 2024-09-30 DIAGNOSIS — N81.89 PELVIC FLOOR WEAKNESS: Primary | ICD-10-CM

## 2024-09-30 DIAGNOSIS — M62.89 PELVIC FLOOR DYSFUNCTION: ICD-10-CM

## 2024-09-30 PROCEDURE — 97140 MANUAL THERAPY 1/> REGIONS: CPT | Mod: GP | Performed by: PHYSICAL THERAPIST

## 2024-09-30 PROCEDURE — 97530 THERAPEUTIC ACTIVITIES: CPT | Mod: GP | Performed by: PHYSICAL THERAPIST

## 2024-09-30 PROCEDURE — 97110 THERAPEUTIC EXERCISES: CPT | Mod: GP | Performed by: PHYSICAL THERAPIST

## 2024-10-02 ENCOUNTER — IMMUNIZATION (OUTPATIENT)
Dept: FAMILY MEDICINE | Facility: CLINIC | Age: 39
End: 2024-10-02
Payer: COMMERCIAL

## 2024-10-02 DIAGNOSIS — Z23 ENCOUNTER FOR IMMUNIZATION: Primary | ICD-10-CM

## 2024-10-02 PROCEDURE — 91320 SARSCV2 VAC 30MCG TRS-SUC IM: CPT

## 2024-10-02 PROCEDURE — 90471 IMMUNIZATION ADMIN: CPT

## 2024-10-02 PROCEDURE — 99207 PR NO CHARGE NURSE ONLY: CPT

## 2024-10-02 PROCEDURE — 90480 ADMN SARSCOV2 VAC 1/ONLY CMP: CPT

## 2024-10-02 PROCEDURE — 90656 IIV3 VACC NO PRSV 0.5 ML IM: CPT

## 2024-10-22 ENCOUNTER — OFFICE VISIT (OUTPATIENT)
Dept: OPTOMETRY | Facility: CLINIC | Age: 39
End: 2024-10-22
Payer: COMMERCIAL

## 2024-10-22 DIAGNOSIS — H52.12 MYOPIA OF LEFT EYE: ICD-10-CM

## 2024-10-22 DIAGNOSIS — H52.01 HYPEROPIA OF RIGHT EYE: ICD-10-CM

## 2024-10-22 DIAGNOSIS — Z01.00 EXAMINATION OF EYES AND VISION: Primary | ICD-10-CM

## 2024-10-22 DIAGNOSIS — Q12.0: ICD-10-CM

## 2024-10-22 PROCEDURE — 92004 COMPRE OPH EXAM NEW PT 1/>: CPT | Performed by: OPTOMETRIST

## 2024-10-22 PROCEDURE — 92015 DETERMINE REFRACTIVE STATE: CPT | Performed by: OPTOMETRIST

## 2024-10-22 ASSESSMENT — CUP TO DISC RATIO
OD_RATIO: 0.4
OS_RATIO: 0.5

## 2024-10-22 ASSESSMENT — VISUAL ACUITY
OS_SC: 20/20
METHOD: SNELLEN - LINEAR
OS_SC+: -3
OD_SC: 20/25-1
OS_SC: 20/25
OD_SC: 20/25

## 2024-10-22 ASSESSMENT — TONOMETRY
OS_IOP_MMHG: 21.9
IOP_METHOD: ICARE
OD_IOP_MMHG: 21.8

## 2024-10-22 ASSESSMENT — SLIT LAMP EXAM - LIDS
COMMENTS: NORMAL
COMMENTS: NORMAL

## 2024-10-22 ASSESSMENT — CONF VISUAL FIELD
OS_SUPERIOR_NASAL_RESTRICTION: 0
OS_INFERIOR_TEMPORAL_RESTRICTION: 0
OS_SUPERIOR_TEMPORAL_RESTRICTION: 0
OD_SUPERIOR_NASAL_RESTRICTION: 0
OS_NORMAL: 1
OD_INFERIOR_TEMPORAL_RESTRICTION: 0
OD_SUPERIOR_TEMPORAL_RESTRICTION: 0
OD_INFERIOR_NASAL_RESTRICTION: 0
OS_INFERIOR_NASAL_RESTRICTION: 0
OD_NORMAL: 1

## 2024-10-22 ASSESSMENT — REFRACTION_MANIFEST
OS_CYLINDER: +0.25
OS_SPHERE: -0.75
METHOD_AUTOREFRACTION: 1
OD_CYLINDER: SPHERE
OD_SPHERE: +0.50
OS_AXIS: 008

## 2024-10-22 ASSESSMENT — KERATOMETRY
OD_K2POWER_DIOPTERS: 43.25
OS_AXISANGLE_DEGREES: 041
OD_AXISANGLE2_DEGREES: 054
OS_AXISANGLE2_DEGREES: 131
OD_K1POWER_DIOPTERS: 43.00
OS_K2POWER_DIOPTERS: 43.00
OS_K1POWER_DIOPTERS: 42.75
OD_AXISANGLE_DEGREES: 144

## 2024-10-22 ASSESSMENT — EXTERNAL EXAM - RIGHT EYE: OD_EXAM: NORMAL

## 2024-10-22 ASSESSMENT — EXTERNAL EXAM - LEFT EYE: OS_EXAM: NORMAL

## 2024-10-22 NOTE — LETTER
10/22/2024      Donna Craven  [address]  [place] [address]      Dear Colleague,    Thank you for referring your patient, Donna Craven, to the [facility]. Please see a copy of my visit note below.    Chief Complaint   Patient presents with     Annual Eye Exam     Check for cataracts      Her  daughter was just diagnosed with bilateral anterior polar cataracts and was told that she likely has a cataract as well in one eye. No vision symptoms but hasn't gone to an optometrist as an adult.      Last Eye Exam: patient unsure   Dilated Previously: No, side effects of dilation explained today    What are you currently using to see?  does not use glasses or contacts    Distance Vision Acuity: Satisfied with vision    Near Vision Acuity: Satisfied with vision while reading  unaided    Eye Comfort: good  Do you use eye drops? : No  Occupation or Hobbies: RN at neuro [employer]    Hawa Chin Optometric Assistant, A.B.O.C.      Medical, surgical and family histories reviewed and updated 10/22/2024.       OBJECTIVE: See Ophthalmology exam    ASSESSMENT:    ICD-10-CM    1. Examination of eyes and vision  Z01.00 EYE EXAM (SIMPLE-NONBILLABLE)      2. Hyperopia of right eye  H52.01 REFRACTION      3. Myopia of left eye  H52.12 REFRACTION      4. 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  Optional glasses as needed.    Monitor cataract with routine annual eye exams.    Return in 1 year for a complete eye exam or sooner if needed.    Graham Rudd, OD         Again, thank you for allowing me to participate in the care of your patient.        Sincerely,        Graham Rudd, OD

## 2024-10-22 NOTE — PROGRESS NOTES
Chief Complaint   Patient presents with    Annual Eye Exam     Check for cataracts      Her  daughter was just diagnosed with bilateral anterior polar cataracts and was told that she likely has a cataract as well in one eye. No vision symptoms but hasn't gone to an optometrist as an adult.      Last Eye Exam: patient unsure   Dilated Previously: No, side effects of dilation explained today    What are you currently using to see?  does not use glasses or contacts    Distance Vision Acuity: Satisfied with vision    Near Vision Acuity: Satisfied with vision while reading  unaided    Eye Comfort: good  Do you use eye drops? 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       OBJECTIVE: See Ophthalmology exam    ASSESSMENT:    ICD-10-CM    1. Examination of eyes and vision  Z01.00 EYE EXAM (SIMPLE-NONBILLABLE)      2. Hyperopia of right eye  H52.01 REFRACTION      3. Myopia of left eye  H52.12 REFRACTION      4. 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  Optional glasses as needed.    Monitor cataract with routine annual eye exams.    Return in 1 year for a complete eye exam or sooner if needed.    Graham Rudd, OD

## 2024-10-22 NOTE — PATIENT INSTRUCTIONS
Eyeglass prescription given.  Optional glasses as needed.    Monitor cataract with routine annual eye exams.    Return in 1 year for a complete eye exam or sooner if needed.    Graham Rudd, OD    The affects of the dilating drops last for 4- 6 hours.  You will be more sensitive to light and vision will be blurry up close.  Do not drive if you do not feel comfortable.  Mydriatic sunglasses were given if needed.      Optometry Providers       Clinic Locations                                 Telephone Number   Dr. Morena Collier Sherwood    [facility]  [place] [phone]     [facility] Hours:                [place] [facility] Hours:       [facility] Hours:   [address]   [address]      [address] [place] [address]   [place] [address]    [place] [address]  Phone: [phone]                    Phone: [phone]     Phone: [phone]                      Monday 8:00-6:00                          Monday 8:00-6:00                          Monday 8:00-6:00              Tuesday 8:00-6:00                          Tuesday 8:00-6:00                          Tuesday 8:00-6:00              Wednesday 8:00-6:00                  Wednesday 8:00-6:00                   Wednesday 8:00-6:00      Thursday 8:00-6:00                        Thursday 8:00-6:00                         Thursday 8:00-6:00            Friday 8:00-5:00                              Friday 8:00-5:00                              Friday 8:00-5:00    [facility] Hours:   [address] [place] [address]  [phone]    Monday 9:00-6:00  Tuesday 9:00-6:00  Wednesday 9:00-6:00  Thursday 9:00-6:00  Friday 9:00-5:00  As always, Thank you for trusting us with your health care needs!

## 2024-11-07 ENCOUNTER — THERAPY VISIT (OUTPATIENT)
Dept: PHYSICAL THERAPY | Facility: CLINIC | Age: 39
End: 2024-11-07
Payer: COMMERCIAL

## 2024-11-07 DIAGNOSIS — N39.8 VOIDING DYSFUNCTION: ICD-10-CM

## 2024-11-07 DIAGNOSIS — N81.89 PELVIC FLOOR WEAKNESS: Primary | ICD-10-CM

## 2024-11-07 DIAGNOSIS — M62.89 PELVIC FLOOR DYSFUNCTION: ICD-10-CM

## 2024-11-07 PROCEDURE — 97110 THERAPEUTIC EXERCISES: CPT | Mod: GP | Performed by: PHYSICAL THERAPIST

## 2024-11-07 PROCEDURE — 97530 THERAPEUTIC ACTIVITIES: CPT | Mod: GP | Performed by: PHYSICAL THERAPIST

## 2024-11-07 NOTE — PROGRESS NOTES
"    DISCHARGE  Reason for Discharge: pt progressing toward goals, continue HEP.    Equipment Issued:     Discharge Plan: Patient to continue home program.   11/07/24 0500   Appointment Info   Signing clinician's name / credentials Laura Ya PT   Total/Authorized Visits E&T 6   Visits Used 4   Medical Diagnosis Pelvic floor weakness   PT Tx Diagnosis PF weakness, voiding dysfunction, PF dysfunction   Other pertinent information 8 weeks post partum   Quick Adds Pelvic Consent   Progress Note/Certification   Onset of illness/injury or Date of Surgery 07/08/24  (MD order for PT)   Therapy Frequency 1x week every other week for   Predicted Duration 12 weeks   Progress Note Completed Date 09/09/24   PT Goal 1   Goal Identifier pelvic floor muscle strength, endurance, coordination   Goal Description Increase strength to MMT: 3+/5, 5-8\" hold, full relaxation  (baseline: MMT 2-/5, 2\" hold, partially delayed relaxation)   Rationale to maximize safety and independence with performance of ADLs and functional tasks;to maximize safety and independence with self cares  (to allow full bladder emptying)   Goal Progress goal progressing- increase strength MMT 2/5, 3\" hold, improved relaxation   Target Date 12/02/24   Subjective Report   Subjective Report Pt reports doing kegels 3x daily- sit and standing, other exercises 2-3x wk. Better emptying bladder and no constipation issues past month.   Objective Measures   Objective Measures Objective Measure 1   Objective Measure 1   Objective Measure PFM strength, endurance, relaxation   Details PFMC: MMT 2/5, 3\" hold, improved relaxation.   Treatment Interventions (PT)   Interventions Therapeutic Procedure/Exercise;Therapeutic Activity;Manual Therapy   Therapeutic Procedure/Exercise   Therapeutic Procedures: strength, endurance, ROM, flexibility minutes (08206) 20   PTRx Ther Proc 1 Pelvic Floor Muscle Strengthening Basic   PTRx Ther Proc 1 - Details rev-5 reps 5\" hold 5\" relax 5x daily. " "(do this lying sitting or standing)   PTRx Ther Proc 2 Roll Outs   PTRx Ther Proc 2 - Details HEP   PTRx Ther Proc 3 Bridging Pelvic Floor  ( advance to weight shift bridge as able)   PTRx Ther Proc 3 - Details rev-10 reps 5\" hold 5x wk   PTRx Ther Proc 4 Supine Abdominal Exercise #4 (Leg Extension) #6 x 10 reps   PTRx Ther Proc 4 - Details rev-1x 10 reps 5x wk (pull navel to spine)   Skilled Intervention verbal cueing above   Patient Response/Progress ami well- fair TrA activation in supine with core #4   Therapeutic Activity   Therapeutic Activities: dynamic activities to improve functional performance minutes (96163) 15   Therapeutic Activities Ther Act 5;Ther Act 6;Ther Act 7   Ther Act 5 PF/plie squat   Ther Act 5 - Details HEP   Ther Act 6 PF/squat   Ther Act 6 - Details HEP   PTRx Ther Act 1 Pelvic Floor Anatomy and Function   PTRx Ther Act 1 - Details HEP-Discuss urogenital anatomy and function of PFM's in regard to bladder and bowel function and continence. Instruct in PF exercise program to  improve muscle awareness strength and endurance.   PTRx Ther Act 2 Functions of Fiber   PTRx Ther Act 2 - Details HEP   PTRx Ther Act 3 Toileting Position   PTRx Ther Act 3 - Details HEP-use stool under feet when toileting to allow ease of passing a bowel movement   PTRx Ther Act 4 Diaphragmatic Breathing   PTRx Ther Act 4 - Details HEP-breath in thru the nose exhale out thru the mouthTry this toileting to allow ease of passing urine or BM, add PFMC on exhale   PTRx Ther Act 5 Pelvic Myofascial Release   PTRx Ther Act 5 - Details HEP- gentle PF stretching at home- 5' -every other day.(see video)   Patient Response/Progress ami well   Ther Act 7 PF/sidestep   Ther Act 7 - Details x10 each direction   Ther Act 1 PF/lunge   Ther Act 1 - Details x10 alternating legs   Skilled Intervention verbal cueing with lunge, sidestep   Education   Learner/Method Patient;Listening;Pictures/Video   Plan   Home program see PTRX "   Updates to plan of care PF/lunge, PF/sidestep, advance core/bridge   Plan for next session Discharge with HEP   Comments   Pelvic Health Informed Consent Statement Discussed with patient/guardian reason for referral regarding pelvic health needs and external/internal pelvic floor muscle examination.  Opportunity provided to ask questions and verbal consent for assessment and intervention was given.   Total Session Time   Timed Code Treatment Minutes 35   Total Treatment Time (sum of timed and untimed services) 35         Referring Provider:  Kiesha Anderson

## 2024-11-22 ENCOUNTER — MEDICAL CORRESPONDENCE (OUTPATIENT)
Dept: HEALTH INFORMATION MANAGEMENT | Facility: CLINIC | Age: 39
End: 2024-11-22
Payer: COMMERCIAL

## 2025-01-11 ENCOUNTER — HEALTH MAINTENANCE LETTER (OUTPATIENT)
Age: 40
End: 2025-01-11

## 2025-06-09 ENCOUNTER — OFFICE VISIT (OUTPATIENT)
Dept: DERMATOLOGY | Facility: CLINIC | Age: 40
End: 2025-06-09
Payer: COMMERCIAL

## 2025-06-09 DIAGNOSIS — L82.1 SEBORRHEIC KERATOSES: ICD-10-CM

## 2025-06-09 DIAGNOSIS — L81.4 SOLAR LENTIGO: ICD-10-CM

## 2025-06-09 DIAGNOSIS — Z12.83 SKIN CANCER SCREENING: Primary | ICD-10-CM

## 2025-06-09 DIAGNOSIS — D18.01 CHERRY ANGIOMA: ICD-10-CM

## 2025-06-09 DIAGNOSIS — D22.9 MULTIPLE BENIGN NEVI: ICD-10-CM

## 2025-06-09 PROCEDURE — 99213 OFFICE O/P EST LOW 20 MIN: CPT | Performed by: PHYSICIAN ASSISTANT

## 2025-06-09 PROCEDURE — 1126F AMNT PAIN NOTED NONE PRSNT: CPT | Performed by: PHYSICIAN ASSISTANT

## 2025-06-09 ASSESSMENT — PAIN SCALES - GENERAL: PAINLEVEL_OUTOF10: NO PAIN (0)

## 2025-06-09 NOTE — PATIENT INSTRUCTIONS

## 2025-06-09 NOTE — NURSING NOTE
Dermatology Rooming Note    Donna Craven's goals for this visit include:   Chief Complaint   Patient presents with    Derm Problem     FBSE- spot of concern that looks different since pregnancy, spot of concern on scalp     NIDA Pollack
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
